# Patient Record
Sex: MALE | Race: WHITE | Employment: FULL TIME | URBAN - METROPOLITAN AREA
[De-identification: names, ages, dates, MRNs, and addresses within clinical notes are randomized per-mention and may not be internally consistent; named-entity substitution may affect disease eponyms.]

---

## 2018-01-15 ENCOUNTER — MYC MEDICAL ADVICE (OUTPATIENT)
Dept: FAMILY MEDICINE | Facility: CLINIC | Age: 58
End: 2018-01-15

## 2018-01-15 DIAGNOSIS — E78.5 HYPERLIPIDEMIA LDL GOAL <100: ICD-10-CM

## 2018-01-15 RX ORDER — SIMVASTATIN 40 MG
40 TABLET ORAL AT BEDTIME
Qty: 30 TABLET | Refills: 0 | Status: SHIPPED | OUTPATIENT
Start: 2018-01-15 | End: 2018-02-24

## 2018-01-18 ENCOUNTER — OFFICE VISIT (OUTPATIENT)
Dept: FAMILY MEDICINE | Facility: CLINIC | Age: 58
End: 2018-01-18
Payer: COMMERCIAL

## 2018-01-18 VITALS
SYSTOLIC BLOOD PRESSURE: 125 MMHG | BODY MASS INDEX: 28.85 KG/M2 | HEIGHT: 72 IN | TEMPERATURE: 97.4 F | RESPIRATION RATE: 16 BRPM | HEART RATE: 67 BPM | WEIGHT: 213 LBS | DIASTOLIC BLOOD PRESSURE: 85 MMHG

## 2018-01-18 DIAGNOSIS — Z11.59 NEED FOR HEPATITIS C SCREENING TEST: ICD-10-CM

## 2018-01-18 DIAGNOSIS — N52.9 ERECTILE DYSFUNCTION, UNSPECIFIED ERECTILE DYSFUNCTION TYPE: ICD-10-CM

## 2018-01-18 DIAGNOSIS — E78.5 HYPERLIPIDEMIA LDL GOAL <100: Primary | ICD-10-CM

## 2018-01-18 PROCEDURE — 36415 COLL VENOUS BLD VENIPUNCTURE: CPT | Performed by: FAMILY MEDICINE

## 2018-01-18 PROCEDURE — 86803 HEPATITIS C AB TEST: CPT | Performed by: FAMILY MEDICINE

## 2018-01-18 PROCEDURE — 80061 LIPID PANEL: CPT | Performed by: FAMILY MEDICINE

## 2018-01-18 PROCEDURE — 99213 OFFICE O/P EST LOW 20 MIN: CPT | Performed by: FAMILY MEDICINE

## 2018-01-18 PROCEDURE — 83721 ASSAY OF BLOOD LIPOPROTEIN: CPT | Performed by: FAMILY MEDICINE

## 2018-01-18 RX ORDER — SILDENAFIL 100 MG/1
TABLET, FILM COATED ORAL
Qty: 10 TABLET | Refills: 5 | Status: SHIPPED | OUTPATIENT
Start: 2018-01-18 | End: 2019-03-06

## 2018-01-18 RX ORDER — SIMVASTATIN 40 MG
40 TABLET ORAL AT BEDTIME
Qty: 30 TABLET | Refills: 0 | Status: CANCELLED | OUTPATIENT
Start: 2018-01-18

## 2018-01-18 NOTE — MR AVS SNAPSHOT
After Visit Summary   1/18/2018    Alek Gimenez    MRN: [id number]           Patient Information     Date Of Birth          1960        Visit Information        Provider Department      1/18/2018 4:00 PM Дмитрий Mendez MD [facility]        Today's Diagnoses     Need for hepatitis C screening test    -  1    Hyperlipidemia LDL goal <100        Erectile dysfunction, unspecified erectile dysfunction type          Care Instructions    Upon confirming cholesterol is in a good range we will continue simvastatin 40mg daily.          Follow-ups after your visit        Who to contact     If you have questions or need follow up information about today's clinic visit or your schedule please contact [facility] directly at [phone].  Normal or non-critical lab and imaging results will be communicated to you by MyChart, letter or phone within 4 business days after the clinic has received the results. If you do not hear from us within 7 days, please contact the clinic through Unypehart or phone. If you have a critical or abnormal lab result, we will notify you by phone as soon as possible.  Submit refill requests through Edaixi or call your pharmacy and they will forward the refill request to us. Please allow 3 business days for your refill to be completed.          Additional Information About Your Visit        MyChart Information     Edaixi gives you secure access to your electronic health record. If you see a primary care provider, you can also send messages to your care team and make appointments. If you have questions, please call your primary care clinic.  If you do not have a primary care provider, please call [phone] and they will assist you.        Care EveryWhere ID     This is your Care EveryWhere ID. This could be used by other organizations to access your [place] medical records  [id number]        Your Vitals Were     Pulse  "Temperature Respirations Height BMI (Body Mass Index)       67 97.4  F (36.3  C) (Oral) 16 5' 11.5\" (1.816 m) 29.29 kg/m2        Blood Pressure from Last 3 Encounters:   01/18/18 125/85   12/13/16 117/81   03/22/16 124/89    Weight from Last 3 Encounters:   01/18/18 213 lb (96.6 kg)   12/13/16 204 lb 12.8 oz (92.9 kg)   03/22/16 215 lb (97.5 kg)              We Performed the Following     Hepatitis C antibody     Lipid Profile with reflex to direct LDL          Where to get your medicines      These medications were sent to [id number] - [place], [place] - [address]  [address] [place] [address]     Phone:  [phone]     sildenafil 100 MG tablet          Primary Care Provider Office Phone # Fax #    Дмитрий Jay Mendez [phone] [address]  [facility] [address]        Equal Access to Services     JOSE WEBB AH: Hadii aad ku hadasho Soomaali, waaxda luqadaha, qaybta kaalmada adeshelbiyashobha, delilah askew . So [facility] [phone].    ATENCIÓN: Si habla español, tiene a guajardo disposición servicios gratuitos de asistencia lingüística. [facility] [phone].    We comply with applicable federal civil rights laws and [place] laws. We do not discriminate on the basis of race, color, national origin, age, disability, sex, sexual orientation, or gender identity.            Thank you!     Thank you for choosing [facility]  for your care. Our goal is always to provide you with excellent care. Hearing back from our patients is one way we can continue to improve our services. Please take a few minutes to complete the written survey that you may receive in the mail after your visit with us. Thank you!             Your Updated Medication List - Protect others around you: Learn how to safely use, store and throw away your medicines at www.disposemymeds.org.          This list is accurate as of: " 1/18/18  4:08 PM.  Always use your most recent med list.                   Brand Name Dispense Instructions for use Diagnosis    aspirin 81 MG tablet     30 tablet    Take 1 tablet (81 mg) by mouth daily        sildenafil 100 MG tablet    VIAGRA    10 tablet    0.5-1 tablets (50mg-100 mg) by mouth daily prn for erectile dysfunction prior to intercourse. Avoid nitroglycerin, terazosin or doxazosin.    Erectile dysfunction, unspecified erectile dysfunction type       simvastatin 40 MG tablet    ZOCOR    30 tablet    Take 1 tablet (40 mg) by mouth At Bedtime    Hyperlipidemia LDL goal <100

## 2018-01-18 NOTE — PROGRESS NOTES
"  SUBJECTIVE:                                                    Alek Gimenez is a 57 year old male who presents to clinic today for the following health issues:      History of Present Illness     Hyperlipidemia:     Low fat/chol diet rating::  Not monitoring fat    Taking Statins::  YES    Lipid Medications or Supplements::  None    Diet:  Regular (no restrictions)  Frequency of exercise:  None  Taking medications regularly:  Yes  Medication side effects:  None  Additional concerns today:  No    He did have a PFT test at work and it was normal. If it is really cold he may cough once or twice, but doing OK.    Physical Activity: Doesn't do any regular exercise, but does exercise when on vacation.  Nutrition: He has been preparing foods at home.    EXAM:  /85  Pulse 67  Temp 97.4  F (36.3  C) (Oral)  Resp 16  Ht 5' 11.5\" (1.816 m)  Wt 213 lb (96.6 kg)  BMI 29.29 kg/m2  Constitutional: Healthy, alert, no distress   EENT: PERRL, TM's clear bilaterally, oropharynx without erythema, no anterior cervical lymphadenopathy   Cardiovascular: RRR. No murmurs   Respiratory: Clear to auscultation   Skin: some dryness, no moles of concern    The 10-year ASCVD risk score (Geovanni DC Jr, et al., 2013) is: 9.8%    Values used to calculate the score:      Age: 57 years      Sex: Male      Is Non- : No      Diabetic: No      Tobacco smoker: No      Systolic Blood Pressure: 125 mmHg      Is BP treated: No      HDL Cholesterol: 44 mg/dL      Total Cholesterol: 273 mg/dL     ASSESSMENT    ICD-10-CM    1. Hyperlipidemia LDL goal <100 E78.5 Lipid Profile with reflex to direct LDL   2. Need for hepatitis C screening test Z11.59 Hepatitis C antibody   3. Erectile dysfunction, unspecified erectile dysfunction type N52.9 sildenafil (VIAGRA) 100 MG tablet      Plan:  Patient Instructions   Upon confirming cholesterol is in a good range we will continue simvastatin 40mg daily.     Дмитрий Mendez, " MD  Family Medicine Physician

## 2018-01-19 LAB
CHOLEST SERPL-MCNC: 210 MG/DL
HCV AB SERPL QL IA: NONREACTIVE
HDLC SERPL-MCNC: 39 MG/DL
LDLC SERPL CALC-MCNC: ABNORMAL MG/DL
LDLC SERPL DIRECT ASSAY-MCNC: 116 MG/DL
NONHDLC SERPL-MCNC: 171 MG/DL
TRIGL SERPL-MCNC: 544 MG/DL

## 2018-02-24 DIAGNOSIS — E78.5 HYPERLIPIDEMIA LDL GOAL <100: ICD-10-CM

## 2018-02-24 NOTE — LETTER
"March 6, 2018      Alek Gimenez  [address] [place] [address]        Dear Alek,     We tried to reach you a number of times by phone but there is no way to leave a message thus we are sending this letter. Can you read the providers notes below as he is wanting to discuss some things with you about the test results.  I enclose a copy.  Teresa Mendez writes:    \"Refilled simvastatin, but please inquire with patient his thoughts on triglycerides. One possiblity is that he was not fasting at the time of the most recent cholesterol test. We could retest in that case.     Alternatively could consider Niacin.     Here are thoughts I typically review with patients regarding niacin. This drug often has side effects that require the dose to be slowly built up to effective levels; many persons cannot continue the drug due to side effects, but it is very effective if the side effects are tolerable. Flushing, itching and feeling hot will likely occur after dosing, especially with higher doses. This can be reduced or reduced by taking an aspirin 30 minutes before the niacin. He may use either the immediate release 50 mg tabs and up-titrate, or use the timed-release larger doses, both available OTC. The effective dose is between 1000 and 2000 mg per day. We should repeat lipids along with LFT's in 3 months.\"      Дмитрий Mendez MD  Family Medicine Physician           Sincerely,        Дмитрий Mendez MD          "

## 2018-02-25 NOTE — TELEPHONE ENCOUNTER
"Requested Prescriptions   Pending Prescriptions Disp Refills     simvastatin (ZOCOR) 40 MG tablet [Pharmacy Med Name: SIMVASTATIN 40MG TABLETS]    Last Written Prescription Date:  1/15/2018  Last Fill Quantity: 30 tablets ,  # refills: 0   Last Office Visit: 1/18/2018   Future Office Visit:      30 tablet 0     Sig: TAKE 1 TABLET(40 MG) BY MOUTH AT BEDTIME    Statins Protocol Passed    2/24/2018  9:31 AM       Passed - LDL on file in past 12 months    Recent Labs   Lab Test  01/18/18   1522   LDL  Cannot estimate LDL when triglyceride exceeds 400 mg/dL  116*          Passed - No abnormal creatine kinase in past 12 months    No lab results found.         Passed - Recent or future visit with authorizing provider    Patient had office visit in the last year or has a visit in the next 30 days with authorizing provider.  See \"Patient Info\" tab in inbasket, or \"Choose Columns\" in Meds & Orders section of the refill encounter.          Passed - Patient is age 18 or older          "

## 2018-02-26 RX ORDER — SIMVASTATIN 40 MG
TABLET ORAL
Qty: 90 TABLET | Refills: 3 | Status: SHIPPED | OUTPATIENT
Start: 2018-02-26 | End: 2019-03-06

## 2018-02-26 NOTE — TELEPHONE ENCOUNTER
Refilled simvastatin, but please inquire with patient his thoughts on triglycerides. One possiblity is that he was not fasting at the time of the most recent cholesterol test. We could retest in that case.    Alternatively could consider Niacin.    Here are thoughts I typically review with patients regarding niacin. This drug often has side effects that require the dose to be slowly built up to effective levels; many persons cannot continue the drug due to side effects, but it is very effective if the side effects are tolerable. Flushing, itching and feeling hot will likely occur after dosing, especially with higher doses. This can be reduced or reduced by taking an aspirin 30 minutes before the niacin. He may use either the immediate release 50 mg tabs and up-titrate, or use the timed-release larger doses, both available OTC. The effective dose is between 1000 and 2000 mg per day. We should repeat lipids along with LFT's in 3 months.     Дмитрий Mendez MD  Family Medicine Physician

## 2018-02-26 NOTE — TELEPHONE ENCOUNTER
LDL direct of 116  Routing to provider.  Problem list is empty - please consider completing  Truptise Pollard, BRENDA

## 2018-03-06 ENCOUNTER — HOSPITAL ENCOUNTER (OUTPATIENT)
Dept: LAB | Facility: CLINIC | Age: 58
Discharge: HOME | End: 2018-03-06
Attending: FAMILY MEDICINE | Admitting: FAMILY MEDICINE
Payer: COMMERCIAL

## 2018-03-06 ENCOUNTER — TELEPHONE (OUTPATIENT)
Dept: FAMILY MEDICINE | Facility: CLINIC | Age: 58
End: 2018-03-06

## 2018-03-06 DIAGNOSIS — Z13.6 ENCOUNTER FOR LIPID SCREENING FOR CARDIOVASCULAR DISEASE: Primary | ICD-10-CM

## 2018-03-06 DIAGNOSIS — Z13.6 ENCOUNTER FOR LIPID SCREENING FOR CARDIOVASCULAR DISEASE: ICD-10-CM

## 2018-03-06 DIAGNOSIS — Z13.220 ENCOUNTER FOR LIPID SCREENING FOR CARDIOVASCULAR DISEASE: Primary | ICD-10-CM

## 2018-03-06 DIAGNOSIS — Z13.220 ENCOUNTER FOR LIPID SCREENING FOR CARDIOVASCULAR DISEASE: ICD-10-CM

## 2018-03-06 LAB
CHOLEST SERPL-MCNC: 231 MG/DL
HDLC SERPL-MCNC: 39 MG/DL
LDLC SERPL CALC-MCNC: 141 MG/DL
NONHDLC SERPL-MCNC: 192 MG/DL
TRIGL SERPL-MCNC: 256 MG/DL

## 2018-03-06 PROCEDURE — 80061 LIPID PANEL: CPT | Performed by: FAMILY MEDICINE

## 2018-03-06 PROCEDURE — 36415 COLL VENOUS BLD VENIPUNCTURE: CPT | Performed by: FAMILY MEDICINE

## 2018-03-06 NOTE — TELEPHONE ENCOUNTER
Letter sent to patient with his lab results hoping he gets back to us in response to recommendations and questions by provider.  Teresa Bhatti RN

## 2018-03-06 NOTE — TELEPHONE ENCOUNTER
"RD lab calling for fasting lab orders. Patient in lab currently. Last lab on 1.18.2018 stated per Дмитрий Mendez MD:    \"The cholesterol tests all were in a good range with the exception of the triglycerides which we not only elevated, but substantially elevated compared to the test a year ago. This is perplexing. Is it possible this relates to an incomplete fast? One approach would be to retest this level in a month or so. Please let me your thoughts. I can place a future order for fasting lipid if you'd like to retest this. I do recommend you continue the simvastatin 40mg nightly at this point. \"    Writer placed order for FLP as indicated in noted from provider.     Thanks! Shaylee Mathias RN    "

## 2019-03-06 ENCOUNTER — OFFICE VISIT (OUTPATIENT)
Dept: FAMILY MEDICINE | Facility: CLINIC | Age: 59
End: 2019-03-06
Payer: COMMERCIAL

## 2019-03-06 VITALS
DIASTOLIC BLOOD PRESSURE: 68 MMHG | RESPIRATION RATE: 14 BRPM | BODY MASS INDEX: 28.61 KG/M2 | OXYGEN SATURATION: 97 % | HEART RATE: 70 BPM | TEMPERATURE: 97.8 F | WEIGHT: 208 LBS | SYSTOLIC BLOOD PRESSURE: 110 MMHG

## 2019-03-06 DIAGNOSIS — E78.5 HYPERLIPIDEMIA LDL GOAL <100: ICD-10-CM

## 2019-03-06 DIAGNOSIS — N52.9 ERECTILE DYSFUNCTION, UNSPECIFIED ERECTILE DYSFUNCTION TYPE: Primary | ICD-10-CM

## 2019-03-06 DIAGNOSIS — Z23 NEED FOR SHINGLES VACCINE: ICD-10-CM

## 2019-03-06 LAB
ALBUMIN SERPL-MCNC: 4.1 G/DL (ref 3.4–5)
ALP SERPL-CCNC: 104 U/L (ref 40–150)
ALT SERPL W P-5'-P-CCNC: 41 U/L (ref 0–70)
ANION GAP SERPL CALCULATED.3IONS-SCNC: 6 MMOL/L (ref 3–14)
AST SERPL W P-5'-P-CCNC: 18 U/L (ref 0–45)
BILIRUB SERPL-MCNC: 0.5 MG/DL (ref 0.2–1.3)
BUN SERPL-MCNC: 20 MG/DL (ref 7–30)
CALCIUM SERPL-MCNC: 9.5 MG/DL (ref 8.5–10.1)
CHLORIDE SERPL-SCNC: 106 MMOL/L (ref 94–109)
CO2 SERPL-SCNC: 28 MMOL/L (ref 20–32)
CREAT SERPL-MCNC: 1.02 MG/DL (ref 0.66–1.25)
GFR SERPL CREATININE-BSD FRML MDRD: 80 ML/MIN/{1.73_M2}
GLUCOSE SERPL-MCNC: 94 MG/DL (ref 70–99)
POTASSIUM SERPL-SCNC: 4.5 MMOL/L (ref 3.4–5.3)
PROT SERPL-MCNC: 7.9 G/DL (ref 6.8–8.8)
SODIUM SERPL-SCNC: 140 MMOL/L (ref 133–144)

## 2019-03-06 PROCEDURE — 90750 HZV VACC RECOMBINANT IM: CPT | Performed by: FAMILY MEDICINE

## 2019-03-06 PROCEDURE — 36415 COLL VENOUS BLD VENIPUNCTURE: CPT | Performed by: FAMILY MEDICINE

## 2019-03-06 PROCEDURE — 90471 IMMUNIZATION ADMIN: CPT | Performed by: FAMILY MEDICINE

## 2019-03-06 PROCEDURE — 99213 OFFICE O/P EST LOW 20 MIN: CPT | Mod: 25 | Performed by: FAMILY MEDICINE

## 2019-03-06 PROCEDURE — 80053 COMPREHEN METABOLIC PANEL: CPT | Performed by: FAMILY MEDICINE

## 2019-03-06 RX ORDER — SILDENAFIL 100 MG/1
TABLET, FILM COATED ORAL
Qty: 10 TABLET | Refills: 5 | Status: SHIPPED | OUTPATIENT
Start: 2019-03-06 | End: 2020-06-08

## 2019-03-06 RX ORDER — SIMVASTATIN 40 MG
40 TABLET ORAL AT BEDTIME
Qty: 90 TABLET | Refills: 3 | Status: SHIPPED | OUTPATIENT
Start: 2019-03-06 | End: 2020-05-20

## 2019-03-06 NOTE — PATIENT INSTRUCTIONS
Return in 2-6 months for Shingles vaccine #2 (between 3/6/19 and 9/2/19)    Continue current dose of simvastatin    Continue sildenafil

## 2019-03-06 NOTE — PROGRESS NOTES
SUBJECTIVE:   Alek Gimenez is a 58 year old male who presents to clinic today for the following health issues:      Hyperlipidemia Follow-Up      Rate your low fat/cholesterol diet?: good    Taking statin?  Yes, no muscle aches from statin    Other lipid medications/supplements?:  none      Amount of exercise or physical activity: None    Problems taking medications regularly: No    Medication side effects: none    Diet: regular (no restrictions)    The 10-year ASCVD risk score (Geovanni MCPHERSON Jr., et al., 2013) is: 7.9%    Values used to calculate the score:      Age: 58 years      Sex: Male      Is Non- : No      Diabetic: No      Tobacco smoker: No      Systolic Blood Pressure: 110 mmHg      Is BP treated: No      HDL Cholesterol: 39 mg/dL      Total Cholesterol: 231 mg/dL    He has been on statin for 4 years.    EXAM:  /68   Pulse 70   Temp 97.8  F (36.6  C)   Resp 14   Wt 94.3 kg (208 lb)   SpO2 97%   BMI 28.61 kg/m    Constitutional: Healthy, alert, no distress   EENT: PERRL, TM's clear bilaterally, oropharynx without erythema, no anterior cervical lymphadenopathy   Cardiovascular: RRR. No murmurs   Respiratory: Clear to auscultation     ASSESSMENT    ICD-10-CM    1. Erectile dysfunction, unspecified erectile dysfunction type N52.9 sildenafil (VIAGRA) 100 MG tablet   2. Need for shingles vaccine Z23 HC ZOSTER VACCINE RECOMBINANT ADJUVANTED IM NJX   3. Hyperlipidemia LDL goal <100 E78.5 simvastatin (ZOCOR) 40 MG tablet     Comprehensive metabolic panel (BMP + Alb, Alk Phos, ALT, AST, Total. Bili, TP)      Plan:  Patient Instructions   Return in 2-6 months for Shingles vaccine #2 (between 3/6/19 and 9/2/19)    Continue current dose of simvastatin    Continue sildenafil       Return in about 1 year (around 3/6/2020) for Routine Visit.    Дмитрий Mendez MD  Family Medicine Physician

## 2019-03-06 NOTE — NURSING NOTE
Prior to injection verified patient identity using patient's name and date of birth.  Due to injection administration, patient instructed to remain in clinic for 15 minutes  afterwards, and to report any adverse reaction to me immediately. Vaccine information supplied.    Screening Questionnaire for Adult Immunization    Are you sick today?   No   Do you have allergies to medications, food, a vaccine component or latex?   No   Have you ever had a serious reaction after receiving a vaccination?   No   Do you have a long-term health problem with heart disease, lung disease, asthma, kidney disease, metabolic disease (e.g. diabetes), anemia, or other blood disorder?   No   Do you have cancer, leukemia, HIV/AIDS, or any other immune system problem?   No   In the past 3 months, have you taken medications that affect  your immune system, such as prednisone, other steroids, or anticancer drugs; drugs for the treatment of rheumatoid arthritis, Crohn s disease, or psoriasis; or have you had radiation treatments?   No   Have you had a seizure, or a brain or other nervous system problem?   No   During the past year, have you received a transfusion of blood or blood     products, or been given immune (gamma) globulin or antiviral drug?   No   For women: Are you pregnant or is there a chance you could become        pregnant during the next month?   No   Have you received any vaccinations in the past 4 weeks?   No     Immunization questionnaire answers were all negative.        Per orders of Dr. London Mendez, injection of Shigrix given by Pedro Gill. Patient instructed to remain in clinic for 15 minutes afterwards, and to report any adverse reaction to me immediately.       Screening performed by Pedro Gill on 3/6/2019 at 10:55 AM.

## 2019-10-04 ENCOUNTER — HEALTH MAINTENANCE LETTER (OUTPATIENT)
Age: 59
End: 2019-10-04

## 2019-12-16 ENCOUNTER — OFFICE VISIT (OUTPATIENT)
Dept: FAMILY MEDICINE | Facility: CLINIC | Age: 59
End: 2019-12-16
Payer: COMMERCIAL

## 2019-12-16 VITALS
SYSTOLIC BLOOD PRESSURE: 108 MMHG | TEMPERATURE: 98 F | DIASTOLIC BLOOD PRESSURE: 76 MMHG | WEIGHT: 215 LBS | HEART RATE: 80 BPM | OXYGEN SATURATION: 95 % | BODY MASS INDEX: 30.1 KG/M2 | HEIGHT: 71 IN

## 2019-12-16 DIAGNOSIS — E78.5 HYPERLIPIDEMIA, UNSPECIFIED HYPERLIPIDEMIA TYPE: Primary | ICD-10-CM

## 2019-12-16 DIAGNOSIS — Z12.5 SCREENING FOR PROSTATE CANCER: ICD-10-CM

## 2019-12-16 DIAGNOSIS — Z23 NEED FOR VACCINATION: ICD-10-CM

## 2019-12-16 PROCEDURE — 80061 LIPID PANEL: CPT | Performed by: INTERNAL MEDICINE

## 2019-12-16 PROCEDURE — 90750 HZV VACC RECOMBINANT IM: CPT | Performed by: INTERNAL MEDICINE

## 2019-12-16 PROCEDURE — 99203 OFFICE O/P NEW LOW 30 MIN: CPT | Mod: 25 | Performed by: INTERNAL MEDICINE

## 2019-12-16 PROCEDURE — 90471 IMMUNIZATION ADMIN: CPT | Performed by: INTERNAL MEDICINE

## 2019-12-16 PROCEDURE — 36415 COLL VENOUS BLD VENIPUNCTURE: CPT | Performed by: INTERNAL MEDICINE

## 2019-12-16 PROCEDURE — G0103 PSA SCREENING: HCPCS | Performed by: INTERNAL MEDICINE

## 2019-12-16 ASSESSMENT — ENCOUNTER SYMPTOMS
MYALGIAS: 0
FATIGUE: 0
BLOOD IN STOOL: 0
HEADACHES: 0
DIFFICULTY URINATING: 0
TROUBLE SWALLOWING: 0
CONSTIPATION: 0
ABDOMINAL PAIN: 0
DIARRHEA: 0
LIGHT-HEADEDNESS: 0
BACK PAIN: 0
CHILLS: 0
SHORTNESS OF BREATH: 0
SORE THROAT: 0
ARTHRALGIAS: 0
FEVER: 0
NERVOUS/ANXIOUS: 0
PALPITATIONS: 0
DIZZINESS: 0
EYE PAIN: 0
COUGH: 0
SLEEP DISTURBANCE: 0
NAUSEA: 0
VOMITING: 0
NUMBNESS: 0
NECK PAIN: 0

## 2019-12-16 ASSESSMENT — MIFFLIN-ST. JEOR: SCORE: 1812.36

## 2019-12-16 NOTE — PROGRESS NOTES
"Subjective     Alek Gimenez is a 59 year old male who presents to clinic today for the following health issues: recheck medication    HPI     Patient takes simvastatin 40 mg daily for hyperlipidemia and has been tolerating the medication well.      Past Medical History:   Diagnosis Date     Hyperlipidemia, unspecified hyperlipidemia type 12/16/2019       Review of Systems   Constitutional: Negative for chills, fatigue and fever.   HENT: Negative for congestion, ear pain, hearing loss, sore throat and trouble swallowing.    Eyes: Negative for pain and visual disturbance.   Respiratory: Negative for cough and shortness of breath.    Cardiovascular: Negative for chest pain and palpitations.   Gastrointestinal: Negative for abdominal pain, blood in stool, constipation, diarrhea, nausea and vomiting.   Genitourinary: Negative for difficulty urinating and testicular pain.   Musculoskeletal: Negative for arthralgias, back pain, myalgias and neck pain.   Skin: Negative for rash.   Neurological: Negative for dizziness, light-headedness, numbness and headaches.   Psychiatric/Behavioral: Negative for sleep disturbance. The patient is not nervous/anxious.        /76 (BP Location: Right arm, Cuff Size: Adult Large)   Pulse 80   Temp 98  F (36.7  C) (Oral)   Ht 1.803 m (5' 11\")   Wt 97.5 kg (215 lb)   SpO2 95%   BMI 29.99 kg/m        Physical Exam  Vitals signs and nursing note reviewed.   Constitutional:       General: He is not in acute distress.  Neck:      Thyroid: No thyromegaly.   Cardiovascular:      Rate and Rhythm: Normal rate and regular rhythm.      Heart sounds: Normal heart sounds.   Pulmonary:      Effort: Pulmonary effort is normal. No respiratory distress.      Breath sounds: Normal breath sounds.   Neurological:      Mental Status: He is alert and oriented to person, place, and time.      Coordination: Coordination normal.           ICD-10-CM    1. Hyperlipidemia, unspecified hyperlipidemia type " E78.5 Lipid panel reflex to direct LDL Fasting   2. Screening for prostate cancer Z12.5 Prostate spec antigen screen   3. Need for vaccination Z23 SHINGRIX [[id number]]       Patient Instructions   Maintain low fat/calorie diet and regular exercise.    Follow up yearly or as needed.

## 2019-12-16 NOTE — NURSING NOTE
Prior to immunization administration, verified patients identity using patient s name and date of birth. Please see Immunization Activity for additional information.     Screening Questionnaire for Adult Immunization    Are you sick today?   No   Do you have allergies to medications, food, a vaccine component or latex?   No   Have you ever had a serious reaction after receiving a vaccination?   No   Do you have a long-term health problem with heart, lung, kidney, or metabolic disease (e.g., diabetes), asthma, a blood disorder, no spleen, complement component deficiency, a cochlear implant, or a spinal fluid leak?  Are you on long-term aspirin therapy?   No   Do you have cancer, leukemia, HIV/AIDS, or any other immune system problem?   No   Do you have a parent, brother, or sister with an immune system problem?   No   In the past 3 months, have you taken medications that affect  your immune system, such as prednisone, other steroids, or anticancer drugs; drugs for the treatment of rheumatoid arthritis, Crohn s disease, or psoriasis; or have you had radiation treatments?   No   Have you had a seizure, or a brain or other nervous system problem?   No   During the past year, have you received a transfusion of blood or blood    products, or been given immune (gamma) globulin or antiviral drug?   No   For women: Are you pregnant or is there a chance you could become       pregnant during the next month?   No   Have you received any vaccinations in the past 4 weeks?   No     Immunization questionnaire answers were all negative.        Per orders of Dr. Agosto, injection of Shingrix #2 given by Stephanie Gary MA. Patient instructed to remain in clinic for 15 minutes afterwards, and to report any adverse reaction to me immediately.  Patient verified     Screening performed by Stephanie Gary MA on 12/16/2019 at 1:14 PM.

## 2019-12-17 LAB
CHOLEST SERPL-MCNC: 202 MG/DL
HDLC SERPL-MCNC: 40 MG/DL
LDLC SERPL CALC-MCNC: 118 MG/DL
NONHDLC SERPL-MCNC: 162 MG/DL
PSA SERPL-ACNC: 0.94 UG/L (ref 0–4)
TRIGL SERPL-MCNC: 219 MG/DL

## 2019-12-28 ENCOUNTER — HOSPITAL ENCOUNTER (INPATIENT)
Facility: CLINIC | Age: 59
LOS: 1 days | Discharge: HOME | DRG: 378 | End: 2019-12-30
Attending: EMERGENCY MEDICINE | Admitting: INTERNAL MEDICINE
Payer: COMMERCIAL

## 2019-12-28 DIAGNOSIS — K92.2 GASTROINTESTINAL HEMORRHAGE, UNSPECIFIED GASTROINTESTINAL HEMORRHAGE TYPE: ICD-10-CM

## 2019-12-28 DIAGNOSIS — K92.2 UPPER GI BLEEDING: Primary | ICD-10-CM

## 2019-12-28 DIAGNOSIS — K92.1 MELENA: ICD-10-CM

## 2019-12-28 DIAGNOSIS — D62 ANEMIA DUE TO BLOOD LOSS, ACUTE: ICD-10-CM

## 2019-12-28 LAB
ALBUMIN SERPL-MCNC: 3.2 G/DL (ref 3.4–5)
ALP SERPL-CCNC: 88 U/L (ref 40–150)
ALT SERPL W P-5'-P-CCNC: 30 U/L (ref 0–70)
ANION GAP SERPL CALCULATED.3IONS-SCNC: 7 MMOL/L (ref 3–14)
AST SERPL W P-5'-P-CCNC: 14 U/L (ref 0–45)
BASOPHILS # BLD AUTO: 0 10E9/L (ref 0–0.2)
BASOPHILS NFR BLD AUTO: 0.2 %
BILIRUB SERPL-MCNC: 0.4 MG/DL (ref 0.2–1.3)
BUN SERPL-MCNC: 50 MG/DL (ref 7–30)
CALCIUM SERPL-MCNC: 8.2 MG/DL (ref 8.5–10.1)
CHLORIDE SERPL-SCNC: 107 MMOL/L (ref 94–109)
CO2 SERPL-SCNC: 25 MMOL/L (ref 20–32)
CREAT SERPL-MCNC: 0.88 MG/DL (ref 0.66–1.25)
DIFFERENTIAL METHOD BLD: ABNORMAL
EOSINOPHIL # BLD AUTO: 0.4 10E9/L (ref 0–0.7)
EOSINOPHIL NFR BLD AUTO: 3.1 %
ERYTHROCYTE [DISTWIDTH] IN BLOOD BY AUTOMATED COUNT: 12.6 % (ref 10–15)
GFR SERPL CREATININE-BSD FRML MDRD: >90 ML/MIN/{1.73_M2}
GLUCOSE SERPL-MCNC: 172 MG/DL (ref 70–99)
HCT VFR BLD AUTO: 33.7 % (ref 40–53)
HGB BLD-MCNC: 11.5 G/DL (ref 13.3–17.7)
IMM GRANULOCYTES # BLD: 0.1 10E9/L (ref 0–0.4)
IMM GRANULOCYTES NFR BLD: 0.6 %
LYMPHOCYTES # BLD AUTO: 3.5 10E9/L (ref 0.8–5.3)
LYMPHOCYTES NFR BLD AUTO: 27 %
MCH RBC QN AUTO: 30.3 PG (ref 26.5–33)
MCHC RBC AUTO-ENTMCNC: 34.1 G/DL (ref 31.5–36.5)
MCV RBC AUTO: 89 FL (ref 78–100)
MONOCYTES # BLD AUTO: 0.7 10E9/L (ref 0–1.3)
MONOCYTES NFR BLD AUTO: 5.2 %
NEUTROPHILS # BLD AUTO: 8.3 10E9/L (ref 1.6–8.3)
NEUTROPHILS NFR BLD AUTO: 63.9 %
NRBC # BLD AUTO: 0 10*3/UL
NRBC BLD AUTO-RTO: 0 /100
PLATELET # BLD AUTO: 244 10E9/L (ref 150–450)
POTASSIUM SERPL-SCNC: 3.8 MMOL/L (ref 3.4–5.3)
PROT SERPL-MCNC: 6.3 G/DL (ref 6.8–8.8)
RBC # BLD AUTO: 3.8 10E12/L (ref 4.4–5.9)
SODIUM SERPL-SCNC: 139 MMOL/L (ref 133–144)
WBC # BLD AUTO: 13 10E9/L (ref 4–11)

## 2019-12-28 PROCEDURE — 25800030 ZZH RX IP 258 OP 636: Performed by: EMERGENCY MEDICINE

## 2019-12-28 PROCEDURE — 96376 TX/PRO/DX INJ SAME DRUG ADON: CPT

## 2019-12-28 PROCEDURE — 93005 ELECTROCARDIOGRAM TRACING: CPT

## 2019-12-28 PROCEDURE — C9113 INJ PANTOPRAZOLE SODIUM, VIA: HCPCS | Performed by: EMERGENCY MEDICINE

## 2019-12-28 PROCEDURE — 86900 BLOOD TYPING SEROLOGIC ABO: CPT | Performed by: EMERGENCY MEDICINE

## 2019-12-28 PROCEDURE — 85025 COMPLETE CBC W/AUTO DIFF WBC: CPT | Performed by: EMERGENCY MEDICINE

## 2019-12-28 PROCEDURE — 99285 EMERGENCY DEPT VISIT HI MDM: CPT | Mod: 25

## 2019-12-28 PROCEDURE — 86901 BLOOD TYPING SEROLOGIC RH(D): CPT | Performed by: EMERGENCY MEDICINE

## 2019-12-28 PROCEDURE — 25000128 H RX IP 250 OP 636: Performed by: EMERGENCY MEDICINE

## 2019-12-28 PROCEDURE — 86850 RBC ANTIBODY SCREEN: CPT | Performed by: EMERGENCY MEDICINE

## 2019-12-28 PROCEDURE — 80053 COMPREHEN METABOLIC PANEL: CPT | Performed by: EMERGENCY MEDICINE

## 2019-12-28 PROCEDURE — 96361 HYDRATE IV INFUSION ADD-ON: CPT

## 2019-12-28 RX ORDER — SODIUM CHLORIDE 9 MG/ML
1000 INJECTION, SOLUTION INTRAVENOUS CONTINUOUS
Status: DISCONTINUED | OUTPATIENT
Start: 2019-12-29 | End: 2019-12-29

## 2019-12-28 RX ADMIN — SODIUM CHLORIDE 1000 ML: 9 INJECTION, SOLUTION INTRAVENOUS at 23:34

## 2019-12-28 RX ADMIN — PANTOPRAZOLE SODIUM 40 MG: 40 INJECTION, POWDER, FOR SOLUTION INTRAVENOUS at 23:35

## 2019-12-28 ASSESSMENT — MIFFLIN-ST. JEOR: SCORE: 1805.55

## 2019-12-29 PROBLEM — K92.2 UPPER GI BLEEDING: Status: ACTIVE | Noted: 2019-12-29

## 2019-12-29 LAB
ABO + RH BLD: NORMAL
ABO + RH BLD: NORMAL
BLD GP AB SCN SERPL QL: NORMAL
BLOOD BANK CMNT PATIENT-IMP: NORMAL
HBA1C MFR BLD: 5.3 % (ref 0–5.6)
HGB BLD-MCNC: 9.4 G/DL (ref 13.3–17.7)
HGB BLD-MCNC: 9.5 G/DL (ref 13.3–17.7)
INTERPRETATION ECG - MUSE: NORMAL
LACTATE BLD-SCNC: 1.3 MMOL/L (ref 0.7–2)
SPECIMEN EXP DATE BLD: NORMAL
UPPER GI ENDOSCOPY: NORMAL

## 2019-12-29 PROCEDURE — 83605 ASSAY OF LACTIC ACID: CPT | Performed by: INTERNAL MEDICINE

## 2019-12-29 PROCEDURE — 83036 HEMOGLOBIN GLYCOSYLATED A1C: CPT | Performed by: INTERNAL MEDICINE

## 2019-12-29 PROCEDURE — 0DB98ZX EXCISION OF DUODENUM, VIA NATURAL OR ARTIFICIAL OPENING ENDOSCOPIC, DIAGNOSTIC: ICD-10-PCS | Performed by: INTERNAL MEDICINE

## 2019-12-29 PROCEDURE — C9113 INJ PANTOPRAZOLE SODIUM, VIA: HCPCS | Performed by: EMERGENCY MEDICINE

## 2019-12-29 PROCEDURE — 88305 TISSUE EXAM BY PATHOLOGIST: CPT | Mod: 26,76 | Performed by: INTERNAL MEDICINE

## 2019-12-29 PROCEDURE — 88305 TISSUE EXAM BY PATHOLOGIST: CPT | Performed by: INTERNAL MEDICINE

## 2019-12-29 PROCEDURE — 21000001 ZZH R&B HEART CARE

## 2019-12-29 PROCEDURE — 25000128 H RX IP 250 OP 636: Performed by: INTERNAL MEDICINE

## 2019-12-29 PROCEDURE — 25800030 ZZH RX IP 258 OP 636: Performed by: INTERNAL MEDICINE

## 2019-12-29 PROCEDURE — 43239 EGD BIOPSY SINGLE/MULTIPLE: CPT | Performed by: INTERNAL MEDICINE

## 2019-12-29 PROCEDURE — 43255 EGD CONTROL BLEEDING ANY: CPT | Performed by: INTERNAL MEDICINE

## 2019-12-29 PROCEDURE — 96365 THER/PROPH/DIAG IV INF INIT: CPT

## 2019-12-29 PROCEDURE — 88312 SPECIAL STAINS GROUP 1: CPT | Performed by: INTERNAL MEDICINE

## 2019-12-29 PROCEDURE — 25000125 ZZHC RX 250: Performed by: INTERNAL MEDICINE

## 2019-12-29 PROCEDURE — 85018 HEMOGLOBIN: CPT | Performed by: INTERNAL MEDICINE

## 2019-12-29 PROCEDURE — 25800030 ZZH RX IP 258 OP 636: Performed by: EMERGENCY MEDICINE

## 2019-12-29 PROCEDURE — 96366 THER/PROPH/DIAG IV INF ADDON: CPT

## 2019-12-29 PROCEDURE — 36415 COLL VENOUS BLD VENIPUNCTURE: CPT | Performed by: INTERNAL MEDICINE

## 2019-12-29 PROCEDURE — 99222 1ST HOSP IP/OBS MODERATE 55: CPT | Mod: AI | Performed by: INTERNAL MEDICINE

## 2019-12-29 PROCEDURE — C9113 INJ PANTOPRAZOLE SODIUM, VIA: HCPCS | Performed by: INTERNAL MEDICINE

## 2019-12-29 PROCEDURE — 99207 ZZC APP CREDIT; MD BILLING SHARED VISIT: CPT | Performed by: INTERNAL MEDICINE

## 2019-12-29 PROCEDURE — 43236 UPPR GI SCOPE W/SUBMUC INJ: CPT | Performed by: INTERNAL MEDICINE

## 2019-12-29 PROCEDURE — 3E0G8GC INTRODUCTION OF OTHER THERAPEUTIC SUBSTANCE INTO UPPER GI, VIA NATURAL OR ARTIFICIAL OPENING ENDOSCOPIC: ICD-10-PCS | Performed by: INTERNAL MEDICINE

## 2019-12-29 PROCEDURE — 88312 SPECIAL STAINS GROUP 1: CPT | Mod: 26 | Performed by: INTERNAL MEDICINE

## 2019-12-29 PROCEDURE — 25000128 H RX IP 250 OP 636: Performed by: EMERGENCY MEDICINE

## 2019-12-29 PROCEDURE — G0500 MOD SEDAT ENDO SERVICE >5YRS: HCPCS | Performed by: INTERNAL MEDICINE

## 2019-12-29 PROCEDURE — 0DB68ZX EXCISION OF STOMACH, VIA NATURAL OR ARTIFICIAL OPENING ENDOSCOPIC, DIAGNOSTIC: ICD-10-PCS | Performed by: INTERNAL MEDICINE

## 2019-12-29 RX ORDER — NALOXONE HYDROCHLORIDE 0.4 MG/ML
.1-.4 INJECTION, SOLUTION INTRAMUSCULAR; INTRAVENOUS; SUBCUTANEOUS
Status: CANCELLED | OUTPATIENT
Start: 2019-12-29 | End: 2019-12-30

## 2019-12-29 RX ORDER — ONDANSETRON 4 MG/1
4 TABLET, ORALLY DISINTEGRATING ORAL EVERY 6 HOURS PRN
Status: DISCONTINUED | OUTPATIENT
Start: 2019-12-29 | End: 2019-12-30 | Stop reason: HOSPADM

## 2019-12-29 RX ORDER — FLUMAZENIL 0.1 MG/ML
0.2 INJECTION, SOLUTION INTRAVENOUS
Status: CANCELLED | OUTPATIENT
Start: 2019-12-29 | End: 2019-12-29

## 2019-12-29 RX ORDER — NALOXONE HYDROCHLORIDE 0.4 MG/ML
.1-.4 INJECTION, SOLUTION INTRAMUSCULAR; INTRAVENOUS; SUBCUTANEOUS
Status: DISCONTINUED | OUTPATIENT
Start: 2019-12-29 | End: 2019-12-30 | Stop reason: HOSPADM

## 2019-12-29 RX ORDER — PROCHLORPERAZINE 25 MG
25 SUPPOSITORY, RECTAL RECTAL EVERY 12 HOURS PRN
Status: DISCONTINUED | OUTPATIENT
Start: 2019-12-29 | End: 2019-12-30 | Stop reason: HOSPADM

## 2019-12-29 RX ORDER — ACETAMINOPHEN 650 MG/1
650 SUPPOSITORY RECTAL EVERY 4 HOURS PRN
Status: DISCONTINUED | OUTPATIENT
Start: 2019-12-29 | End: 2019-12-30 | Stop reason: HOSPADM

## 2019-12-29 RX ORDER — ACETAMINOPHEN 325 MG/1
650 TABLET ORAL EVERY 4 HOURS PRN
Status: DISCONTINUED | OUTPATIENT
Start: 2019-12-29 | End: 2019-12-30 | Stop reason: HOSPADM

## 2019-12-29 RX ORDER — AMOXICILLIN 250 MG
1 CAPSULE ORAL 2 TIMES DAILY PRN
Status: DISCONTINUED | OUTPATIENT
Start: 2019-12-29 | End: 2019-12-30 | Stop reason: HOSPADM

## 2019-12-29 RX ORDER — POLYETHYLENE GLYCOL 3350 17 G/17G
17 POWDER, FOR SOLUTION ORAL DAILY PRN
Status: DISCONTINUED | OUTPATIENT
Start: 2019-12-29 | End: 2019-12-30 | Stop reason: HOSPADM

## 2019-12-29 RX ORDER — POTASSIUM CHLORIDE 1500 MG/1
20-40 TABLET, EXTENDED RELEASE ORAL
Status: DISCONTINUED | OUTPATIENT
Start: 2019-12-29 | End: 2019-12-30 | Stop reason: HOSPADM

## 2019-12-29 RX ORDER — FENTANYL CITRATE 50 UG/ML
INJECTION, SOLUTION INTRAMUSCULAR; INTRAVENOUS PRN
Status: DISCONTINUED | OUTPATIENT
Start: 2019-12-29 | End: 2019-12-29 | Stop reason: HOSPADM

## 2019-12-29 RX ORDER — POTASSIUM CHLORIDE 29.8 MG/ML
20 INJECTION INTRAVENOUS
Status: DISCONTINUED | OUTPATIENT
Start: 2019-12-29 | End: 2019-12-30 | Stop reason: HOSPADM

## 2019-12-29 RX ORDER — ONDANSETRON 2 MG/ML
4 INJECTION INTRAMUSCULAR; INTRAVENOUS EVERY 6 HOURS PRN
Status: DISCONTINUED | OUTPATIENT
Start: 2019-12-29 | End: 2019-12-30 | Stop reason: HOSPADM

## 2019-12-29 RX ORDER — POTASSIUM CL/LIDO/0.9 % NACL 10MEQ/0.1L
10 INTRAVENOUS SOLUTION, PIGGYBACK (ML) INTRAVENOUS
Status: DISCONTINUED | OUTPATIENT
Start: 2019-12-29 | End: 2019-12-30 | Stop reason: HOSPADM

## 2019-12-29 RX ORDER — MAGNESIUM SULFATE HEPTAHYDRATE 40 MG/ML
4 INJECTION, SOLUTION INTRAVENOUS EVERY 4 HOURS PRN
Status: DISCONTINUED | OUTPATIENT
Start: 2019-12-29 | End: 2019-12-30 | Stop reason: HOSPADM

## 2019-12-29 RX ORDER — BISACODYL 10 MG
10 SUPPOSITORY, RECTAL RECTAL DAILY PRN
Status: DISCONTINUED | OUTPATIENT
Start: 2019-12-29 | End: 2019-12-30 | Stop reason: HOSPADM

## 2019-12-29 RX ORDER — LIDOCAINE 40 MG/G
CREAM TOPICAL
Status: DISCONTINUED | OUTPATIENT
Start: 2019-12-29 | End: 2019-12-30 | Stop reason: HOSPADM

## 2019-12-29 RX ORDER — LANOLIN ALCOHOL/MO/W.PET/CERES
3 CREAM (GRAM) TOPICAL
Status: DISCONTINUED | OUTPATIENT
Start: 2019-12-29 | End: 2019-12-30 | Stop reason: HOSPADM

## 2019-12-29 RX ORDER — AMOXICILLIN 250 MG
2 CAPSULE ORAL 2 TIMES DAILY PRN
Status: DISCONTINUED | OUTPATIENT
Start: 2019-12-29 | End: 2019-12-30 | Stop reason: HOSPADM

## 2019-12-29 RX ORDER — OXYCODONE HYDROCHLORIDE 5 MG/1
5-10 TABLET ORAL
Status: DISCONTINUED | OUTPATIENT
Start: 2019-12-29 | End: 2019-12-30 | Stop reason: HOSPADM

## 2019-12-29 RX ORDER — PROCHLORPERAZINE MALEATE 5 MG
10 TABLET ORAL EVERY 6 HOURS PRN
Status: DISCONTINUED | OUTPATIENT
Start: 2019-12-29 | End: 2019-12-30 | Stop reason: HOSPADM

## 2019-12-29 RX ORDER — POTASSIUM CHLORIDE 1.5 G/1.58G
20-40 POWDER, FOR SOLUTION ORAL
Status: DISCONTINUED | OUTPATIENT
Start: 2019-12-29 | End: 2019-12-30 | Stop reason: HOSPADM

## 2019-12-29 RX ORDER — POTASSIUM CHLORIDE 7.45 MG/ML
10 INJECTION INTRAVENOUS
Status: DISCONTINUED | OUTPATIENT
Start: 2019-12-29 | End: 2019-12-30 | Stop reason: HOSPADM

## 2019-12-29 RX ORDER — SODIUM CHLORIDE, SODIUM LACTATE, POTASSIUM CHLORIDE, CALCIUM CHLORIDE 600; 310; 30; 20 MG/100ML; MG/100ML; MG/100ML; MG/100ML
INJECTION, SOLUTION INTRAVENOUS CONTINUOUS
Status: DISCONTINUED | OUTPATIENT
Start: 2019-12-29 | End: 2019-12-29

## 2019-12-29 RX ADMIN — SODIUM CHLORIDE 1000 ML: 9 INJECTION, SOLUTION INTRAVENOUS at 02:14

## 2019-12-29 RX ADMIN — PANTOPRAZOLE SODIUM 40 MG: 40 INJECTION, POWDER, FOR SOLUTION INTRAVENOUS at 20:36

## 2019-12-29 RX ADMIN — SODIUM CHLORIDE, POTASSIUM CHLORIDE, SODIUM LACTATE AND CALCIUM CHLORIDE 500 ML: 600; 310; 30; 20 INJECTION, SOLUTION INTRAVENOUS at 05:30

## 2019-12-29 RX ADMIN — SODIUM CHLORIDE 8 MG/HR: 9 INJECTION, SOLUTION INTRAVENOUS at 00:07

## 2019-12-29 RX ADMIN — SODIUM CHLORIDE, POTASSIUM CHLORIDE, SODIUM LACTATE AND CALCIUM CHLORIDE: 600; 310; 30; 20 INJECTION, SOLUTION INTRAVENOUS at 07:35

## 2019-12-29 RX ADMIN — SODIUM CHLORIDE, POTASSIUM CHLORIDE, SODIUM LACTATE AND CALCIUM CHLORIDE: 600; 310; 30; 20 INJECTION, SOLUTION INTRAVENOUS at 04:19

## 2019-12-29 ASSESSMENT — ENCOUNTER SYMPTOMS
BLOOD IN STOOL: 1
APPETITE CHANGE: 1
DIZZINESS: 1
VOMITING: 0
SHORTNESS OF BREATH: 1
LIGHT-HEADEDNESS: 1
CHEST TIGHTNESS: 0
ABDOMINAL PAIN: 0

## 2019-12-29 ASSESSMENT — ACTIVITIES OF DAILY LIVING (ADL)
ADLS_ACUITY_SCORE: 11

## 2019-12-29 NOTE — PLAN OF CARE
Pt admitted as overflow from ED with melena. VSS since admission to floor, tolerating RA. No stools overnight. Tele SR. NPO for GI consult and transfer off floor? Continue to monitor

## 2019-12-29 NOTE — ED TRIAGE NOTES
Patient her with dark stool 2 days. Now tonight notice increase SOB with dark tarry stool.  Denies abdominal pain. He c/o dizziness getting up

## 2019-12-29 NOTE — PROCEDURES
INPATIENT PRE-PROCEDURE NOTE    CHIEF COMPLAINT / REASON FOR PROCEDURE:  Gi bleed    History and Physical Reviewed: on chart-<30 days old; updated within 7 days.    PRE-SEDATION ASSESSMENT:   Lung Exam: normal  Heart Exam: normal  Mallampati Airway Classification: Class I (visualization of the soft palate, fauces, uvula, anterior and posterior pillars)  Previous reaction to anesthesia/sedation: No  Sedation plan based on assessment:Moderate (conscious) sedation    Comment(s):      ASA Classification: 1 - Healthy patient, no medical problems      IMPRESSION:  ugi bleed  Plan :  jose enrique Peña MD  [facility]  Office: [phone]  Pager:  [phone] 8- 5pm on Monday-Thursday, 8-4pm Friday

## 2019-12-29 NOTE — PROGRESS NOTES
RECEIVING UNIT ED HANDOFF REVIEW    ED Nurse Handoff Report was reviewed by: Amanda Seaver, RN on December 29, 2019 at 1:25 AM

## 2019-12-29 NOTE — ED PROVIDER NOTES
History     Chief Complaint:  Rectal Bleeding    The history is provided by the patient.      Alek Gimenez is a 59 year old male who presents with rectal bleeding/dark, normal formed, stools for the last two days. He notes he is also experiencing shortness of breath that worsens with exertion and pre-syncopal episodes and feeling lightheaded when he stands up. He states that he ate dinner tonight because he felt like he had to, but not because he was hungry. His persisting melena in his stool, shortness of breath, and lightheadedness prompted the patient to call EMS who then transferred him here for further evaluation.    Patient denies any abdominal pain, vomiting, liver pain, chest pain, heaviness, pressure, or tightness. Patient denies a personal/family history of heartburn, ulcers, other stomach or bowel conditions, or bleeding or clotting disorders. Patient has an appointment with his PCP about two weeks ago and notes that everything was normal, but does not believe his hemoglobin level was checked. He states his last colonoscopy was a year ago, that came back negative, and denies ever getting an endoscopy before.    Allergies:  No Known Drug Allergies     Medications:    Aspirin  Viagra  Zocor    Past Medical History:    Hyperlipidemia     Past Surgical History:    The patient does not have any pertinent past surgical history.    Family History:    No past pertinent family history.    Social History:  Negative for tobacco use.  Positive for alcohol use.  Negative for drug use.  Presents via EMS.  Occupation: ICU doctor at [employer]  Marital Status:        Review of Systems   Constitutional: Positive for appetite change.   Respiratory: Positive for shortness of breath. Negative for chest tightness.    Cardiovascular: Negative for chest pain.   Gastrointestinal: Positive for blood in stool. Negative for abdominal pain and vomiting.   Neurological: Positive for dizziness and light-headedness.   All  other systems reviewed and are negative.        Physical Exam     Patient Vitals for the past 24 hrs:   BP Temp Temp src Pulse Heart Rate Resp SpO2 Height Weight   12/29/19 0030 106/68 -- -- 94 -- -- 95 % -- --   12/29/19 0020 121/72 -- -- -- -- -- 97 % -- --   12/28/19 2345 122/79 -- -- 124 -- -- 99 % -- --   12/28/19 2330 131/86 -- -- 111 -- -- 94 % -- --   12/28/19 2312 134/77 98.5  F (36.9  C) Oral 105 105 16 96 % 1.829 m (6') 95.3 kg (210 lb)     Physical Exam  Constitutional:  Appears well-developed and well-nourished. Cooperative.   HENT:   Head:    Atraumatic.   Mouth/Throat:   Oropharynx is without erythema or exudate and mucous     membranes are moist. Tacky oral mucosa.  Eyes:    Conjunctivae normal and EOM are normal.      Pupils are equal, round, and reactive to light.   Neck:    Normal range of motion. Neck supple.   Cardiovascular:  Tachycardic, regular rhythm, normal heart sounds and radial and    dorsalis pedis pulses are 2+ and symmetric.    Pulmonary/Chest:  Effort normal and breath sounds normal.   Abdominal:   Soft. Bowel sounds are normal.      No splenomegaly or hepatomegaly. No tenderness. No rebound.   Musculoskeletal:  Normal range of motion. No edema and no tenderness.   Neurological:  Alert. Normal strength. No cranial nerve deficit.   Skin:    Skin is warm and dry.   Psychiatric:   Normal mood and affect.     Emergency Department Course   ECG:  Indication: Dark Stool  Time: 2329  Vent. Rate 107 bpm. DE interval 170. QRS duration 92. QT/QTc 352/469. P-R-T axis 65 42 28. Sinus tachycardia. Otherwise normal ECG. Read time: 2330    Laboratory:  CBC: WBC: 13.0 (H), HGB: 11.5 (L), PLT: 244  BMP: Glucose 172 (H), Urea Nitrogen: 50 (H), Calcium: 8.2 (L), Albumin: 3.2 (L), Protein Total: 6.3 (L), o/w WNL (Creatinine: 0.88)    ABO/Rh Type and Screen: O Negative    Interventions:  2334 NS 1L IV  2335 Protonix 40 mg IV  2335 Protonix 40 mg IV  0007 Protonix 8 mg/hour IV    Emergency Department  Course:  Nursing notes and vitals reviewed. 2330 I performed an exam of the patient as documented above.     IV inserted. Medicine administered as documented above. Blood drawn. This was sent to the lab for further testing, results above.     0042  I consulted with Dr. Santana of the hospitalist services. They are in agreement to accept the patient for admission.    0045 I rechecked the patient and discussed the results of his workup thus far.     Findings and plan explained to the Patient who consents to admission. Discussed the patient with Dr. Santana, who will admit the patient to a CICU bed for further monitoring, evaluation, and treatment.    Impression & Plan    Medical Decision Making:  Alek Gimenez is a 59 year old male who presents with melena and blood in stool.  I considered a broad differential diagnosis for this patient including upper GIB (ulcer, gastritis, avm, tumor, etc) vs lower GIB (tumor, diverticulosis, avm, meckels, etc), colitis, aortoenteric fistula, hemorrhoids, fissures,  Ischemic colitis, bacterial stool infection (salmonella, shigella, e. Coli, campylobacter).      The workup in the ED is consistent with gastrointestinal bleeding, likely upper.  He however has no epigastric pain, no history of ulcers or heavy NSAID use.  I did give Protonix, and started a drip.     Given amount of blood, orthostatic symptoms, elevated BUN, anemia and exam, I would admit at this point for serial hemoglobins.  Patient was typed and screened.  Discussed with hospitalist.  They are stable and do not need ICU care at this point.        Diagnosis:    ICD-10-CM    1. Gastrointestinal hemorrhage, unspecified gastrointestinal hemorrhage type K92.2    2. Anemia due to blood loss, acute D62    3. Melena K92.1        Disposition:  Admitted to Dr. Santana    Scribe Disclosure:  PEPE, Ketty Hurley, am serving as a scribe on 12/28/2019 at 11:25 PM to personally document services performed by Mendez Jimenez MD based on my  observations and the provider's statements to me.     Ketty Hurley  12/28/2019    EMERGENCY DEPARTMENT       Mendez Jimenez MD  12/29/19 2691

## 2019-12-29 NOTE — PLAN OF CARE
Had UGI this am. Has tolerated liquids with no issues. Advanced to regular diet. Cont to monitor. Tolerating fluids well. Up ad nuria in room and halls.

## 2019-12-29 NOTE — ED NOTES
"[facility]  ED Nurse Handoff Report    ED Chief complaint: Rectal Bleeding      ED Diagnosis:   Final diagnoses:   None       Code Status: Full Code    Allergies: No Known Allergies    Activity level - Baseline/Home:  Independent  Activity Level - Current:   Independent    Patient's Preferred language: English   Needed?: No    Isolation: No  Infection: Not Applicable  Bariatric?: No    Vital Signs:   Vitals:    12/28/19 2312 12/28/19 2330 12/28/19 2345   BP: 134/77 131/86 122/79   Pulse: 105 111 124   Resp: 16     Temp: 98.5  F (36.9  C)     TempSrc: Oral     SpO2: 96% 94% 99%   Weight: 95.3 kg (210 lb)     Height: 1.829 m (6')         Cardiac Rhythm: ,        Pain level:      Is this patient confused?: No   Does this patient have a guardian?  No         If yes, is there guardianship documents in the Epic \"Code/ACP\" activity?  No         Guardian Notified?  N/A  [place] - Suicide Severity Rating Scale Completed?  Yes  If yes, what color did the patient score?  White    Patient Report: Initial Complaint: patient here with black tarry stool started  tonight,dneis abdominal pain but c/o shortness of breath and dizziness. Patient reported 2 days ago had he also notice dark sttol but went away  Focused Assessment: Gi bleed  Tests Performed: labs  Abnormal Results:  Hgb 11.5  Treatments provided: Protonix    Family Comments: none    OBS brochure/video discussed/provided to patient/family: N/A              Name of person given brochure if not patient: .              Relationship to patient: .    ED Medications:   Medications   0.9% sodium chloride BOLUS (0 mLs Intravenous Stopped 12/29/19 0007)     Followed by   sodium chloride 0.9% infusion (has no administration in time range)   pantoprazole (PROTONIX) 80 mg in sodium chloride 0.9 % 100 mL infusion (8 mg/hr Intravenous New Bag 12/29/19 0007)   0.9% sodium chloride BOLUS (has no administration in time range)   pantoprazole (PROTONIX) 40 mg IV " push injection (40 mg Intravenous Given 12/28/19 2335)   pantoprazole (PROTONIX) 40 mg IV push injection (40 mg Intravenous Given 12/28/19 2335)       Drips infusing?:  Yes, protonix  For the majority of the shift this patient was Green.   Interventions performed were see above.    Severe Sepsis OR Septic Shock Diagnosis Present: No    To be done/followed up on inpatient unit:  .    ED NURSE PHONE NUMBER: [phone]

## 2019-12-29 NOTE — PHARMACY-ADMISSION MEDICATION HISTORY
Pharmacy Medication History  Admission medication history interview status for the 12/28/2019  admission is complete. See EPIC admission navigator for prior to admission medications     Medication history sources: Patient  Medication history source reliability: Good  Adherence assessment: Good    Significant changes made to the medication list:  None      Additional medication history information:   None    Medication reconciliation completed by provider prior to medication history? No    Time spent in this activity: 10 min      Prior to Admission medications    Medication Sig Last Dose Taking? Auth Provider   aspirin 81 MG tablet Take 1 tablet (81 mg) by mouth daily 12/27/2019 at AM  Дмитрий Mendez MD   sildenafil (VIAGRA) 100 MG tablet 0.5-1 tablets (50mg-100 mg) by mouth daily prn for erectile dysfunction prior to intercourse. Avoid nitroglycerin, terazosin or doxazosin. prn  Дмитрий Mendez MD   simvastatin (ZOCOR) 40 MG tablet Take 1 tablet (40 mg) by mouth At Bedtime 12/27/2019 at AM  Дмитрий Mendez MD

## 2019-12-29 NOTE — PROGRESS NOTES
EGD:  See provation note  Duodenal ulcer with pigmented material  Injected with epi and BICAP cautery.  Good result.  Chadwick Peña MD  [phone]  After 5 pm or on weekends  [phone]

## 2019-12-29 NOTE — PROGRESS NOTES
Pt seen and examined. Seen by my colleague Dr Santana earlier in the morning. H/P, labs, vital signs and orders reviewed. Agree with his A/P.  Patient presenting with melena  S/P EGD- Duodenal ulcer with pigmented material injected with epi and BICAP cautery.  Hemodynamically stable. No further bleeds.  D/w Dr Peña; will monitor overnight.  Continue IV PPI BID  ADAT  Hb in AM  Likely discharge in AM

## 2019-12-29 NOTE — CONSULTS
Consult Date:  12/29/2019      REASON FOR CONSULTATION:  Melena.        We were asked to see Dr. Gimenez by Dr. Santos for further evaluation of melena.      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  He stated he has been noting darker stools than usual for 2 days, but did not think it was true melena.  He did his shift last night, became lightheaded and felt ill enough to call a car to take him to the hospital rather than drive himself.  Initial hemoglobin was 11.5; with hydration, has dropped to 9.4.  Interestingly, he has no history of a predisposing factor for a GI bleed.  He has had colonoscopies done through my office in the past with most recent   one done in 01/2018.  At that time, a single small polyp was identified.  He did have moderate diverticulosis noted in the sigmoid colon.  He has never had an upper endoscopy through my office.  The patient is a nonsmoker, nondrinker.  His only NSAID use is a baby aspirin that he takes with simvastatin for an elevated cholesterol.  He has no significant cardiac disease.  He does remember taking a few doses of ibuprofen a few weeks ago when he strained his back shoveling snow, but none recently.      PAST MEDICAL HISTORY:  Rather unremarkable otherwise.  He did have a blood glucose on admission of 172, but there is no history of diabetes.  No prior surgeries, according to the patient.      MEDICATIONS:  As noted above.      FAMILY HISTORY:  Grandfather with colon cancer in his 80s.      REVIEW OF SYSTEMS:  A 10-point review is completely negative.      PHYSICAL EXAMINATION:   GENERAL:  Well-developed, well-nourished gentleman in no acute distress.   VITAL SIGNS:  Temperature is 98.5, pulse is 82, blood pressure 121/74.  Notably, he did have a low pressure of 99 systolic shortly after admission and a pulse of about 120 when he was first admitted.   HEENT:  Pupils round, reactive to light.   Pharynx benign.  No oral lesions are appreciated.   NECK:  Supple.  No adenopathy or thyromegaly.   COR:  Normal S1, S2.   CHEST:  Clear to percussion and auscultation.   ABDOMEN:  Benign.  Bowel sounds present.  No hepatosplenomegaly.  Nontender.  Bowel sounds active.   EXTREMITIES:  Without cyanosis, clubbing or edema.   SKIN:  Normal:   MENTAL STATUS:  Alert and oriented x 3.      LABORATORY DATA:  BUN and creatinine 15 and 0.88.  Electrolytes are unremarkable.  Liver function tests are normal.  Albumin 3.2.  Cholesterol 202, LDL cholesterol 118.  Platelets 244,000, white count 13,000.  EKG on admission unremarkable.      ASSESSMENT:  Gastrointestinal bleed, likely upper.  Other possibility would be a diverticular bleed.  No obvious risk factors for this.      PLAN:  PPI therapy, which has already been instituted.  We will begin evaluation with upper endoscopy this morning.         KALIN LICONA MD             D: 2019   T: 2019   MT: BREONNA      Name:     SCOTT JOHNSTON   MRN:      [id number]        Account:       [id number]   :      1960           Consult Date:  2019      Document: [id number]       cc: GEENA CAMACHO MD

## 2019-12-29 NOTE — PROVIDER NOTIFICATION
Brief update:    AM vitals w/ BP drifting down to 100 range.  Asymptomatic.  HR better in the 70s  No further melena reported    Still on 125/hr LR    Bolus remaining 500 ml LR in hanging bag (question if BP is related to ongoing blood loss; HR reassuring)  Orthostatic blood pressure and pulse to assess intravascular volume status after fluid bolus when more awake this AM (still trying to sleep per report)    Awaiting AM hgb    Lew Santana MD  5:26 AM

## 2019-12-29 NOTE — ED NOTES
Bed: ED10  Expected date:   Expected time:   Means of arrival:   Comments:  Joanne 1 - 59M GI bleed, tarry stool - ETA 5min

## 2019-12-29 NOTE — PLAN OF CARE
VSS. Monitor shows Sinus rhythm. Pt. Denies pain. Protonix drip at 8 mg/hr.  Pt. Sent for Upper endoscopy. Plan for pt. To transfer to [facility] Room [facility]. Report given to receiving nurse Talia.

## 2019-12-29 NOTE — H&P
[facility]    History and Physical - Hospitalist Service       Date of Admission:  12/28/2019    Assessment & Plan   Alek Gimenez is a 59 year old male admitted on 12/28/2019. He presents with 2 days of dark stool now with tarry melena and orthostasis concerning for acute upper GI bleed.    Acute upper GI hemorrhage: Patient with 2 days of dark stools, tarry melenic stool this evening associated with lightheadedness.  Takes daily baby aspirin, and reports last week he took 3 doses of ibuprofen 400 mg for a strained back.  No prior history of GI bleed or ulcer disease.  No significant history of GERD, reports very occasionally taking a Tums.  History of colonic polyps with last colonoscopy this past year by [facility]. Baseline hemoglobin from 2006 in the 14 range, currently 11.5.  -Additional 1L NS bolus; received 1 L normal saline in the emergency department.    -[facility] consulted  -LR at 125/h  -Continuing Protonix infusion from the emergency department.  If continuous IV fluids are disruptive, can transition to bolus dose of Protonix and bolus fluids.  -Repeat hemoglobin 6 AM, noon  -Repeat CBC 12/30 a.m.  -N.p.o. per anesthesia guidelines anticipating EGD later today  -No NSAIDs, holding aspirin  -Patient was consented for blood products by Dr. Traylor in the emergency permit    Hyperlipidemia: Last lipid panel 12/16/2019 with a cholesterol of 202, , triglycerides 219  -Prior to admission simvastatin 40 held    Hyperglycemia: Patient presents with a blood glucose of 172.  Reports that his last oral intake was around 6 PM.  No history of diabetes, and blood glucose of 94 noted in March 2009 at this time, holding on dextrose containing fluids.  -Hemoglobin A1c pending       Diet: NPO per anesthesia guidelines  DVT Prophylaxis: Pneumatic Compression Devices  Matthew Catheter: not present  Code Status: Full code    Disposition Plan   Expected discharge: ~2d pending  EGD findings, recommended to prior living arrangement once GI eval complete.  Entered: Lew Santana MD 12/29/2019, 1:08 AM     The patient's care was discussed with the Patient, Dr Traylor in the emergency department.    Lew Santana MD  [facility]    ______________________________________________________________________    Chief Complaint   Melenic stool, lightheadedness    History obtained from chart review, discussion with patient, discussion with Dr. Traylor in the emergency department    History of Present Illness   Alek Gimenez is a 59 year old male who presents via EMS to the emergency department for evaluation of orthostatic presyncope in the setting of melenic stool.    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  He notes that this was a color change, though otherwise felt fairly well.  No prior history of GI bleed, does not take iron supplementation or Pepto-Bismol.  No diarrhea.  Patient was actually able to work his scheduled shift 12/28/2019 a.m., though did note that throughout his shift he started to feel slightly rundown and more lightheaded.  In retrospect, he identified that his heart was racing when he would walk upstairs and he was more short of breath with exertion in the past days.  Tonight, 12/28/2019, at around 6 PM he went to his work holiday party with his colleagues.  Ate a small amount, and had a small amount of nausea associated with this, no emesis.  Did not feel well enough to stay for activities including bowling afterwards, and went home.  At home, patient had a tarry melenic stool, felt lightheaded and diaphoretic with this.  Patient noted some blood around melenic stool as well.  Patient's family was not home, and given his degree of lightheadedness, put his clothes on and contacted EMS for transport to the emergency department as he did not feel safe driving into the emergency department.   Essentially he laid on the ground after putting his pants on while awaiting EMS arrival as he felt near syncopal.  He tells me that if his family had been home, he likely would have transported by private vehicle with his family driving to further characterize the severity of his symptoms.  In the emergency department, patient's hemoglobin was noted to be 11.5.  No recent baseline, though distant baseline in the 14 range.  Does not have a history of anemia otherwise.  Heart rate was in the 125 range on arrival, improved to the  range following administration of 1 L normal saline.  Patient reports that his lightheadedness has resolved here in the emergency department.    Patient takes a daily baby aspirin for cardiovascular prevention.  He reports that approximately 1 week ago he took 3 doses of ibuprofen 400 mg after he strained his back, though has not used NSAIDs otherwise.    No significant history of GERD.  He tells me that he will occasionally take a Tums, though this is quite infrequent.  No family history of GI ulcers or GI bleeds.  Grandfather had colon cancer in his late 70s.  Patient has undergone 2 colonoscopies, 6 years ago and this past year through [facility].  He reports that he had small polyps on his initial colonoscopy which resulted in repeat at 5 years.    Some mild nausea tonight as described above, otherwise has not felt nauseous, has not had abdominal pain.  no prior history of hematemesis or hemoptysis.  Does not have issues with epistaxis.  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  He reports that his father also had this, no other known telangiectasias.      Initiated on Protonix drip in the emergency department following bolus dosing.  Discussed transition back to bolus dosing if he finds continuous infusion to be bothersome/limit sleep as bolus dosing likely equally efficacious.  Discussed n.p.o. status with likely endoscopy tomorrow  pending GI evaluation.  Additional fluid bolus given symptoms of orthostasis and tachycardia concerning for more significant blood volume loss.    Review of Systems    The 10 point Review of Systems is negative other than noted in the HPI or here.   Mild nausea with no emesis  No diarrhea, though tarry stool this evening  No abdominal discomfort  Lightheadedness without fall or loss of consciousness  Shortness of breath with ambulation over the preceding days  Rapid palpitations with ambulation over the past 2 days  Recent low back strain without current complaint of this  Unaware of any prior issues with hyperglycemia    Past Medical History    I have reviewed this patient's medical history and updated it with pertinent information if needed.   Past Medical History:   Diagnosis Date     Hyperlipidemia, unspecified hyperlipidemia type 2019   erectile dysfunction  Adhesive capsulitis of L shoulder    Past Surgical History   I have reviewed this patient's surgical history and updated it with pertinent information if needed.  History reviewed. No pertinent surgical history. (colonoscopy, otherwise no past surgery)    Social History   I have reviewed this patient's social history and updated it with pertinent information if needed.  Social History     Tobacco Use     Smoking status: Never Smoker     Smokeless tobacco: Never Used   Substance Use Topics     Alcohol use: Yes     Comment: 1 drink per month     Drug use: No       Family History   I have reviewed this patient's family history and updated it with pertinent information if needed.   Mother with CAD in her 60s  Paternal grandfather with colon cancer in his late 70s     Prior to Admission Medications   Prior to Admission Medications   Prescriptions Last Dose Informant Patient Reported? Taking?   aspirin 81 MG tablet   Yes No   Sig: Take 1 tablet (81 mg) by mouth daily   sildenafil (VIAGRA) 100 MG tablet   No No   Si.5-1 tablets (50mg-100 mg) by mouth  daily prn for erectile dysfunction prior to intercourse. Avoid nitroglycerin, terazosin or doxazosin.   simvastatin (ZOCOR) 40 MG tablet   No No   Sig: Take 1 tablet (40 mg) by mouth At Bedtime      Facility-Administered Medications: None     Allergies   No Known Allergies    Physical Exam   Vital Signs: Temp: 98.5  F (36.9  C) Temp src: Oral BP: 106/68 Pulse: 94 Heart Rate: 105 Resp: 16 SpO2: 95 % O2 Device: None (Room air)    Weight: 210 lbs 0 oz    General Appearance: Well-appearing 59-year-old male laying comfortably in bed.  No distress.  Eyes: No scleral icterus or injection.  HEENT: Normocephalic.Posterior oropharynx without blood products or erythema.  No vascular malformations of lip or tongue appreciated.  Respiratory: Breath sounds are clear bilaterally with good air movement throughout lung fields.  No wheezes or crackles.  Cardiovascular: Regular rate and rhythm.  Heart rate currently in the 80s while laying at rest.  No murmur.  GI: abdomen soft, non-tender to palpation. No mass. Urge to urinate w/ palpation of lower abdomen  Lymph/Hematologic: No lower extremity swelling.  Genitourinary: Not examined  Skin: Subcentimeter nodular telangiectatic angioma of left naris.  Several cherry angiomas as well as seborrheic keratoses of back.  Musculoskeletal: Mild pes cavus  Neurologic: Alert, conversant, appropriate in conversation.  Psychiatric: Normal affect, very pleasant    Data   Data reviewed today: I reviewed all medications, new labs and imaging results over the last 24 hours. I personally reviewed no images or EKG's today.    Recent Labs   Lab 12/28/19  2320   WBC 13.0*   HGB 11.5*   MCV 89         POTASSIUM 3.8   CHLORIDE 107   CO2 25   BUN 50*   CR 0.88   ANIONGAP 7   MARIA ESTHER 8.2*   *   ALBUMIN 3.2*   PROTTOTAL 6.3*   BILITOTAL 0.4   ALKPHOS 88   ALT 30   AST 14

## 2019-12-30 VITALS
HEART RATE: 87 BPM | BODY MASS INDEX: 28.3 KG/M2 | SYSTOLIC BLOOD PRESSURE: 126 MMHG | RESPIRATION RATE: 16 BRPM | TEMPERATURE: 98 F | DIASTOLIC BLOOD PRESSURE: 79 MMHG | WEIGHT: 208.9 LBS | HEIGHT: 72 IN | OXYGEN SATURATION: 93 %

## 2019-12-30 LAB — HGB BLD-MCNC: 9.9 G/DL (ref 13.3–17.7)

## 2019-12-30 PROCEDURE — 99238 HOSP IP/OBS DSCHRG MGMT 30/<: CPT | Performed by: INTERNAL MEDICINE

## 2019-12-30 PROCEDURE — 36415 COLL VENOUS BLD VENIPUNCTURE: CPT | Performed by: INTERNAL MEDICINE

## 2019-12-30 PROCEDURE — 85018 HEMOGLOBIN: CPT | Performed by: INTERNAL MEDICINE

## 2019-12-30 RX ORDER — PANTOPRAZOLE SODIUM 40 MG/1
40 TABLET, DELAYED RELEASE ORAL 2 TIMES DAILY
Qty: 60 TABLET | Refills: 1 | Status: SHIPPED | OUTPATIENT
Start: 2019-12-30 | End: 2021-05-06

## 2019-12-30 ASSESSMENT — ACTIVITIES OF DAILY LIVING (ADL)
ADLS_ACUITY_SCORE: 11

## 2019-12-30 ASSESSMENT — MIFFLIN-ST. JEOR: SCORE: 1800.56

## 2019-12-30 NOTE — DISCHARGE SUMMARY
[facility]    Discharge Summary  Hospitalist    Date of Admission:  12/28/2019  Date of Discharge:  12/30/2019  Discharging Provider: Sharan Santos MD    Discharge Diagnoses     Upper GI bleed due to duodenal ulcer  Acute blood loss anemia due to #1 above.  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 He presented with 2 days of dark stool now with tarry melena and orthostasis concerning for acute upper GI bleed.     Upper GI bleed due to duodenal ulcer:   -Patient with 2 days of dark stools, tarry melenic stool this evening associated with lightheadedness.  Takes daily baby aspirin, and reports last week he took 3 doses of ibuprofen 400 mg for a strained back.  No prior history of GI bleed or ulcer disease.    -Baseline hemoglobin from 2006 in the 14 range, which dropped to as low as 9.4 during the hospital stay.  -[place] [facility] consulted  -Underwent upper GI endoscopy on 12/29 and was found to have 1 nonbleeding duodenal ulcer with pigmented material.  The largest dimension was 5 mm.  Area was successfully injected with epinephrine.  Coagulation for bleeding prevention using bipolar probe cautery.  Biopsies were taken for H. pylori testing.  Also was found to have 2 nodules in the distal esophagus, 2 mm size acanthosis versus papilloma  -Hemoglobin subsequently stable at 9.9 on the day of discharge.  -Initially maintained on Protonix infusion which will be switched to Protonix 40 mg p.o. twice daily for 2 months.  -Discontinue aspirin(taking for primary prophylaxis)  -No NSAIDs   -Discharge today with outpatient follow-up with [facility] to discuss biopsy results    Hyperglycemia: Patient presents with a blood glucose of 172.    -Likely related to stress, hemoglobin A1c was 5.3.    Sharan Santos MD    Significant Results and Procedures   See below    Pending Results     Unresulted Labs Ordered in the Past 30 Days of  this Admission     No orders found from 11/28/2019 to 12/29/2019.          Code Status   Full Code       Primary Care Physician   Shayy Manzo    Physical Exam   Temp: 97.9  F (36.6  C) Temp src: Oral BP: 94/54 Pulse: 87 Heart Rate: 68 Resp: 16 SpO2: 94 % O2 Device: None (Room air) Oxygen Delivery: 3 LPM    Constitutional: AAOX3, NAD  Respiratory: CTA B/L, Normal WOB  Cardiovascular: RRR, No murmur  GI: Soft, Non- tender, BS- normoactive  Neuro: CN- grossly intact    Discharge Disposition   Discharged to home  Condition at discharge: Stable    Consultations This Hospital Stay   GASTROENTEROLOGY IP CONSULT    Time Spent on this Encounter   I, Sharan Santos MD, personally saw the patient today and spent less than or equal to 30 minutes discharging this patient.    Discharge Orders      Follow-up and recommended labs and tests    Follow up with primary care provider, Shayy Manzo, within 7 days for hospital follow- up.    [facility] in 1-2 weeks     Activity    Your activity upon discharge: activity as tolerated     Full Code     Diet    Follow this diet upon discharge: Orders Placed This Encounter      Advance Diet as Tolerated: Regular Diet Adult       Discharge Medications   Current Discharge Medication List      START taking these medications    Details   pantoprazole (PROTONIX) 40 MG EC tablet Take 1 tablet (40 mg) by mouth 2 times daily  Qty: 60 tablet, Refills: 1    Comments: Future refills by PCP Dr. Shayy Rubio [facility] with phone number [phone].  Associated Diagnoses: Upper GI bleeding         CONTINUE these medications which have NOT CHANGED    Details   sildenafil (VIAGRA) 100 MG tablet 0.5-1 tablets (50mg-100 mg) by mouth daily prn for erectile dysfunction prior to intercourse. Avoid nitroglycerin, terazosin or doxazosin.  Qty: 10 tablet, Refills: 5    Associated Diagnoses: Erectile dysfunction, unspecified erectile dysfunction type      simvastatin  (ZOCOR) 40 MG tablet Take 1 tablet (40 mg) by mouth At Bedtime  Qty: 90 tablet, Refills: 3    Associated Diagnoses: Hyperlipidemia LDL goal <100         STOP taking these medications       aspirin 81 MG tablet Comments:   Reason for Stopping:             Allergies   No Known Allergies  Data   Most Recent 3 CBC's:  Recent Labs   Lab Test 12/30/19  0607 12/29/19  1129 12/29/19  0540 12/28/19  2320   WBC  --   --   --  13.0*   HGB 9.9* 9.5* 9.4* 11.5*   MCV  --   --   --  89   PLT  --   --   --  244      Most Recent 3 BMP's:  Recent Labs   Lab Test 12/28/19  2320 03/06/19  1107    140   POTASSIUM 3.8 4.5   CHLORIDE 107 106   CO2 25 28   BUN 50* 20   CR 0.88 1.02   ANIONGAP 7 6   MARIA ESTHER 8.2* 9.5   * 94     Most Recent 2 LFT's:  Recent Labs   Lab Test 12/28/19  2320 03/06/19  1107   AST 14 18   ALT 30 41   ALKPHOS 88 104   BILITOTAL 0.4 0.5     Most Recent INR's and Anticoagulation Dosing History:  Anticoagulation Dose History     There is no flowsheet data to display.        Most Recent 3 Troponin's:No lab results found.  Most Recent Cholesterol Panel:  Recent Labs   Lab Test 12/16/19  1153   CHOL 202*   *   HDL 40   TRIG 219*     Most Recent 6 Bacteria Isolates From Any Culture (See EPIC Reports for Culture Details):No lab results found.  Most Recent TSH, T4 and A1c Labs:  Recent Labs   Lab Test 12/29/19  0540   A1C 5.3       Results for orders placed or performed in visit on 08/30/05   X-RAY SHOULDER 2+ VW    Impression       Exam: Right shoulder, August 30, 2005.     Indication: Pain.     Findings:     Standard views show normal appearing glenohumeral joint, normal  proximal humerus, lateral clavicle, scapula, upper right ribs. No   soft  tissue calcifications.     Impression: Negative study

## 2019-12-30 NOTE — PLAN OF CARE
Alert and oriented x4. VSS on RA. Up independently in the room. Tele SR. Denies any pain/sob. Regular diet. Monitoring hemoglobins every 6 hours. Will continue to monitor.

## 2019-12-30 NOTE — PROGRESS NOTES
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  He is feeling well, without abd pain, tolerating diet, passing flatus, but no stool.  Discussed his risk/benefit of prophylactic ASA use in the future which seemingly would necessitate PPI given this DU (pending H pylori status).  For now, he agrees to stay off ASA (no prior stroke, AMI).  Will need treatment for H pylori if positive, otherwise will need PPI therapy for 2 months, which thereafter can be discontinued.     Alex Wilkes DO   [facility]  Cell [phone]

## 2019-12-31 ENCOUNTER — TELEPHONE (OUTPATIENT)
Dept: FAMILY MEDICINE | Facility: CLINIC | Age: 59
End: 2019-12-31

## 2019-12-31 NOTE — TELEPHONE ENCOUNTER
"ED / Discharge Outreach Protocol    Patient Contact    Attempt # 1    Was call answered?  Yes.  \"May I please speak with <Alek >\"  Is patient available?   Yes    ED/Discharge Protocol    \"Hi, my name is Katy Krueger RN, a registered nurse, and I am calling on behalf of Dr. Agosto's office at [place].  I am calling to follow up and see how things are going for you after your recent visit.\"    \"I see that you were in the (ER/UC/IP) on 12/28/19-12/30/19.    How are you doing now that you are home?\" doing well - SOB with activity now - shoveling. Taking it easy     Is patient experiencing symptoms that may require a hospital visit?  No     Discharge Instructions    \"Let's review your discharge instructions.  What is/are the follow-up recommendations?  Pt. Response: follow up with provider     \"Were you instructed to make a follow-up appointment?\"  Pt. Response: Yes.  Has appointment been made?   No.  \"Can I help you schedule that appointment?\" No, pt declined. States he does not want to schedule a follow up for \"a couple of months\" - did ask for lab orders, but advised this would be placed at an OV, strongly recommended OV       \"When you see the provider, I would recommend that you bring your discharge instructions with you.    Medications    \"How many new medications are you on since your hospitalization/ED visit?\"    0-1  \"How many of your current medicines changed (dose, timing, name, etc.) while you were in the hospital/ED visit?\"   0-1  \"Do you have questions about your medications?\"   No  \"Were you newly diagnosed with heart failure, COPD, diabetes or did you have a heart attack?\"   No  For patients on insulin: \"Did you start on insulin in the hospital or did you have your insulin dose changed?\"   No  Post Discharge Medication Reconciliation Status: discharge medications reconciled, continue medications without change.    Was MTM referral placed (*Make sure to put transitions as reason for " Derm should be calling her, but note before.      Notes recorded by Kimmie Pool NP on 6/6/2018 at 9:26 AM CDT  Culture showed staph normally found on the skin. We will wait for VZV result and contact her.   "referral)?   No    Call Summary    \"Do you have any questions or concerns about your condition or care plan at the moment?\"    No  Triage nurse advice given    Patient was in ER 1x in the past year (assess appropriateness of ER visits.)      \"If you have questions or things don't continue to improve, we encourage you contact us through the main clinic number,  ([phone]).  Even if the clinic is not open, triage nurses are available 24/7 to help you.     We would like you to know that our clinic has extended hours (provide information).  We also have urgent care (provide details on closest location and hours/contact info)\"    \"Thank you for your time and take care!\"  Katy HECK RN      "

## 2019-12-31 NOTE — TELEPHONE ENCOUNTER
Chief Complaint: Gastrointestinal Hemorrhage, Unspecified Gastrointestinal Hemorrhage Type, Upper Gi Bleeding,  MON 30-DEC-2019  0 / 1    [phone] (home)

## 2020-01-03 LAB — COPATH REPORT: NORMAL

## 2020-05-18 ENCOUNTER — MYC REFILL (OUTPATIENT)
Dept: FAMILY MEDICINE | Facility: CLINIC | Age: 60
End: 2020-05-18

## 2020-05-18 DIAGNOSIS — E78.5 HYPERLIPIDEMIA LDL GOAL <100: ICD-10-CM

## 2020-05-18 RX ORDER — SIMVASTATIN 40 MG
40 TABLET ORAL AT BEDTIME
Qty: 90 TABLET | Refills: 3 | Status: CANCELLED | OUTPATIENT
Start: 2020-05-18

## 2020-05-19 ENCOUNTER — RESULTS ONLY (OUTPATIENT)
Dept: LAB | Age: 60
End: 2020-05-19

## 2020-05-19 ENCOUNTER — APPOINTMENT (OUTPATIENT)
Dept: LAB | Facility: CLINIC | Age: 60
End: 2020-05-19
Payer: COMMERCIAL

## 2020-05-20 ENCOUNTER — MYC REFILL (OUTPATIENT)
Dept: FAMILY MEDICINE | Facility: CLINIC | Age: 60
End: 2020-05-20

## 2020-05-20 DIAGNOSIS — E78.5 HYPERLIPIDEMIA LDL GOAL <100: ICD-10-CM

## 2020-05-22 LAB
COVID-19 SPIKE RBD ABY TITER: NORMAL
COVID-19 SPIKE RBD ABY: NEGATIVE

## 2020-05-25 NOTE — TELEPHONE ENCOUNTER
Routing refill request to provider for review/approval because:  Patient needs to be seen because it has been more than 1 year since last office visit.  Last Written Prescription Date:  3/6/2019  Last Fill Quantity: 90,  # refills: 3   Last office visit: 3/6/2019 with prescribing provider:     Future Office Visit:    Trupti Pollard RN    Patient has already been sent a message to schedule a visit for refills via My Chart  No Appointment scheduled as yet.  Pended a 1 month supply.  Trupti Pollard RN

## 2020-05-26 RX ORDER — SIMVASTATIN 40 MG
40 TABLET ORAL AT BEDTIME
Qty: 30 TABLET | Refills: 0 | Status: SHIPPED | OUTPATIENT
Start: 2020-05-26 | End: 2020-08-25

## 2020-06-08 ENCOUNTER — VIRTUAL VISIT (OUTPATIENT)
Dept: FAMILY MEDICINE | Facility: OTHER | Age: 60
End: 2020-06-08

## 2020-06-08 ENCOUNTER — E-VISIT (OUTPATIENT)
Dept: FAMILY MEDICINE | Facility: CLINIC | Age: 60
End: 2020-06-08
Payer: COMMERCIAL

## 2020-06-08 DIAGNOSIS — N52.9 ERECTILE DYSFUNCTION, UNSPECIFIED ERECTILE DYSFUNCTION TYPE: ICD-10-CM

## 2020-06-08 PROCEDURE — 99207 ZZC NO BILLABLE SERVICE THIS VISIT: CPT | Performed by: INTERNAL MEDICINE

## 2020-06-08 RX ORDER — SILDENAFIL 100 MG/1
TABLET, FILM COATED ORAL
Qty: 30 TABLET | Refills: 1 | Status: SHIPPED | OUTPATIENT
Start: 2020-06-08 | End: 2022-12-06

## 2020-06-08 NOTE — PROGRESS NOTES
"Date: 2020 13:23:47  Clinician: Jaja Wei  Clinician NPI: [id number]  Patient: Alek Gimenez  Patient : 1960  Patient Address: [facility] [place] [address]  Patient Phone: [phone]  Visit Protocol: URI  Patient Summary:  Alek is a 60 year old ( : 1960 ) male who initiated a Visit for COVID-19 (Coronavirus) evaluation and screening. When asked the question \"Please sign me up to receive news, health information and promotions from [employer].\", Alek responded \"No\".    Alek states his symptoms started gradually 5-6 days ago.   His symptoms consist of a sore throat, a cough, and a headache.   Symptom details     Cough: Alek coughs a few times an hour and his cough is not more bothersome at night. Phlegm does not come into his throat when he coughs. He does not believe his cough is caused by post-nasal drip.     Sore throat: Alek reports having mild throat pain (1-3 on a 10 point pain scale), does not have exudate on his tonsils, and can swallow liquids. The lymph nodes in his neck are not enlarged. A rash has not appeared on the skin since the sore throat started.     Headache: He states the headache is mild (1-3 on a 10 point pain scale).      Alek denies having wheezing, nausea, teeth pain, ageusia, diarrhea, enlarged lymph nodes, malaise, myalgias, anosmia, facial pain or pressure, fever, nasal congestion, vomiting, rhinitis, ear pain, and chills. He also denies having recent facial or sinus surgery in the past 60 days, double sickening (worsening symptoms after initial improvement), and taking antibiotic medication for the symptoms. He is not experiencing dyspnea.   Precipitating events  Within the past week, Alek has not been exposed to someone with strep throat. He has not recently been exposed to someone with influenza. Alek has not been in close contact with any high risk individuals.   Pertinent COVID-19 (Coronavirus) information  In the past 14 days, Alek has not " worked in a congregate living setting.   He either works or volunteers as a healthcare worker or a , or works or volunteers in a healthcare facility. He provides direct patient care. 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 He does not live with a healthcare worker.   Alek has had a close contact with a laboratory-confirmed COVID-19 patient within 14 days of symptom onset. He was exposed at his work. 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   Weight: 210 lbs   Alek does not smoke or use smokeless tobacco.   Additional information as reported by the patient (free text): I'm not very ill but am worried about COVID since I've cared for many COVID patients   Weight: 210 lbs    MEDICATIONS: simvastatin oral, ALLERGIES: NKDA  Clinician Response:  Dear Alek,   Your symptoms show that you may have coronavirus (COVID-19). This illness can cause fever, cough and trouble breathing. Many people get a mild case and get better on their own. Some people can get very sick.  What should I do?  We would like to test you for this virus. This will be a curbside test done outside the clinic.   1. Since you're an [employer] employee, please contact [facility] to be tested. The phone number for the [facility] hotline is [phone].    2.Starting now: Stay home and away from others (self-isolate) until:    You've had no fever---and no medicine that reduces fever---for 3 full days (72 hours). And...   Your other symptoms have gotten better. For example, your cough or breathing has improved. And...  "  At least 10 days have passed since your symptoms started.       During this time, don't leave the house except for testing or medical care.   Stay in your own room, even for meals. Use your own bathroom if you can.   Stay away from others in your home. No hugging, kissing or shaking hands. No visitors.  Don't go to work, school or anywhere else.    Clean \"high touch\" surfaces often (doorknobs, counters, handles, etc.). Use a household cleaning spray or wipes. You'll find a full list of  on the EPA website: www.epa.gov/pesticide-registration/list-n-disinfectants-use-against-sars-cov-2.   Cover your mouth and nose with a mask, tissue or washcloth to avoid spreading germs.  Wash your hands and face often. Use soap and water.  Caregivers in these groups are at risk for severe illness due to COVID-19:  o People 65 years and older  o People who live in a nursing home or long-term care facility  o People with chronic disease (lung, heart, cancer, diabetes, kidney, liver, immunologic)  o People who have a weakened immune system, including those who:   Are in cancer treatment  Take medicine that weakens the immune system, such as corticosteroids  Had a bone marrow or organ transplant  Have an immune deficiency  Have poorly controlled HIV or AIDS  Are obese (body mass index of 40 or higher)  Smoke regularly   o Caregivers should wear gloves while washing dishes, handling laundry and cleaning bedrooms and bathrooms.  o Use caution when washing and drying laundry: Don't shake dirty laundry, and use the warmest water setting that you can.  o For more tips, go to www.cdc.gov/coronavirus/2019-ncov/downloads/10Things.pdf.      How can I take care of myself?   Get lots of rest. Drink extra fluids (unless a doctor has told you not to).   Take Tylenol (acetaminophen) for fever or pain. If you have liver or kidney problems, ask your family doctor if it's okay to take Tylenol.   Adults can take either:    650 mg (two 325 mg " pills) every 4 to 6 hours, or...   1,000 mg (two 500 mg pills) every 8 hours as needed.    Note: Don't take more than 3,000 mg in one day. Acetaminophen is found in many medicines (both prescribed and over-the-counter medicines). Read all labels to be sure you don't take too much.   For children, check the Tylenol bottle for the right dose. The dose is based on the child's age or weight.    If you have other health problems (like cancer, heart failure, an organ transplant or severe kidney disease): Call your specialty clinic if you don't feel better in the next 2 days.       Know when to call 911. Emergency warning signs include:    Trouble breathing or shortness of breath Pain or pressure in the chest that doesn't go away Feeling confused like you haven't felt before, or not being able to wake up Bluish-colored lips or face  3.Sign up for [employer]. We know it's scary to hear that you might have COVID-19. We want to track your symptoms to make sure you're okay over the next 2 weeks. Please look for an email from [employer]---this is a free, online program that we'll use to keep in touch. To sign up, follow the link in the email. Learn more at [email or web address].       Where can I get more information?   [facility] [place] -- About COVID-19: [email or web address]   CDC -- What to Do If You're Sick: www.cdc.gov/coronavirus/2019-ncov/about/steps-when-sick.html   CDC -- Ending Home Isolation: www.cdc.gov/coronavirus/2019-ncov/hcp/disposition-in-home-patients.html   CDC -- Caring for Someone: www.cdc.gov/coronavirus/2019-ncov/if-you-are-sick/care-for-someone.html   [facility] -- Interim Guidance for Hospital Discharge to Home: www.health.[facility].[place]/diseases/coronavirus/hcp/hospdischarge.pdf   [facility] clinical trials (COVID-19 research studies): clinicalaffairs.[facility]/n-clinical-trials    Below are the COVID-19 hotlines at the [place] Department of Health ([facility]). Interpreters are  available.    For health questions: Call 209-503-9666 or 1-899.993.9757 (7 a.m. to 7 p.m.) For questions about schools and childcare: Call 880-776-5678 or 1-785.946.2012 (7 a.m. to 7 p.m.)    Diagnosis: Cough  Diagnosis ICD: R05  Additional Clinician Notes: Thank you for working in our COVID ICUs!

## 2020-06-27 DIAGNOSIS — E78.5 HYPERLIPIDEMIA LDL GOAL <100: ICD-10-CM

## 2020-06-30 RX ORDER — SIMVASTATIN 40 MG
TABLET ORAL
Qty: 30 TABLET | Refills: 0 | OUTPATIENT
Start: 2020-06-30

## 2020-08-25 ENCOUNTER — VIRTUAL VISIT (OUTPATIENT)
Dept: FAMILY MEDICINE | Facility: CLINIC | Age: 60
End: 2020-08-25
Payer: COMMERCIAL

## 2020-08-25 DIAGNOSIS — J45.20 MILD INTERMITTENT REACTIVE AIRWAY DISEASE WITHOUT COMPLICATION: Primary | ICD-10-CM

## 2020-08-25 DIAGNOSIS — E78.5 HYPERLIPIDEMIA LDL GOAL <100: ICD-10-CM

## 2020-08-25 PROCEDURE — 99214 OFFICE O/P EST MOD 30 MIN: CPT | Mod: 95 | Performed by: INTERNAL MEDICINE

## 2020-08-25 RX ORDER — SIMVASTATIN 40 MG
40 TABLET ORAL AT BEDTIME
Qty: 90 TABLET | Refills: 1 | Status: SHIPPED | OUTPATIENT
Start: 2020-08-25 | End: 2021-02-12

## 2020-08-25 RX ORDER — ALBUTEROL SULFATE 90 UG/1
2 AEROSOL, METERED RESPIRATORY (INHALATION) EVERY 4 HOURS PRN
Qty: 1 INHALER | Refills: 3 | Status: SHIPPED | OUTPATIENT
Start: 2020-08-25 | End: 2023-03-30

## 2020-08-25 RX ORDER — BUDESONIDE AND FORMOTEROL FUMARATE DIHYDRATE 80; 4.5 UG/1; UG/1
2 AEROSOL RESPIRATORY (INHALATION) 2 TIMES DAILY
Qty: 1 INHALER | Refills: 3 | Status: SHIPPED | OUTPATIENT
Start: 2020-08-25 | End: 2021-03-06

## 2020-08-25 ASSESSMENT — ENCOUNTER SYMPTOMS
WHEEZING: 1
FEVER: 0
CHILLS: 0
SINUS PRESSURE: 0
FATIGUE: 0
SORE THROAT: 0
NAUSEA: 0
SINUS PAIN: 0
SHORTNESS OF BREATH: 1
COUGH: 1
VOMITING: 0

## 2020-08-25 NOTE — LETTER
My Asthma Action Plan    Name: Alek Gimenez   YOB: 1960  Date: 8/25/2020   My doctor: Nikita Agosto MD   My clinic: [facility]        My Rescue Medicine:   Albuterol inhaler (Proair/Ventolin/Proventil HFA)  2-4 puffs EVERY 4 HOURS as needed. Use a spacer if recommended by your provider.   My Asthma Severity:   Intermittent / Exercise Induced  Know your asthma triggers:              GREEN ZONE   Good Control    I feel good    No cough or wheeze    Can work, sleep and play without asthma symptoms       Take your asthma control medicine every day.     1. If exercise triggers your asthma, take your rescue medication    15 minutes before exercise or sports, and    During exercise if you have asthma symptoms  2. Spacer to use with inhaler: If you have a spacer, make sure to use it with your inhaler             YELLOW ZONE Getting Worse  I have ANY of these:    I do not feel good    Cough or wheeze    Chest feels tight    Wake up at night   1. Keep taking your Green Zone medications  2. Start taking your rescue medicine:    every 20 minutes for up to 1 hour. Then every 4 hours for 24-48 hours.  3. If you stay in the Yellow Zone for more than 12-24 hours, contact your doctor.  4. If you do not return to the Green Zone in 12-24 hours or you get worse, start taking your oral steroid medicine if prescribed by your provider.           RED ZONE Medical Alert - Get Help  I have ANY of these:    I feel awful    Medicine is not helping    Breathing getting harder    Trouble walking or talking    Nose opens wide to breathe       1. Take your rescue medicine NOW  2. If your provider has prescribed an oral steroid medicine, start taking it NOW  3. Call your doctor NOW  4. If you are still in the Red Zone after 20 minutes and you have not reached your doctor:    Take your rescue medicine again and    Call 911 or go to the emergency room right away    See your regular doctor within 2 weeks of  an Emergency Room or Urgent Care visit for follow-up treatment.          Annual Reminders:  Meet with Asthma Educator,  Flu Shot in the Fall, consider Pneumonia Vaccination for patients with asthma (aged 19 and older).    Pharmacy: [id number] - [place], [place] - [address] AT [facility]/[address] [place] [address] & [facility]    Electronically signed by Nikita Agosto MD   Date: 08/25/20                    Asthma Triggers  How To Control Things That Make Your Asthma Worse    Triggers are things that make your asthma worse.  Look at the list below to help you find your triggers and   what you can do about them. You can help prevent asthma flare-ups by staying away from your triggers.      Trigger                                                          What you can do   Cigarette Smoke  Tobacco smoke can make asthma worse. Do not allow smoking in your home, car or around you.  Be sure no one smokes at a child s day care or school.  If you smoke, ask your health care provider for ways to help you quit.  Ask family members to quit too.  Ask your health care provider for a referral to Quit Plan to help you quit smoking, or call 7-515-226-PLAN.     Colds, Flu, Bronchitis  These are common triggers of asthma. Wash your hands often.  Don t touch your eyes, nose or mouth.  Get a flu shot every year.     Dust Mites  These are tiny bugs that live in cloth or carpet. They are too small to see. Wash sheets and blankets in hot water every week.   Encase pillows and mattress in dust mite proof covers.  Avoid having carpet if you can. If you have carpet, vacuum weekly.   Use a dust mask and HEPA vacuum.   Pollen and Outdoor Mold  Some people are allergic to trees, grass, or weed pollen, or molds. Try to keep your windows closed.  Limit time out doors when pollen count is high.   Ask you health care provider about taking medicine during allergy season.     Animal Dander  Some people are allergic to skin flakes, urine or  saliva from pets with fur or feathers. Keep pets with fur or feathers out of your home.    If you can t keep the pet outdoors, then keep the pet out of your bedroom.  Keep the bedroom door closed.  Keep pets off cloth furniture and away from stuffed toys.     Mice, Rats, and Cockroaches  Some people are allergic to the waste from these pests.   Cover food and garbage.  Clean up spills and food crumbs.  Store grease in the refrigerator.   Keep food out of the bedroom.   Indoor Mold  This can be a trigger if your home has high moisture. Fix leaking faucets, pipes, or other sources of water.   Clean moldy surfaces.  Dehumidify basement if it is damp and smelly.   Smoke, Strong Odors, and Sprays  These can reduce air quality. Stay away from strong odors and sprays, such as perfume, powder, hair spray, paints, smoke incense, paint, cleaning products, candles and new carpet.   Exercise or Sports  Some people with asthma have this trigger. Be active!  Ask your doctor about taking medicine before sports or exercise to prevent symptoms.    Warm up for 5-10 minutes before and after sports or exercise.     Other Triggers of Asthma  Cold air:  Cover your nose and mouth with a scarf.  Sometimes laughing or crying can be a trigger.  Some medicines and food can trigger asthma.

## 2020-08-25 NOTE — PROGRESS NOTES
"Alek Gimenez is a 60 year old male who is being evaluated via a billable video visit.      The patient has been notified of following:     \"This video visit will be conducted via a call between you and your physician/provider. We have found that certain health care needs can be provided without the need for an in-person physical exam.  This service lets us provide the care you need with a video conversation.  If a prescription is necessary we can send it directly to your pharmacy.  If lab work is needed we can place an order for that and you can then stop by our lab to have the test done at a later time.    Video visits are billed at different rates depending on your insurance coverage.  Please reach out to your insurance provider with any questions.    If during the course of the call the physician/provider feels a video visit is not appropriate, you will not be charged for this service.\"    Patient has given verbal consent for Video visit? Yes  How would you like to obtain your AVS? MyChart  If you are dropped from the video visit, the video invite should be resent to: Text to cell phone: Yedda [phone]  Will anyone else be joining your video visit? No      Video-Visit Details    Video Start Time: 11:15 am  Video End Time: 11:32 am    Originating Location (pt. Location): home    Distant Location (provider location):  [facility]     Platform used for Video Visit: [employer] (Am[employer]Well)      HPI    For the past couple of months, the patient has been experiencing intermittent shortness of breath with occasional wheezing.  No clear precipitating factor.  They have a pet cat at home but he has not noticed any worsening of his symptoms when he is around the cat (did not notice improvement either when he was away from the cat).  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  He does note that his nasal allergies " have been worse this year.  No fever/chills.  Has occasional nonproductive cough.  He has been evaluated for COVID-19 twice and results were negative.    Also needs a refill of his simvastatin for his hyperlipidemia.    No other subjective complaints presented.      Review of Systems   Constitutional: Negative for chills, fatigue and fever.   HENT: Positive for congestion. Negative for sinus pressure, sinus pain and sore throat.    Respiratory: Positive for cough, shortness of breath and wheezing.    Cardiovascular: Negative for chest pain.   Gastrointestinal: Negative for nausea and vomiting.         ICD-10-CM    1. Mild intermittent reactive airway disease without complication  J45.20 albuterol (PROAIR HFA/PROVENTIL HFA/VENTOLIN HFA) 108 (90 Base) MCG/ACT inhaler     budesonide-formoterol (SYMBICORT) 80-4.5 MCG/ACT Inhaler   2. Hyperlipidemia LDL goal <100  E78.5 simvastatin (ZOCOR) 40 MG tablet       Dr. Nikita Agosto

## 2020-11-08 ENCOUNTER — HEALTH MAINTENANCE LETTER (OUTPATIENT)
Age: 60
End: 2020-11-08

## 2021-02-10 DIAGNOSIS — E78.5 HYPERLIPIDEMIA LDL GOAL <100: ICD-10-CM

## 2021-02-11 NOTE — TELEPHONE ENCOUNTER
Simvastatin 40 mg tablets    Summary: Take 1 tablet (40 mg) by mouth At Bedtime, Disp-90 tablet,R-1, E-Prescribe   Dose, Route, Frequency: 40 mg, Oral, AT BEDTIME  Start: 8/25/2020  Ord/Sold: 8/25/2020

## 2021-02-12 RX ORDER — SIMVASTATIN 40 MG
40 TABLET ORAL AT BEDTIME
Qty: 90 TABLET | Refills: 1 | Status: SHIPPED | OUTPATIENT
Start: 2021-02-12 | End: 2021-05-06

## 2021-02-12 NOTE — TELEPHONE ENCOUNTER
Sent PPT Reasearch message advising due for fasting visit to establish with new PCP     Routing refill request to provider for review/approval because:  Labs not current:  LDL   Eufemia refill pended     Katy HECK RN

## 2021-03-06 DIAGNOSIS — J45.20 MILD INTERMITTENT REACTIVE AIRWAY DISEASE WITHOUT COMPLICATION: ICD-10-CM

## 2021-03-06 NOTE — TELEPHONE ENCOUNTER
Symbicort 80/4.5 mcg inh    Summary: Inhale 2 puffs into the lungs 2 times daily, Disp-1 Inhaler,R-3, E-Prescribe   Dose, Route, Frequency: 2 puff, Inhalation, 2 TIMES DAILY  Start: 8/25/2020  Ord/Sold: 8/25/2020

## 2021-03-08 RX ORDER — BUDESONIDE AND FORMOTEROL FUMARATE DIHYDRATE 80; 4.5 UG/1; UG/1
2 AEROSOL RESPIRATORY (INHALATION) 2 TIMES DAILY
Qty: 6.9 G | Refills: 0 | Status: SHIPPED | OUTPATIENT
Start: 2021-03-08 | End: 2021-05-06

## 2021-03-08 NOTE — TELEPHONE ENCOUNTER
Routing refill request to provider for review/approval because:  Patient needs to be seen because it has been more than 6 months since last office visit.  ACT not current    TCs: Please call pt to help schedule appt     Thank you,  Larry ROSA RN

## 2021-03-09 ENCOUNTER — TELEPHONE (OUTPATIENT)
Dept: FAMILY MEDICINE | Facility: CLINIC | Age: 61
End: 2021-03-09

## 2021-03-09 NOTE — TELEPHONE ENCOUNTER
Reason for Call:  Same Day Appointment, Requested Provider:  Dr. Hayes    PCP: [facility]    Reason for visit: est care, Rx refill    Duration of symptoms: NA    Have you been treated for this in the past? 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     Dr. Hayes, would you be willing to take Pt on as he says that you see his wife Humera Fowler.    Can we leave a detailed message on this number? NO but can leave a [employer] message    Phone number patient can be reached at: Home number on file [phone] (home)    Best Time: any    Call taken on 3/9/2021 at 8:59 AM by Pastora Villasenor

## 2021-03-25 ENCOUNTER — OFFICE VISIT (OUTPATIENT)
Dept: FAMILY MEDICINE | Facility: CLINIC | Age: 61
End: 2021-03-25
Payer: COMMERCIAL

## 2021-03-25 VITALS
DIASTOLIC BLOOD PRESSURE: 86 MMHG | HEART RATE: 65 BPM | TEMPERATURE: 99.1 F | BODY MASS INDEX: 29.29 KG/M2 | WEIGHT: 216 LBS | SYSTOLIC BLOOD PRESSURE: 127 MMHG | OXYGEN SATURATION: 98 %

## 2021-03-25 DIAGNOSIS — M51.26 LUMBAR DISC HERNIATION: Primary | ICD-10-CM

## 2021-03-25 PROCEDURE — 99214 OFFICE O/P EST MOD 30 MIN: CPT | Performed by: INTERNAL MEDICINE

## 2021-03-25 NOTE — PROGRESS NOTES
Assessment & Plan     Lumbar disc herniation  I think his symptoms are consistent with a right lumbar disc herniation syndrome.  We discussed that the prognosis for this is generally very good.  However, I am a little concerned about the weakness that is observable on exam and described as progressive.  Therefore we both decided to proceed with a lumbar spine MRI to evaluate further.  Should there be a proven disc herniation syndrome, perhaps a course of oral steroids or even epidural steroids may be a necessary step to control symptoms we also discussed the role of physical therapy.  We discussed that surgery is not commonly needed although rarely necessary depending on the arc of symptoms.  - MR Lumbar Spine w/o Contrast; Future      30 minutes spent on the date of the encounter doing chart review, history and exam, documentation and further activities as noted above           Return in about 5 weeks (around 4/30/2021) for Preventive Visit.  Or return sooner pending MRI results, symptoms    Dirk Hayes MD  [facility] SHANEL Gaston is a 61 year old who presents for the following health issues     HPI     New Patient/Transfer of Care  Musculoskeletal problem/pain  Onset/Duration: x 2 weeks   Description  Location: leg and hip - right  Joint Swelling: no  Redness: no  Pain: YES  Warmth: no  Intensity:  moderate  Progression of Symptoms:  constant  Accompanying signs and symptoms:   Fevers: no  Numbness/tingling/weakness: YES  History  Trauma to the area: no  Recent illness:  no  Previous similar problem: no  Previous evaluation:  no  Precipitating or alleviating factors:  Aggravating factors include: none  Therapies tried and outcome: nothing    This is a nice man with whom I am familiar from my previous practice.  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  He has hyperlipidemia, erectile dysfunction, asthma and more recently was diagnosed with a  duodenal ulcer.  He tells me he was checked for H. pylori and it was negative.  Interestingly, he was not on any NSAIDs at the time of his diagnosis.  He does have significant hemoglobin drop from the ulcer.  He presents today with a 2-week history of right lower back pain with pain radiating down his right leg and a more recent history of right leg weakness particularly at hip flexors that seems to be getting worse.  There is no numbness.  There is no history of antecedent injury or trauma.  The pain seems to get better when he takes over-the-counter Tylenol.  He has been trying to minimize exposure to NSAIDs due to his history of ulcer.  He admits that he does not really exercise very much.  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    Review of Systems         Objective    /86 (BP Location: Right arm, Patient Position: Sitting, Cuff Size: Adult Large)   Pulse 65   Temp 99.1  F (37.3  C) (Temporal)   Wt 98 kg (216 lb)   SpO2 98%   BMI 29.29 kg/m    Body mass index is 29.29 kg/m .  Physical Exam   General: This is a well-appearing, reasonably comfortable appearing man in no acute distress.  Back: There is no midline spine tenderness, straight leg raise does not elicit radicular symptoms bilaterally, however he does have significantly tight hamstrings, sensation to light touch is grossly intact and symmetric in lower extremity dermatomes, he has 4-5 strength with right hip flexors when compared to 5 out of 5 strength on the left, he also has 4-5 strength with right dorsiflexion when compared to 5 out of 5 on the left, plantarflexion is symmetric and 5 out of 5, there is a diminished right patellar tendon reflex when compared to the left, the bilateral Achilles tendon reflexes are 2+ and symmetric  Hips: There is no pain with passive range of motion of the right hip joint and the patient can bear weight on the right hip without any exacerbation of  symptoms

## 2021-03-30 ENCOUNTER — HOSPITAL ENCOUNTER (OUTPATIENT)
Dept: MRI IMAGING | Facility: CLINIC | Age: 61
Discharge: HOME | End: 2021-03-30
Attending: INTERNAL MEDICINE | Admitting: INTERNAL MEDICINE
Payer: COMMERCIAL

## 2021-03-30 DIAGNOSIS — M51.26 LUMBAR DISC HERNIATION: ICD-10-CM

## 2021-03-30 PROCEDURE — 72148 MRI LUMBAR SPINE W/O DYE: CPT

## 2021-03-30 NOTE — LETTER
March 31, 2021      Alek Gimenez  [address] [place] [address]        Dear ert,    The following letter pertains to your most recent diagnostic tests:     The MRI confirms our suspicion of an L3 radiculopathy from a disc herniation.       As we discussed, typically the long term prognosis is good in these situations.     Physical therapy may help.  Call [phone] to schedule physical therapy sessions at any of the locations of the [facility] if you desire to do so.   A short course of oral dexamethasone is also an option to relieve symptoms if needed.  I sent an prescription for that medication to your pharmacy.  Inform me if symptoms fail to improve after an additional 3-4 weeks or inform me sooner if symptoms worsen or new symptom develop.     Resulted Orders   MR Lumbar Spine w/o Contrast    Narrative    EXAM: MR LUMBAR SPINE W/O CONTRAST  LOCATION: [facility]  DATE/TIME: 3/30/2021 5:04 PM    INDICATION: Low back pain, progressive neurologic deficit  COMPARISON: None.  TECHNIQUE: Routine Lumbar Spine MRI without IV contrast.    FINDINGS:   Nomenclature is based on 5 lumbar type vertebral bodies. Satisfactory vertebral body height. Nothing for marrow infiltrative process. No marrow or ligamentous edema is identified. 1 mm to 2 mm retrolisthesis L4 upon L5. Mild interspace narrowing multiple   lumbar levels most notable L4-L5. No compression fractures or marrow/ligamentous edema is identified. Satisfactory upper sacral alignment with no presacral mass or fluid collection. Nothing to suggest sacral edema or insufficiency fracture upper sacral   level. Normal distal spinal cord and cauda equina with conus medullaris at L1. Satisfactory appearance of nerve roots of the cauda equina without nodularity thickening. No retroperitoneal solid mass or adenopathy. Psoas muscles are symmetric. Very mild   hypertrophic changes both SI joints and at L5-S1 facet joints.  Unremarkable visualized bony pelvis. Mild rightward lumbar curve.    T12-L1: No spinal canal or foraminal narrowing.    L1-L2: Mild loss of disc height with disc desiccation and annular bulge. This is asymmetric to the right. No herniation. Normal facets. No spinal canal or neural foraminal stenosis.     L2-L3: Mild loss of disc height. Generalized disc bulge asymmetric to the left. Focal right paracentral disc extrusion with inferior migration severely narrows the right L3 subarticular zone and may correlate to right L3 radiculopathy due to contact of   the traversing right L3 nerve root at this level. There is mild spinal stenosis evident and mild bilateral foraminal narrowing, left more than right. Facet joints are satisfactory.    L3-L4: Mild loss of disc height with annular bulge. This is asymmetric to the left. Mild left-sided foraminal compromise without spinal canal or right-sided foraminal narrowing. No herniation. Normal facets.    L4-L5: Moderate loss of disc height with generalized disc bulge. Shallow broad based central disc protrusion. Mild spinal canal narrowing. Mild bilateral foraminal compromise. Mild encroachment of the L5 subarticular zones due to facet joint and mild   hypertrophic changes and mild thickening of the ligamentum flavum.    L5-S1: Mild loss of disc height with annular bulge. No disc herniation or spinal canal/foraminal narrowing. Mild facet joint spondylosis bilaterally.      Impression    IMPRESSION:  1.  Focal right paracentral disc extrusion L2-L3 with inferior migration severely narrows the right L3 subarticular recess and may correlate to right L3 radiculopathy.    2.  There is no evidence for severe spinal canal or foraminal narrowing at any lumbar level, with severe narrowing of the right L3 subarticular zone.    3.  Please see above for additional details and description.       If you have any questions or concerns, please call the clinic at the number listed above.        Sincerely,      Dirk Hayes MD        yajaira

## 2021-03-31 RX ORDER — DEXAMETHASONE 4 MG/1
4 TABLET ORAL 2 TIMES DAILY WITH MEALS
Qty: 10 TABLET | Refills: 0 | Status: SHIPPED | OUTPATIENT
Start: 2021-03-31 | End: 2021-05-06

## 2021-03-31 NOTE — RESULT ENCOUNTER NOTE
The following letter pertains to your most recent diagnostic tests:    The MRI confirms our suspicion of an L3 radiculopathy from a disc herniation.      As we discussed, typically the long term prognosis is good in these situations.    Physical therapy may help.  Call [phone] to schedule physical therapy sessions at any of the locations of the [facility] if you desire to do so.   A short course of oral dexamethasone is also an option to relieve symptoms if needed.  I sent an prescription for that medication to your pharmacy.  Inform me if symptoms fail to improve after an additional 3-4 weeks or inform me sooner if symptoms worsen or new symptom develop.      Sincerely,    Dr. Hayes

## 2021-04-21 NOTE — PROGRESS NOTES
[facility] Initial Evaluation    Subjective:  Alek Gimenez is a 61 year old male.    Patient s chief complaints: L3 disk herniation with pain now resolved    Condition occurred due to  spontaneous  Date of Onset: around 3/1/21  Location of symptoms is LBP and thigh to medial knee .  Symptoms other than pain include: numbness, tinging and weakness now resolved (after steroid treatment)   Quality of pain is was aching/sharp and frequency is intermittent, now doing better.    Pain dependence on time of day is: not dependent.   Pain rating is: 0/10.    Symptoms are exacerbated by: lifting, when symptoms were present stairs were difficult and pain was fairly constant with sitting, standing, walking.    Symptoms are relieved by:  Steroid treatment helped.    Progression of symptoms is that symptoms are:  Symptoms resolving.    Imaging/Special tests include: MRI: 3/30/21  T12-L1: No spinal canal or foraminal narrowing.   L1-L2: Mild loss of disc height with disc desiccation and annular bulge. This is asymmetric to the right. No herniation. Normal facets. No spinal canal or neural foraminal stenosis.    L2-L3: Mild loss of disc height. Generalized disc bulge asymmetric to the left. Focal right paracentral disc extrusion with inferior migration severely narrows the right L3 subarticular zone and may correlate to right L3 radiculopathy due to contact of the traversing right L3 nerve root at this level. There is mild spinal stenosis evident and mild bilateral foraminal narrowing, left more than right. Facet joints are satisfactory.   L3-L4: Mild loss of disc height with annular bulge. This is asymmetric to the left. Mild left-sided foraminal compromise without spinal canal or right-sided foraminal narrowing. No herniation. Normal facets.   L4-L5: Moderate loss of disc height with generalized disc bulge. Shallow broad based central disc protrusion. Mild spinal canal narrowing. Mild bilateral foraminal  compromise. Mild encroachment of the L5 subarticular zones due to facet joint and mild  hypertrophic changes and mild thickening of the ligamentum flavum.   L5-S1: Mild loss of disc height with annular bulge. No disc herniation or spinal canal/foraminal narrowing. Mild facet joint spondylosis bilaterally.                                                     IMPRESSION:  1.  Focal right paracentral disc extrusion L2-L3 with inferior migration severely narrows the right L3 subarticular recess and may correlate to right L3 radiculopathy.  2.  There is no evidence for severe spinal canal or foraminal narrowing at any lumbar level, with severe narrowing of the right L3 subarticular zone.  3.  Please see above for additional details and description.    Previous treatments include: steroids   Patient reports that general health is: excellent     Pertinent medical history includes:    Past Medical History:   Diagnosis Date     Hyperlipidemia, unspecified hyperlipidemia type 12/16/2019     Medical allergies includes:   Patient has no known allergies.  Surgical history includes:   Past Surgical History:   Procedure Laterality Date     ESOPHAGOSCOPY, GASTROSCOPY, DUODENOSCOPY (EGD), COMBINED N/A 12/29/2019    Procedure: Esophagogastroduodenoscopy, With Biopsy;  Surgeon: Дмитрий Peña MD;  Location: [facility]     Current medications include:    Current Outpatient Medications:      albuterol (PROAIR HFA/PROVENTIL HFA/VENTOLIN HFA) 108 (90 Base) MCG/ACT inhaler, Inhale 2 puffs into the lungs every 4 hours as needed for shortness of breath / dyspnea or wheezing, Disp: 1 Inhaler, Rfl: 3     budesonide-formoterol (SYMBICORT) 80-4.5 MCG/ACT Inhaler, Inhale 2 puffs into the lungs 2 times daily, Disp: 6.9 g, Rfl: 0     dexamethasone (DECADRON) 4 MG tablet, Take 1 tablet (4 mg) by mouth 2 times daily (with meals), Disp: 10 tablet, Rfl: 0     pantoprazole (PROTONIX) 40 MG EC tablet, Take 1 tablet (40 mg) by mouth 2 times daily,  Disp: 60 tablet, Rfl: 1     sildenafil (VIAGRA) 100 MG tablet, 0.5-1 tablets (50mg-100 mg) by mouth daily prn for erectile dysfunction prior to intercourse. Avoid nitroglycerin, terazosin or doxazosin. Follow-up with Dr. 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    LUMBAR:    Posture: fair  Posture Correction: trace discomfort on slouch, none on arch  Relevant Lateral Shift: no    Neurological:    Motor Deficit:  Myotomes L R   L1-2 (hip flexion) 5 5   L3 (knee extension) 5 5   L4 (ankle DF) 5 5   L5 (g. toe ext) 5 5   S1 (ankle PF or knee flex) 5 5     Sensory Deficit, Reflexes: intact light touch sensation on LE dermatome screen    Dural Signs:   L R   Slump negative negative   SLR Negative, tight HS Negative, tight HS       AROM: (Major, Moderate, Minimal or Nil loss)  Movement Loss Ivan Mod Min Nil Pain   Flexion   X  To distal 1/3 of tibia without pain   Extension    X No effect   Side Gliding L    X No effect   Side Gliding R    X No effect     Repeated movement testing:   (During: produces, abolishes, increases, decreases, no effect, centralizing, peripheralizing; After: better, worse, no better, no worse, no effect, centralized, peripheralized)    Pre-test Symptoms Standing: none   Symptoms During Symptoms After ROM increased ROM decreased No Effect   FIS        Rep FIS No effect No effect   X   EIS        Rep EIS No effect No effect   X   Pre-test Symptoms Lying: none    Symptoms During Symptoms After ROM increased ROM decreased No Effect   AMANDA        Rep AMANDA No effect No effect    X   EIL        Rep EIL No effect No effect   X     Other Tests: hip flexion 110 B and hip IR/ER 45/45 each B, WNL without pain with hip PROM    Provisional Classification: prior history of likely lumbar derangement with disc herniation, now testing negative on exam, without reproduction of symptoms (improved with steroids)  Principle of Management: initiate recovery of function.  Gave HEP for lumbar ROM, recovery of function, core strength and posture.     Assessment/Plan:    Patient is a 61 year old male with lumbar complaints.    Patient has the following significant findings with corresponding treatment plan.                Diagnosis 1:  LBP with R L3 radiculopathy    Pain -  hot/cold therapy, US, electric stimulation, manual therapy and directional preference exercise  Decreased ROM/flexibility - manual therapy and therapeutic exercise  Decreased strength - therapeutic exercise and therapeutic activities  Impaired balance - neuro re-education and therapeutic activities  Decreased proprioception - neuro re-education and therapeutic activities  Edema - electric stimulation and cold therapy  Impaired gait - gait training  Decreased function - therapeutic activities  Impaired posture - neuro re-education    Therapy Evaluation Codes:   1) History comprised of:   Personal factors that impact the plan of care:      None.    Comorbidity factors that impact the plan of care are:      None.     Medications impacting care: None.  2) Examination of Body Systems comprised of:   Body structures and functions that impact the plan of care:      Lumbar spine.   Activity limitations that impact the plan of care are:      Lifting, Sitting, Stairs and Walking.  3) Clinical presentation characteristics are:   Stable/Uncomplicated.  4) Decision-Making    Low complexity using standardized patient assessment instrument and/or measureable assessment of functional outcome.  Cumulative Therapy Evaluation is: Low complexity.    Previous  and current functional limitations:  (See Goal Flow Sheet for this information)    Short term and Long term goals: (See Goal Flow Sheet for this information)     Communication ability:  Patient appears to be able to clearly communicate and understand verbal and written communication and follow directions correctly.  Treatment Explanation - The following has been discussed with the patient:   RX ordered/plan of care  Anticipated outcomes  Possible risks and side effects  This patient would benefit from PT intervention to resume normal activities.   Rehab potential is good.    Frequency:  1 X week, once daily  Duration:  for 1 weeks (after steroids symptoms are resolved.  Implemented HEP, will be 1 visit only and utilize his HEP, unless symptoms recur, in which case he will call PT)  Discharge Plan:  Achieve all LTG.  Independent in home treatment program.  Reach maximal therapeutic benefit.    Please refer to the daily flowsheet for treatment today, total treatment time and time spent performing 1:1 timed codes.

## 2021-04-22 ENCOUNTER — THERAPY VISIT (OUTPATIENT)
Dept: PHYSICAL THERAPY | Facility: CLINIC | Age: 61
End: 2021-04-22
Attending: INTERNAL MEDICINE
Payer: COMMERCIAL

## 2021-04-22 DIAGNOSIS — M51.26 LUMBAR DISC HERNIATION: ICD-10-CM

## 2021-04-22 DIAGNOSIS — M54.41 ACUTE BILATERAL LOW BACK PAIN WITH RIGHT-SIDED SCIATICA: ICD-10-CM

## 2021-04-22 PROCEDURE — 97161 PT EVAL LOW COMPLEX 20 MIN: CPT | Mod: GP | Performed by: PHYSICAL THERAPIST

## 2021-04-22 PROCEDURE — 97110 THERAPEUTIC EXERCISES: CPT | Mod: GP | Performed by: PHYSICAL THERAPIST

## 2021-04-24 PROBLEM — M54.41 ACUTE BILATERAL LOW BACK PAIN WITH RIGHT-SIDED SCIATICA: Status: RESOLVED | Noted: 2021-04-24 | Resolved: 2021-04-24

## 2021-04-24 PROBLEM — M54.41 ACUTE BILATERAL LOW BACK PAIN WITH RIGHT-SIDED SCIATICA: Status: ACTIVE | Noted: 2021-04-24

## 2021-04-24 NOTE — PROGRESS NOTES
Patient only needed 1 physical therapy visit.  Please see initial evaluation from 4/22/21 for discharge status.

## 2021-05-06 ENCOUNTER — OFFICE VISIT (OUTPATIENT)
Dept: FAMILY MEDICINE | Facility: CLINIC | Age: 61
End: 2021-05-06
Payer: COMMERCIAL

## 2021-05-06 VITALS
HEART RATE: 67 BPM | SYSTOLIC BLOOD PRESSURE: 118 MMHG | WEIGHT: 214 LBS | DIASTOLIC BLOOD PRESSURE: 80 MMHG | HEIGHT: 72 IN | OXYGEN SATURATION: 98 % | BODY MASS INDEX: 28.99 KG/M2 | TEMPERATURE: 99.3 F

## 2021-05-06 DIAGNOSIS — Z12.5 SCREENING FOR PROSTATE CANCER: ICD-10-CM

## 2021-05-06 DIAGNOSIS — E78.5 HYPERLIPIDEMIA LDL GOAL <100: ICD-10-CM

## 2021-05-06 DIAGNOSIS — Z00.00 ROUTINE GENERAL MEDICAL EXAMINATION AT A HEALTH CARE FACILITY: Primary | ICD-10-CM

## 2021-05-06 PROBLEM — K92.2 UPPER GI BLEEDING: Status: RESOLVED | Noted: 2019-12-29 | Resolved: 2021-05-06

## 2021-05-06 PROCEDURE — 99396 PREV VISIT EST AGE 40-64: CPT | Performed by: INTERNAL MEDICINE

## 2021-05-06 RX ORDER — SIMVASTATIN 40 MG
40 TABLET ORAL AT BEDTIME
Qty: 90 TABLET | Refills: 3 | Status: SHIPPED | OUTPATIENT
Start: 2021-05-06 | End: 2022-05-26

## 2021-05-06 ASSESSMENT — MIFFLIN-ST. JEOR: SCORE: 1813.7

## 2021-05-06 NOTE — PROGRESS NOTES
3  SUBJECTIVE:   CC: Alek Gimenez is an 61 year old male who presents for preventive health visit.       Patient has been advised of split billing requirements and indicates understanding: Yes  Healthy Habits:    Do you get at least three servings of calcium containing foods daily (dairy, green leafy vegetables, etc.)? yes    Amount of exercise or daily activities, outside of work: back exercises    Problems taking medications regularly No    Medication side effects: No    Have you had an eye exam in the past two years? yes    Do you see a dentist twice per year? unknown  Do you have sleep apnea, excessive snoring or daytime drowsiness?    Back and leg symptoms resolved with oral steroid     Today's PHQ-2 Score:   PHQ-2 ( 1999 [employer]) 5/6/2021 3/25/2021   Q1: Little interest or pleasure in doing things 0 0   Q2: Feeling down, depressed or hopeless 0 0   PHQ-2 Score 0 0   Q1: Little interest or pleasure in doing things - -   Q2: Feeling down, depressed or hopeless - -   PHQ-2 Score - -           Social History     Tobacco Use     Smoking status: Never Smoker     Smokeless tobacco: Never Used   Substance Use Topics     Alcohol use: Yes     Comment: 1 drink per month     If you drink alcohol do you typically have >3 drinks per day or >7 drinks per week?                       Last PSA:   PSA   Date Value Ref Range Status   12/16/2019 0.94 0 - 4 ug/L Final     Comment:     Assay Method:  Chemiluminescence using Siemens Vista analyzer       Reviewed orders with patient. Reviewed health maintenance and updated orders accordingly - Yes  Patient Active Problem List   Diagnosis     Hyperlipidemia, unspecified hyperlipidemia type     Past Surgical History:   Procedure Laterality Date     ESOPHAGOSCOPY, GASTROSCOPY, DUODENOSCOPY (EGD), COMBINED N/A 12/29/2019    Procedure: Esophagogastroduodenoscopy, With Biopsy;  Surgeon: Дмитрий Peña MD;  Location: [facility]       Social History     Tobacco Use      Smoking status: Never Smoker     Smokeless tobacco: Never Used   Substance Use Topics     Alcohol use: Yes     Comment: 1 drink per month     No family history on file.      Current Outpatient Medications   Medication Sig Dispense Refill     albuterol (PROAIR HFA/PROVENTIL HFA/VENTOLIN HFA) 108 (90 Base) MCG/ACT inhaler Inhale 2 puffs into the lungs every 4 hours as needed for shortness of breath / dyspnea or wheezing 1 Inhaler 3     sildenafil (VIAGRA) 100 MG tablet 0.5-1 tablets (50mg-100 mg) by mouth daily prn for erectile dysfunction prior to intercourse. Avoid nitroglycerin, terazosin or doxazosin. Follow-up with Dr. Agosto in summer for your fasting full examination 30 tablet 1     simvastatin (ZOCOR) 40 MG tablet Take 1 tablet (40 mg) by mouth At Bedtime 90 tablet 3     No Known Allergies    Reviewed and updated as needed this visit by clinical staff  Tobacco  Allergies  Meds              Reviewed and updated as needed this visit by Provider                Past Medical History:   Diagnosis Date     Hyperlipidemia, unspecified hyperlipidemia type 12/16/2019     Upper GI bleeding 12/29/2019      Past Surgical History:   Procedure Laterality Date     ESOPHAGOSCOPY, GASTROSCOPY, DUODENOSCOPY (EGD), COMBINED N/A 12/29/2019    Procedure: Esophagogastroduodenoscopy, With Biopsy;  Surgeon: Дмитрий Peña MD;  Location:  GI       ROS:  A 10 organ systems ROS is negative other than any pertinent positives or negatives previously stated.     OBJECTIVE:   /80 (BP Location: Right arm, Cuff Size: Adult Large)   Pulse 67   Temp 99.3  F (37.4  C) (Temporal)   Ht 1.829 m (6')   Wt 97.1 kg (214 lb)   SpO2 98%   BMI 29.02 kg/m    EXAM:  GENERAL: healthy, alert and no distress  EYES: Eyes grossly normal to inspection, PERRL and conjunctivae and sclerae normal  HENT: ear canals and TM's normal  NECK: no adenopathy, no asymmetry, masses, or scars and thyroid normal to palpation  RESP: lungs clear to  auscultation - no rales, rhonchi or wheezes  CV: regular rate and rhythm, normal S1 S2, no S3 or S4, no murmur, click or rub, no peripheral edema and peripheral pulses strong  ABDOMEN: soft, nontender, no hepatosplenomegaly, no masses and bowel sounds normal  RECTAL: normal sphincter tone, no rectal masses, prostate normal size, smooth, nontender without nodules or masses  MS: no gross musculoskeletal defects noted, no edema  SKIN: no suspicious lesions or rashes  NEURO: Normal strength and tone, mentation intact and speech normal  PSYCH: mentation appears normal, affect normal/bright    Labs pending     ASSESSMENT/PLAN:   1. Routine general medical examination at a health care facility      2. Hyperlipidemia LDL goal <100    - Comprehensive metabolic panel; Future  - CBC with platelets; Future  - Lipid panel reflex to direct LDL Fasting; Future  - simvastatin (ZOCOR) 40 MG tablet; Take 1 tablet (40 mg) by mouth At Bedtime  Dispense: 90 tablet; Refill: 3    3. Screening for prostate cancer  - Prostate spec antigen screen; Future    Patient has been advised of split billing requirements and indicates understanding: Yes  COUNSELING:  Reviewed preventive health counseling, as reflected in patient instructions  Special attention given to:        Regular exercise       Healthy diet/nutrition       Immunizations    Vaccines are up to date              Consider Hep C screening for all patients one time for ages 18-79 years       HIV screeninx in teen years, 1x in adult years, and at intervals if high risk       Colon cancer screening; get 2017 report from [facility] if possible       Prostate cancer screening; PSA today        Consider lung cancer screening for ages 55-80 years and 30 pack-year smoking history  ; never smoker     Estimated body mass index is 29.02 kg/m  as calculated from the following:    Height as of this encounter: 1.829 m (6').    Weight as of this encounter: 97.1 kg (214 lb).    Weight management plan:  Discussed healthy diet and exercise guidelines    He reports that he has never smoked. He has never used smokeless tobacco.      Counseling Resources:  ATP IV Guidelines  Pooled Cohorts Equation Calculator  FRAX Risk Assessment  ICSI Preventive Guidelines  Dietary Guidelines for Americans, 2010  USDA's MyPlate  ASA Prophylaxis  Lung CA Screening    Dirk Hayes MD  [facility]

## 2021-05-19 ENCOUNTER — HOSPITAL ENCOUNTER (OUTPATIENT)
Dept: LAB | Facility: CLINIC | Age: 61
Discharge: HOME | End: 2021-05-19
Attending: INTERNAL MEDICINE | Admitting: INTERNAL MEDICINE
Payer: COMMERCIAL

## 2021-05-19 DIAGNOSIS — Z12.5 SCREENING FOR PROSTATE CANCER: ICD-10-CM

## 2021-05-19 DIAGNOSIS — E78.5 HYPERLIPIDEMIA LDL GOAL <100: ICD-10-CM

## 2021-05-19 LAB
ALBUMIN SERPL-MCNC: 3.8 G/DL (ref 3.4–5)
ALP SERPL-CCNC: 109 U/L (ref 40–150)
ALT SERPL W P-5'-P-CCNC: 32 U/L (ref 0–70)
ANION GAP SERPL CALCULATED.3IONS-SCNC: 6 MMOL/L (ref 3–14)
AST SERPL W P-5'-P-CCNC: 14 U/L (ref 0–45)
BILIRUB SERPL-MCNC: 0.7 MG/DL (ref 0.2–1.3)
BUN SERPL-MCNC: 17 MG/DL (ref 7–30)
CALCIUM SERPL-MCNC: 8.9 MG/DL (ref 8.5–10.1)
CHLORIDE SERPL-SCNC: 105 MMOL/L (ref 94–109)
CHOLEST SERPL-MCNC: 192 MG/DL
CO2 SERPL-SCNC: 27 MMOL/L (ref 20–32)
CREAT SERPL-MCNC: 1.01 MG/DL (ref 0.66–1.25)
ERYTHROCYTE [DISTWIDTH] IN BLOOD BY AUTOMATED COUNT: 12.6 % (ref 10–15)
GFR SERPL CREATININE-BSD FRML MDRD: 80 ML/MIN/{1.73_M2}
GLUCOSE SERPL-MCNC: 94 MG/DL (ref 70–99)
HCT VFR BLD AUTO: 43.3 % (ref 40–53)
HDLC SERPL-MCNC: 49 MG/DL
HGB BLD-MCNC: 14.7 G/DL (ref 13.3–17.7)
LDLC SERPL CALC-MCNC: 118 MG/DL
MCH RBC QN AUTO: 30.4 PG (ref 26.5–33)
MCHC RBC AUTO-ENTMCNC: 33.9 G/DL (ref 31.5–36.5)
MCV RBC AUTO: 90 FL (ref 78–100)
NONHDLC SERPL-MCNC: 143 MG/DL
PLATELET # BLD AUTO: 211 10E9/L (ref 150–450)
POTASSIUM SERPL-SCNC: 4.2 MMOL/L (ref 3.4–5.3)
PROT SERPL-MCNC: 7.8 G/DL (ref 6.8–8.8)
PSA SERPL-ACNC: 1.83 UG/L (ref 0–4)
RBC # BLD AUTO: 4.84 10E12/L (ref 4.4–5.9)
SODIUM SERPL-SCNC: 138 MMOL/L (ref 133–144)
TRIGL SERPL-MCNC: 123 MG/DL
WBC # BLD AUTO: 8.2 10E9/L (ref 4–11)

## 2021-05-19 PROCEDURE — 80053 COMPREHEN METABOLIC PANEL: CPT | Performed by: INTERNAL MEDICINE

## 2021-05-19 PROCEDURE — 80061 LIPID PANEL: CPT | Performed by: INTERNAL MEDICINE

## 2021-05-19 PROCEDURE — G0103 PSA SCREENING: HCPCS | Performed by: INTERNAL MEDICINE

## 2021-05-19 PROCEDURE — 85027 COMPLETE CBC AUTOMATED: CPT | Performed by: INTERNAL MEDICINE

## 2021-05-19 NOTE — LETTER
May 21, 2021      Alek DEMETRIUS Gimenez  [address] [place] [address]        Dear ,    The following letter pertains to your most recent diagnostic tests:     -Your prostate specific antigen (PSA) test result returned normal, but it is higher than last check.     -Liver and gallbladder tests are normal for you. (ALT,AST, Alk phos, bilirubin), kidney function is normal for you (Creatinine, GFR), Sodium is normal, Potassium is normal for you, Calcium is normal for you, Glucose (blood sugar) is normal for you.       -The cholesterol is pretty much stable.       -Your complete blood counts including your hemoglobin returned normal for you.         Bottom line: Overall, I think these results look pretty good.  The PSA has doubled since last check, but remains in the middle of the normal range.  To establish a trend, I think it might be worthwhile rechecking in about 3 months.  This might be overkill, but if the trend is towards ongoing elevation of the PSA, it might be clinically significant.  Since you are tolerating the 40 mg dose of simvastatin well, and the LDL is near enough to goal, I think that it is reasonable to continue your current dose of simvastatin.  That being said, if you wanted to go for perfection, we could switch to atorvastatin to try to achieve an LDL less than 100.  Sometimes perfection is the enemy of good enough.  Please let me know if you want to make that change.  I think it is optional if you want to be particularly aggressive about prevention.         Follow up: Schedule a lab appointment in 3 months to recheck the PSA.  Let me know if you want to switch the simvastatin to atorvastatin to get a little bit better control of the LDL.  However, it is perfectly reasonable to stick with your current dose of simvastatin as well.     Resulted Orders   Prostate spec antigen screen   Result Value Ref Range    PSA 1.83 0 - 4 ug/L      Comment:      Assay Method:  Chemiluminescence using Siemens  [place] analyzer   Comprehensive metabolic panel   Result Value Ref Range    Sodium 138 133 - 144 mmol/L    Potassium 4.2 3.4 - 5.3 mmol/L    Chloride 105 94 - 109 mmol/L    Carbon Dioxide 27 20 - 32 mmol/L    Anion Gap 6 3 - 14 mmol/L    Glucose 94 70 - 99 mg/dL    Urea Nitrogen 17 7 - 30 mg/dL    Creatinine 1.01 0.66 - 1.25 mg/dL    GFR Estimate 80 >60 mL/min/[1.73_m2]      Comment:      Non  GFR Calc  Starting 12/18/2018, serum creatinine based estimated GFR (eGFR) will be   calculated using the Chronic Kidney Disease Epidemiology Collaboration   (CKD-EPI) equation.      GFR Estimate If Black >90 >60 mL/min/[1.73_m2]      Comment:       GFR Calc  Starting 12/18/2018, serum creatinine based estimated GFR (eGFR) will be   calculated using the Chronic Kidney Disease Epidemiology Collaboration   (CKD-EPI) equation.      Calcium 8.9 8.5 - 10.1 mg/dL    Bilirubin Total 0.7 0.2 - 1.3 mg/dL    Albumin 3.8 3.4 - 5.0 g/dL    Protein Total 7.8 6.8 - 8.8 g/dL    Alkaline Phosphatase 109 40 - 150 U/L    ALT 32 0 - 70 U/L    AST 14 0 - 45 U/L   CBC with platelets   Result Value Ref Range    WBC 8.2 4.0 - 11.0 10e9/L    RBC Count 4.84 4.4 - 5.9 10e12/L    Hemoglobin 14.7 13.3 - 17.7 g/dL    Hematocrit 43.3 40.0 - 53.0 %    MCV 90 78 - 100 fl    MCH 30.4 26.5 - 33.0 pg    MCHC 33.9 31.5 - 36.5 g/dL    RDW 12.6 10.0 - 15.0 %    Platelet Count 211 150 - 450 10e9/L   Lipid panel reflex to direct LDL Fasting   Result Value Ref Range    Cholesterol 192 <200 mg/dL    Triglycerides 123 <150 mg/dL    HDL Cholesterol 49 >39 mg/dL    LDL Cholesterol Calculated 118 (H) <100 mg/dL      Comment:      Above desirable:  100-129 mg/dl  Borderline High:  130-159 mg/dL  High:             160-189 mg/dL  Very high:       >189 mg/dl      Non HDL Cholesterol 143 (H) <130 mg/dL      Comment:      Above Desirable:  130-159 mg/dl  Borderline high:  160-189 mg/dl  High:             190-219 mg/dl  Very high:       >219  mg/dl         If you have any questions or concerns, please call the clinic at the number listed above.       Sincerely,      Dirk Hayes MD        [facility]

## 2021-05-21 NOTE — RESULT ENCOUNTER NOTE
The following letter pertains to your most recent diagnostic tests:    -Your prostate specific antigen (PSA) test result returned normal, but it is higher than last check.    -Liver and gallbladder tests are normal for you. (ALT,AST, Alk phos, bilirubin), kidney function is normal for you (Creatinine, GFR), Sodium is normal, Potassium is normal for you, Calcium is normal for you, Glucose (blood sugar) is normal for you.      -The cholesterol is pretty much stable.      -Your complete blood counts including your hemoglobin returned normal for you.         Bottom line: Overall, I think these results look pretty good.  The PSA has doubled since last check, but remains in the middle of the normal range.  To establish a trend, I think it might be worthwhile rechecking in about 3 months.  This might be overkill, but if the trend is towards ongoing elevation of the PSA, it might be clinically significant.  Since you are tolerating the 40 mg dose of simvastatin well, and the LDL is near enough to goal, I think that it is reasonable to continue your current dose of simvastatin.  That being said, if you wanted to go for perfection, we could switch to atorvastatin to try to achieve an LDL less than 100.  Sometimes perfection is the enemy of good enough.  Please let me know if you want to make that change.  I think it is optional if you want to be particularly aggressive about prevention.        Follow up: Schedule a lab appointment in 3 months to recheck the PSA.  Let me know if you want to switch the simvastatin to atorvastatin to get a little bit better control of the LDL.  However, it is perfectly reasonable to stick with your current dose of simvastatin as well.      Sincerely,    Dr. Hayes

## 2021-08-26 PROBLEM — M54.16 LUMBAR RADICULOPATHY: Status: ACTIVE | Noted: 2021-03-11

## 2021-08-26 NOTE — PROGRESS NOTES
HPI:  Patient complains of new flashes in the left eye for 2-3 days ago. He mentioned lightening flashes that lasts for seconds. He also sees what looks like hair on the side intermittent. He has floaters in both eyes since 4-5 months ago.       Pertinent Medical History:    Hyperlipidemia    Ocular History:    None    Eye Medications:    None    Assessment and Plan:  1.   Posterior Vitreous Detachment, both eyes. Retinas attached.     Educated on signs and symptoms of a retinal detachment (ie. Hundreds of floaters, flashes of light, and shadow/curtain over the vision) to be seen immediately.    New symptoms of flashes in the left eye. Repeat dilation both eyes in 1 month.     2.   Cataract, both eyes.     Mildly visually significant. Patient is not bothered. Monitor for now.    Recommend UV protection.     Patient consented to a dilated eye exam:    Yes. Side effects discussed.    Medical History:  Past Medical History:   Diagnosis Date     Hyperlipidemia, unspecified hyperlipidemia type 12/16/2019     Upper GI bleeding 12/29/2019       Medications:  Current Outpatient Medications   Medication Sig Dispense Refill     albuterol (PROAIR HFA/PROVENTIL HFA/VENTOLIN HFA) 108 (90 Base) MCG/ACT inhaler Inhale 2 puffs into the lungs every 4 hours as needed for shortness of breath / dyspnea or wheezing 1 Inhaler 3     sildenafil (VIAGRA) 100 MG tablet 0.5-1 tablets (50mg-100 mg) by mouth daily prn for erectile dysfunction prior to intercourse. Avoid nitroglycerin, terazosin or doxazosin. Follow-up with Dr. Agosto in summer for your fasting full examination 30 tablet 1     simvastatin (ZOCOR) 40 MG tablet Take 1 tablet (40 mg) by mouth At Bedtime 90 tablet 3   Complete documentation of historical and exam elements from today's encounter can be found in the full encounter summary report (not reduplicated in this progress note). I personally obtained the chief complaint(s) and history of present illness.  I confirmed and  edited as necessary the review of systems, past medical/surgical history, family history, social history, and examination findings as documented by others; and I examined the patient myself. I personally reviewed the relevant tests, images, and reports as documented above. I formulated and edited as necessary the assessment and plan and discussed the findings and management plan with the patient and family. - Fredrick Gallo, PLACIDO

## 2021-08-27 ENCOUNTER — OFFICE VISIT (OUTPATIENT)
Dept: OPHTHALMOLOGY | Facility: CLINIC | Age: 61
End: 2021-08-27
Attending: OPTOMETRIST
Payer: COMMERCIAL

## 2021-08-27 DIAGNOSIS — H43.813 VITREOUS DETACHMENT OF BOTH EYES: Primary | ICD-10-CM

## 2021-08-27 DIAGNOSIS — H25.13 NUCLEAR SENILE CATARACT OF BOTH EYES: ICD-10-CM

## 2021-08-27 PROCEDURE — G0463 HOSPITAL OUTPT CLINIC VISIT: HCPCS

## 2021-08-27 PROCEDURE — 99203 OFFICE O/P NEW LOW 30 MIN: CPT | Performed by: OPTOMETRIST

## 2021-08-27 ASSESSMENT — CONF VISUAL FIELD
OS_NORMAL: 1
METHOD: COUNTING FINGERS
OD_NORMAL: 1

## 2021-08-27 ASSESSMENT — REFRACTION_WEARINGRX
SPECS_TYPE: PAL
OD_CYLINDER: +0.25
OS_ADD: +2.25
OS_AXIS: 156
OS_CYLINDER: +1.00
OS_SPHERE: -5.50
OD_SPHERE: -4.00
OD_ADD: +2.25
OD_AXIS: 010

## 2021-08-27 ASSESSMENT — VISUAL ACUITY
OS_PH_CC: 20/20
OD_CC: 20/25
CORRECTION_TYPE: GLASSES
OS_CC: 20/30
OS_PH_CC+: -2
METHOD: SNELLEN - LINEAR
OS_CC+: -1

## 2021-08-27 ASSESSMENT — SLIT LAMP EXAM - LIDS
COMMENTS: BLEPHARITIS
COMMENTS: BLEPHARITIS

## 2021-08-27 ASSESSMENT — TONOMETRY
IOP_METHOD: ICARE
OS_IOP_MMHG: 10
OD_IOP_MMHG: 16

## 2021-08-27 ASSESSMENT — EXTERNAL EXAM - RIGHT EYE: OD_EXAM: NORMAL

## 2021-08-27 ASSESSMENT — EXTERNAL EXAM - LEFT EYE: OS_EXAM: NORMAL

## 2021-08-27 NOTE — NURSING NOTE
Chief Complaints and History of Present Illnesses   Patient presents with     Flashes Evaluation     Chief Complaint(s) and History of Present Illness(es)     Flashes Evaluation     Laterality: left eye    Quality: lightning bolts    Duration: days    Characteristics: intermittent    Associated symptoms: floaters.  Negative for shade, headaches, blurred vision and eye pain    Pain scale: 0/10              Comments     Patient states noticing intermittent flashing on the left side periphery for the last 2-3 days. Denies previous episodes of flashes. Patient states noticing floaters each eye for months. Denies veils or curtains each eye. Patient states vision is good each eye with glasses. Denies headaches. Denies eye pain.     FADUMO Levy 8:30 AM 08/27/2021

## 2021-09-11 ENCOUNTER — HEALTH MAINTENANCE LETTER (OUTPATIENT)
Age: 61
End: 2021-09-11

## 2021-09-21 NOTE — PROGRESS NOTES
HPI:  Patient presents for flashes follow up in the left eye. There are no more flashes. A few floaters. No shade over the vision.       Pertinent Medical History:    Hyperlipidemia    Ocular History:    PVD, both eyes    Cataract, both eyes.     Eye Medications:    None    Assessment and Plan:  1.   Posterior Vitreous Detachment, both eyes. Retinas attached.     Educated on signs and symptoms of a retinal detachment (ie. Hundreds of floaters, flashes of light, and shadow/curtain over the vision) to be seen immediately.    New symptoms of flashes in the left eye. Repeat dilation both eyes in 1 month.     2.   Cataract, both eyes.     Mildly visually significant. Patient is not bothered. Monitor for now.    Recommend UV protection.     Patient consented to a dilated eye exam:    Yes. Side effects discussed.    Medical History:  Past Medical History:   Diagnosis Date     Hyperlipidemia, unspecified hyperlipidemia type 12/16/2019     Upper GI bleeding 12/29/2019       Medications:  Current Outpatient Medications   Medication Sig Dispense Refill     albuterol (PROAIR HFA/PROVENTIL HFA/VENTOLIN HFA) 108 (90 Base) MCG/ACT inhaler Inhale 2 puffs into the lungs every 4 hours as needed for shortness of breath / dyspnea or wheezing 1 Inhaler 3     sildenafil (VIAGRA) 100 MG tablet 0.5-1 tablets (50mg-100 mg) by mouth daily prn for erectile dysfunction prior to intercourse. Avoid nitroglycerin, terazosin or doxazosin. Follow-up with Dr. Agosto in summer for your fasting full examination 30 tablet 1     simvastatin (ZOCOR) 40 MG tablet Take 1 tablet (40 mg) by mouth At Bedtime 90 tablet 3   Complete documentation of historical and exam elements from today's encounter can be found in the full encounter summary report (not reduplicated in this progress note). I personally obtained the chief complaint(s) and history of present illness.  I confirmed and edited as necessary the review of systems, past medical/surgical history,  family history, social history, and examination findings as documented by others; and I examined the patient myself. I personally reviewed the relevant tests, images, and reports as documented above. I formulated and edited as necessary the assessment and plan and discussed the findings and management plan with the patient and family. - Fredrick Gallo OD

## 2021-09-27 ENCOUNTER — OFFICE VISIT (OUTPATIENT)
Dept: OPHTHALMOLOGY | Facility: CLINIC | Age: 61
End: 2021-09-27
Attending: OPTOMETRIST
Payer: COMMERCIAL

## 2021-09-27 DIAGNOSIS — H43.813 VITREOUS DETACHMENT OF BOTH EYES: Primary | ICD-10-CM

## 2021-09-27 DIAGNOSIS — H25.13 NUCLEAR SENILE CATARACT OF BOTH EYES: ICD-10-CM

## 2021-09-27 PROCEDURE — 92014 COMPRE OPH EXAM EST PT 1/>: CPT | Performed by: OPTOMETRIST

## 2021-09-27 PROCEDURE — G0463 HOSPITAL OUTPT CLINIC VISIT: HCPCS

## 2021-09-27 ASSESSMENT — CONF VISUAL FIELD
METHOD: COUNTING FINGERS
OD_NORMAL: 1
OS_NORMAL: 1

## 2021-09-27 ASSESSMENT — VISUAL ACUITY
OD_PH_CC+: -2
OS_PH_CC: 20/25
OS_PH_CC+: -2
METHOD: SNELLEN - LINEAR
OS_CC+: -2
OD_PH_CC: 20/25
OD_CC+: -2
OS_CC: 20/30
OD_CC: 20/30

## 2021-09-27 ASSESSMENT — TONOMETRY
IOP_METHOD: TONOPEN
OD_IOP_MMHG: 18
OS_IOP_MMHG: 15

## 2021-09-27 ASSESSMENT — REFRACTION_WEARINGRX
OS_SPHERE: -5.50
OS_AXIS: 156
OS_ADD: +2.25
OD_SPHERE: -4.00
OD_CYLINDER: +0.25
SPECS_TYPE: PAL
OD_AXIS: 010
OS_CYLINDER: +1.00
OD_ADD: +2.25

## 2021-09-27 ASSESSMENT — EXTERNAL EXAM - RIGHT EYE: OD_EXAM: NORMAL

## 2021-09-27 ASSESSMENT — SLIT LAMP EXAM - LIDS
COMMENTS: BLEPHARITIS
COMMENTS: BLEPHARITIS

## 2021-09-27 ASSESSMENT — EXTERNAL EXAM - LEFT EYE: OS_EXAM: NORMAL

## 2021-09-27 NOTE — NURSING NOTE
Chief Complaints and History of Present Illnesses   Patient presents with     Posterior Vitreous Detachment Follow Up     Chief Complaint(s) and History of Present Illness(es)     Posterior Vitreous Detachment Follow Up     Laterality: both eyes    Characteristics: constant    Associated symptoms: floaters              Comments     Alek is here to continue care for Vitreous detachment of both eyes, He stares no vision change since last visit.     Austen Wright COT 9:06 AM September 27, 2021

## 2021-10-22 ENCOUNTER — LAB (OUTPATIENT)
Dept: LAB | Facility: CLINIC | Age: 61
End: 2021-10-22
Payer: COMMERCIAL

## 2021-10-22 DIAGNOSIS — Z12.5 SCREENING FOR PROSTATE CANCER: ICD-10-CM

## 2021-10-22 LAB — PSA SERPL-MCNC: 1.4 UG/L (ref 0–4)

## 2021-10-22 PROCEDURE — 36415 COLL VENOUS BLD VENIPUNCTURE: CPT

## 2021-10-22 PROCEDURE — G0103 PSA SCREENING: HCPCS

## 2021-10-22 NOTE — RESULT ENCOUNTER NOTE
Dr. Freddy Barkley is out of the office    I have had the opportunity to review your recent results and an interpretation is as follows:  Your PSA level is also stable indicating no evidence of prostate cancer      Sincerely,  Abhi Mcpherson MD

## 2021-10-26 ENCOUNTER — MYC MEDICAL ADVICE (OUTPATIENT)
Dept: FAMILY MEDICINE | Facility: CLINIC | Age: 61
End: 2021-10-26

## 2021-10-26 DIAGNOSIS — J45.20 MILD INTERMITTENT REACTIVE AIRWAY DISEASE WITHOUT COMPLICATION: ICD-10-CM

## 2021-10-27 RX ORDER — BUDESONIDE AND FORMOTEROL FUMARATE DIHYDRATE 80; 4.5 UG/1; UG/1
2 AEROSOL RESPIRATORY (INHALATION) 2 TIMES DAILY
Qty: 6.9 G | Refills: 0 | Status: SHIPPED | OUTPATIENT
Start: 2021-10-27 | End: 2021-12-01

## 2021-10-27 NOTE — TELEPHONE ENCOUNTER
PCP,   Please see patient's MyChart and reply back to patient or advise if triage follow up needed.    Routing refill request to provider for review/approval because:  Drug not active on patient's medication list    Last Written Prescription Date:  3-8-21  Last Fill Quantity: 6.9 g,  # refills: 0   Last office visit: 5/6/2021 with prescribing provider:  Dr Hayes   Future Office Visit:        Thank you,  Crystal DANIELS, RN      October 27, 2021  2:38 PM

## 2021-11-30 DIAGNOSIS — J45.20 MILD INTERMITTENT REACTIVE AIRWAY DISEASE WITHOUT COMPLICATION: ICD-10-CM

## 2021-12-01 ENCOUNTER — MYC MEDICAL ADVICE (OUTPATIENT)
Dept: FAMILY MEDICINE | Facility: CLINIC | Age: 61
End: 2021-12-01
Payer: COMMERCIAL

## 2021-12-01 RX ORDER — DILTIAZEM HYDROCHLORIDE 60 MG/1
TABLET, FILM COATED ORAL
Qty: 10.2 G | Refills: 1 | Status: SHIPPED | OUTPATIENT
Start: 2021-12-01 | End: 2022-06-08

## 2021-12-01 NOTE — TELEPHONE ENCOUNTER
Routing refill request to provider for review/approval because:  Drug not on the [facility] refill protocol .     ACT questionnaire sent to patient in my chart.     Alanna Sanders RN  [facility] Triage

## 2022-05-25 DIAGNOSIS — E78.5 HYPERLIPIDEMIA LDL GOAL <100: ICD-10-CM

## 2022-05-26 RX ORDER — SIMVASTATIN 40 MG
TABLET ORAL
Qty: 90 TABLET | Refills: 3 | Status: SHIPPED | OUTPATIENT
Start: 2022-05-26 | End: 2023-03-30

## 2022-05-26 NOTE — TELEPHONE ENCOUNTER
Routing refill request to provider for review/approval because:  Labs out of range/date   LDL Cholesterol Calculated   Date Value Ref Range Status   05/19/2021 118 (H) <100 mg/dL Final     Comment:     Above desirable:  100-129 mg/dl  Borderline High:  130-159 mg/dL  High:             160-189 mg/dL  Very high:       >189 mg/dl       Patient needs to be seen because it has been more than 1 year since last office visit.  Last visit 5/6/21. Sent MyChart to pt.     Laurel Venegas RN

## 2022-06-18 ENCOUNTER — HEALTH MAINTENANCE LETTER (OUTPATIENT)
Age: 62
End: 2022-06-18

## 2022-10-30 ENCOUNTER — HEALTH MAINTENANCE LETTER (OUTPATIENT)
Age: 62
End: 2022-10-30

## 2022-12-05 ENCOUNTER — MYC MEDICAL ADVICE (OUTPATIENT)
Dept: FAMILY MEDICINE | Facility: CLINIC | Age: 62
End: 2022-12-05

## 2022-12-05 DIAGNOSIS — J45.20 MILD INTERMITTENT REACTIVE AIRWAY DISEASE WITHOUT COMPLICATION: ICD-10-CM

## 2022-12-05 DIAGNOSIS — N52.9 ERECTILE DYSFUNCTION, UNSPECIFIED ERECTILE DYSFUNCTION TYPE: ICD-10-CM

## 2022-12-06 RX ORDER — SILDENAFIL 100 MG/1
TABLET, FILM COATED ORAL
Qty: 10 TABLET | Refills: 1 | Status: SHIPPED | OUTPATIENT
Start: 2022-12-06 | End: 2023-03-30

## 2022-12-06 RX ORDER — BUDESONIDE AND FORMOTEROL FUMARATE DIHYDRATE 80; 4.5 UG/1; UG/1
AEROSOL RESPIRATORY (INHALATION)
Qty: 10.2 G | Refills: 1 | Status: SHIPPED | OUTPATIENT
Start: 2022-12-06 | End: 2023-03-30

## 2022-12-06 NOTE — TELEPHONE ENCOUNTER
Appointments in Next Year    Mar 30, 2023  3:30 PM  (Arrive by 3:10 PM)  Provider Visit with Dirk Hayes MD  [facility] ([facility] ) [phone]        Patient stated he does not need #30 of sildenafil that #10 would be fine.  Pended as such.    Maggie Chen, RT (R)

## 2023-03-21 ENCOUNTER — TRANSFERRED RECORDS (OUTPATIENT)
Dept: HEALTH INFORMATION MANAGEMENT | Facility: CLINIC | Age: 63
End: 2023-03-21
Payer: COMMERCIAL

## 2023-03-30 ENCOUNTER — OFFICE VISIT (OUTPATIENT)
Dept: FAMILY MEDICINE | Facility: CLINIC | Age: 63
End: 2023-03-30
Payer: COMMERCIAL

## 2023-03-30 VITALS
RESPIRATION RATE: 18 BRPM | BODY MASS INDEX: 29.48 KG/M2 | HEART RATE: 75 BPM | WEIGHT: 217.4 LBS | OXYGEN SATURATION: 94 % | DIASTOLIC BLOOD PRESSURE: 83 MMHG | SYSTOLIC BLOOD PRESSURE: 128 MMHG | TEMPERATURE: 99.4 F

## 2023-03-30 DIAGNOSIS — N52.9 ERECTILE DYSFUNCTION, UNSPECIFIED ERECTILE DYSFUNCTION TYPE: ICD-10-CM

## 2023-03-30 DIAGNOSIS — E78.5 HYPERLIPIDEMIA LDL GOAL <100: ICD-10-CM

## 2023-03-30 DIAGNOSIS — J45.20 MILD INTERMITTENT REACTIVE AIRWAY DISEASE WITHOUT COMPLICATION: Primary | ICD-10-CM

## 2023-03-30 DIAGNOSIS — Z12.5 PROSTATE CANCER SCREENING: ICD-10-CM

## 2023-03-30 LAB
ERYTHROCYTE [DISTWIDTH] IN BLOOD BY AUTOMATED COUNT: 12.2 % (ref 10–15)
HCT VFR BLD AUTO: 46.2 % (ref 40–53)
HGB BLD-MCNC: 15.4 G/DL (ref 13.3–17.7)
MCH RBC QN AUTO: 30.1 PG (ref 26.5–33)
MCHC RBC AUTO-ENTMCNC: 33.3 G/DL (ref 31.5–36.5)
MCV RBC AUTO: 90 FL (ref 78–100)
PLATELET # BLD AUTO: 226 10E3/UL (ref 150–450)
RBC # BLD AUTO: 5.12 10E6/UL (ref 4.4–5.9)
WBC # BLD AUTO: 9.2 10E3/UL (ref 4–11)

## 2023-03-30 PROCEDURE — G0103 PSA SCREENING: HCPCS | Performed by: INTERNAL MEDICINE

## 2023-03-30 PROCEDURE — 85027 COMPLETE CBC AUTOMATED: CPT | Performed by: INTERNAL MEDICINE

## 2023-03-30 PROCEDURE — 80061 LIPID PANEL: CPT | Performed by: INTERNAL MEDICINE

## 2023-03-30 PROCEDURE — 99214 OFFICE O/P EST MOD 30 MIN: CPT | Performed by: INTERNAL MEDICINE

## 2023-03-30 PROCEDURE — 36415 COLL VENOUS BLD VENIPUNCTURE: CPT | Performed by: INTERNAL MEDICINE

## 2023-03-30 PROCEDURE — 80053 COMPREHEN METABOLIC PANEL: CPT | Performed by: INTERNAL MEDICINE

## 2023-03-30 RX ORDER — SILDENAFIL 100 MG/1
TABLET, FILM COATED ORAL
Qty: 10 TABLET | Refills: 1 | Status: SHIPPED | OUTPATIENT
Start: 2023-03-30 | End: 2023-09-14

## 2023-03-30 RX ORDER — BUDESONIDE AND FORMOTEROL FUMARATE DIHYDRATE 80; 4.5 UG/1; UG/1
AEROSOL RESPIRATORY (INHALATION)
Qty: 10.2 G | Refills: 1 | Status: SHIPPED | OUTPATIENT
Start: 2023-03-30 | End: 2023-08-29

## 2023-03-30 RX ORDER — ALBUTEROL SULFATE 90 UG/1
2 AEROSOL, METERED RESPIRATORY (INHALATION) EVERY 4 HOURS PRN
Qty: 18 G | Refills: 11 | Status: SHIPPED | OUTPATIENT
Start: 2023-03-30 | End: 2023-09-14

## 2023-03-30 RX ORDER — SIMVASTATIN 40 MG
40 TABLET ORAL AT BEDTIME
Qty: 90 TABLET | Refills: 3 | Status: SHIPPED | OUTPATIENT
Start: 2023-03-30 | End: 2024-04-19

## 2023-03-30 ASSESSMENT — ASTHMA QUESTIONNAIRES
QUESTION_5 LAST FOUR WEEKS HOW WOULD YOU RATE YOUR ASTHMA CONTROL: WELL CONTROLLED
QUESTION_4 LAST FOUR WEEKS HOW OFTEN HAVE YOU USED YOUR RESCUE INHALER OR NEBULIZER MEDICATION (SUCH AS ALBUTEROL): ONCE A WEEK OR LESS
QUESTION_1 LAST FOUR WEEKS HOW MUCH OF THE TIME DID YOUR ASTHMA KEEP YOU FROM GETTING AS MUCH DONE AT WORK, SCHOOL OR AT HOME: NONE OF THE TIME
QUESTION_2 LAST FOUR WEEKS HOW OFTEN HAVE YOU HAD SHORTNESS OF BREATH: NOT AT ALL
ACT_TOTALSCORE: 23
ACT_TOTALSCORE: 23
QUESTION_3 LAST FOUR WEEKS HOW OFTEN DID YOUR ASTHMA SYMPTOMS (WHEEZING, COUGHING, SHORTNESS OF BREATH, CHEST TIGHTNESS OR PAIN) WAKE YOU UP AT NIGHT OR EARLIER THAN USUAL IN THE MORNING: NOT AT ALL

## 2023-03-30 ASSESSMENT — PAIN SCALES - GENERAL: PAINLEVEL: NO PAIN (0)

## 2023-03-30 NOTE — PROGRESS NOTES
Assessment & Plan     Mild intermittent reactive airway disease without complication  On symbicort and albuterol  He is without symptoms  Scattered faint wheezes on exam, but he does not want to escalate therapy     Hyperlipidemia LDL goal <100  On statin therapy recheck labs   - Lipid panel reflex to direct LDL Fasting; Future  - Comprehensive metabolic panel; Future  - CBC with platelets; Future  - Lipid panel reflex to direct LDL Fasting  - Comprehensive metabolic panel  - CBC with platelets    Erectile dysfunction, unspecified erectile dysfunction type      Prostate cancer screening    - PROSTATE SPEC ANTIGEN SCREEN; Future  - PROSTATE SPEC ANTIGEN SCREEN      20 minutes spent by me on the date of the encounter doing chart review, history and exam, documentation and further activities per the note           Dirk Hayes MD  [facility] SHANEL Gaston is a 63 year old, presenting for the following health issues:  Refill Request  .  History of Present Illness       Hyperlipidemia:  He presents for follow up of hyperlipidemia.  He is taking medication to lower cholesterol. He is not having myalgia or other side effects to statin medications.    He eats 2-3 servings of fruits and vegetables daily.He consumes 1 sweetened beverage(s) daily.He exercises with enough effort to increase his heart rate 9 or less minutes per day.  He exercises with enough effort to increase his heart rate 3 or less days per week.   He is taking medications regularly.         Follow up asthma, hyperlipidemia, ED  Feels well no complaints      Review of Systems         Objective    /83   Pulse 75   Temp 99.4  F (37.4  C) (Oral)   Resp 18   Wt 98.6 kg (217 lb 6.4 oz)   SpO2 94%   BMI 29.48 kg/m    Body mass index is 29.48 kg/m .  Physical Exam   GENERAL: healthy, alert and no distress  NECK: no adenopathy, no asymmetry, masses, or scars and thyroid normal to palpation  RESP: scattered wheezing noted,  good air movement  CV: Heart with regular rate and rhythm.   ABDOMEN: soft, nontender, no hepatosplenomegaly, no masses and bowel sounds normal  MS: no gross musculoskeletal defects noted, no edema  NEURO: Normal strength and tone, mentation intact and speech normal  PSYCH: mentation appears normal, affect normal/bright

## 2023-03-31 LAB
ALBUMIN SERPL BCG-MCNC: 4.6 G/DL (ref 3.5–5.2)
ALP SERPL-CCNC: 115 U/L (ref 40–129)
ALT SERPL W P-5'-P-CCNC: 36 U/L (ref 10–50)
ANION GAP SERPL CALCULATED.3IONS-SCNC: 14 MMOL/L (ref 7–15)
AST SERPL W P-5'-P-CCNC: 24 U/L (ref 10–50)
BILIRUB SERPL-MCNC: 0.4 MG/DL
BUN SERPL-MCNC: 19.6 MG/DL (ref 8–23)
CALCIUM SERPL-MCNC: 9.4 MG/DL (ref 8.8–10.2)
CHLORIDE SERPL-SCNC: 103 MMOL/L (ref 98–107)
CHOLEST SERPL-MCNC: 197 MG/DL
CREAT SERPL-MCNC: 1.18 MG/DL (ref 0.67–1.17)
DEPRECATED HCO3 PLAS-SCNC: 24 MMOL/L (ref 22–29)
GFR SERPL CREATININE-BSD FRML MDRD: 69 ML/MIN/1.73M2
GLUCOSE SERPL-MCNC: 95 MG/DL (ref 70–99)
HDLC SERPL-MCNC: 41 MG/DL
LDLC SERPL CALC-MCNC: 92 MG/DL
NONHDLC SERPL-MCNC: 156 MG/DL
POTASSIUM SERPL-SCNC: 4.6 MMOL/L (ref 3.4–5.3)
PROT SERPL-MCNC: 7.6 G/DL (ref 6.4–8.3)
PSA SERPL DL<=0.01 NG/ML-MCNC: 1.19 NG/ML (ref 0–4.5)
SODIUM SERPL-SCNC: 141 MMOL/L (ref 136–145)
TRIGL SERPL-MCNC: 320 MG/DL

## 2023-03-31 NOTE — RESULT ENCOUNTER NOTE
"The following letter pertains to your most recent diagnostic tests:      -As we suspected, due to a nonfasting sample, the triglycerides are moderately elevated.  However, the other cholesterol parameters are at goal including the \"bad cholesterol\" LDL.  Please continue taking the simvastatin as you are.    -Liver and gallbladder tests are normal for you. (ALT,AST, Alk phos, bilirubin), kidney function is normal for you (Creatinine, GFR), Sodium is normal, Potassium is normal for you, Calcium is normal for you, Glucose (blood sugar) is normal for you.      -Your prostate specific antigen (PSA) test result returned normal.     -Your complete blood counts including your hemoglobin returned normal for you.       Bottom line:  Lab results look OK for you.        Follow up:  Schedule an appointment for a physical examination with fasting blood tests in one year's time, or return sooner if new questions, symptoms or problems arise.        Sincerely,    Dr. Hayes"

## 2023-04-28 NOTE — PROGRESS NOTES
HPI:  Patient presents for an annual eye exam. Patient states that [facility] placed prisms in his glasses. Patient was told at [facility] that if his vision does not improve with glasses, then it would be the cataract causing issues. Patient was seen at [facility] ~ 1 year ago. Patient complains of glares and haloes. There is difficulty driving at night. Patient has to increase the font size to see better. Diplopia resolves closing either eye.       Social history: [employer] [facility]. He goes to different hospitals.       Pertinent Medical History:    Hyperlipidemia    Intermittent reactive airway disease without complications.    Ocular History:    PVD, both eyes    Cataract, both eyes.     Prisms in glasses - prescribed by provider at [facility] 2023.     Eye Medications:    None    Assessment and Plan:  1.   Cataract, both eyes.     Macular OCT 05/08/2023: Both eyes: no cme.     Visually significant.     Recommend cataract evaluation with Dr. Fuentes.     2.   Posterior Vitreous Detachment, both eyes. Retinas attached.     Educated on signs and symptoms of a retinal detachment (ie. Hundreds of floaters, flashes of light, and shadow/curtain over the vision) to be seen immediately.    3.   Esotropia, left eye.     Patient noticed double since 1 year ago. He was seen at [facility] and given prisms. It is unclear what is causing the diplopia.     Appears to be noncomitant deviation today 05/08/2023. I also saw the patient back in 08/27/2021 with no evidence of esotropia on the left eye.     Recommend follow up with neuro-ophthalmology.     4.   Meibomian Gland Dysfunction, both eyes.     Also contributing to intermittent blurry vision both eyes.     Start preservative free artificial tears 4 times daily both eyes. Refresh or systane.     Start warm compress at bedtime, 10 minutes, both eyes.      5.    Mild Elevated Optic Nerve, both eyes.     Optic nerve OCT 05/08/2023: Right eye: thinning superior; Left  eye: thinning ST    Optic nerve FAF: Both eyes: no drusen    Likely due to oblique insertion and normal variant. However, given new left eye esotropia and abnormal oct nerve findings, recommend evaluation in the neuro-ophthalmology service first.       Patient consented to a dilated eye exam:    Yes. Side effects discussed.    Medical History:  Past Medical History:   Diagnosis Date     Hyperlipidemia, unspecified hyperlipidemia type 12/16/2019     Mild intermittent reactive airway disease without complication 3/30/2023     Upper GI bleeding 12/29/2019       Medications:  Current Outpatient Medications   Medication Sig Dispense Refill     albuterol (PROAIR HFA/PROVENTIL HFA/VENTOLIN HFA) 108 (90 Base) MCG/ACT inhaler Inhale 2 puffs into the lungs every 4 hours as needed for shortness of breath or wheezing 18 g 11     budesonide-formoterol (SYMBICORT) 80-4.5 MCG/ACT Inhaler INHALE 2 PUFFS INTO THE LUNGS TWICE DAILY 10.2 g 1     sildenafil (VIAGRA) 100 MG tablet 0.5-1 tablets (50mg-100 mg) by mouth daily prn for erectile dysfunction prior to intercourse. Avoid nitroglycerin, terazosin or doxazosin. 10 tablet 1     simvastatin (ZOCOR) 40 MG tablet Take 1 tablet (40 mg) by mouth At Bedtime 90 tablet 3   Complete documentation of historical and exam elements from today's encounter can be found in the full encounter summary report (not reduplicated in this progress note). I personally obtained the chief complaint(s) and history of present illness.  I confirmed and edited as necessary the review of systems, past medical/surgical history, family history, social history, and examination findings as documented by others; and I examined the patient myself. I personally reviewed the relevant tests, images, and reports as documented above. I formulated and edited as necessary the assessment and plan and discussed the findings and management plan with the patient and family. - Fredrick Gallo OD

## 2023-05-08 ENCOUNTER — OFFICE VISIT (OUTPATIENT)
Dept: OPHTHALMOLOGY | Facility: CLINIC | Age: 63
End: 2023-05-08
Attending: OPTOMETRIST
Payer: COMMERCIAL

## 2023-05-08 DIAGNOSIS — H43.813 VITREOUS DETACHMENT OF BOTH EYES: ICD-10-CM

## 2023-05-08 DIAGNOSIS — H47.333 CROWDED OPTIC DISC, BILATERAL: ICD-10-CM

## 2023-05-08 DIAGNOSIS — H02.883 MEIBOMIAN GLAND DYSFUNCTION (MGD) OF BOTH EYES: ICD-10-CM

## 2023-05-08 DIAGNOSIS — H02.886 MEIBOMIAN GLAND DYSFUNCTION (MGD) OF BOTH EYES: ICD-10-CM

## 2023-05-08 DIAGNOSIS — H25.13 NUCLEAR SENILE CATARACT OF BOTH EYES: Primary | ICD-10-CM

## 2023-05-08 PROCEDURE — 99207 PR BUNDLED PROCEDURE IN GLOBAL PKG: CPT | Mod: 26 | Performed by: OPTOMETRIST

## 2023-05-08 PROCEDURE — 99207 FUNDUS AUTOFLUORESCENCE IMAGE (FAF) OU (BOTH EYES): CPT | Mod: 26 | Performed by: OPTOMETRIST

## 2023-05-08 PROCEDURE — 92250 FUNDUS PHOTOGRAPHY W/I&R: CPT | Performed by: OPTOMETRIST

## 2023-05-08 PROCEDURE — 92134 CPTRZ OPH DX IMG PST SGM RTA: CPT | Performed by: OPTOMETRIST

## 2023-05-08 PROCEDURE — 92014 COMPRE OPH EXAM EST PT 1/>: CPT | Performed by: OPTOMETRIST

## 2023-05-08 PROCEDURE — 92133 CPTRZD OPH DX IMG PST SGM ON: CPT | Performed by: OPTOMETRIST

## 2023-05-08 PROCEDURE — G0463 HOSPITAL OUTPT CLINIC VISIT: HCPCS | Performed by: OPTOMETRIST

## 2023-05-08 ASSESSMENT — VISUAL ACUITY
CORRECTION_TYPE: GLASSES
OS_CC: 20/50-
METHOD: SNELLEN - LINEAR
OD_PH_CC: 20/30-2
OS_PH_CC: 20/50
OD_CC: 20/40

## 2023-05-08 ASSESSMENT — TONOMETRY
OS_IOP_MMHG: 14
IOP_METHOD: TONOPEN
OD_IOP_MMHG: 14

## 2023-05-08 ASSESSMENT — CONF VISUAL FIELD
OD_SUPERIOR_TEMPORAL_RESTRICTION: 0
OS_SUPERIOR_TEMPORAL_RESTRICTION: 0
METHOD: COUNTING FINGERS
OS_INFERIOR_NASAL_RESTRICTION: 0
OS_NORMAL: 1
OD_INFERIOR_NASAL_RESTRICTION: 0
OD_SUPERIOR_NASAL_RESTRICTION: 0
OS_INFERIOR_TEMPORAL_RESTRICTION: 0
OS_SUPERIOR_NASAL_RESTRICTION: 0
OD_NORMAL: 1
OD_INFERIOR_TEMPORAL_RESTRICTION: 0

## 2023-05-08 ASSESSMENT — REFRACTION_WEARINGRX
OD_AXIS: 179
OD_SPHERE: -5.25
OS_ADD: +2.38
OS_AXIS: 156
SPECS_TYPE: PAL
OS_SPHERE: -5.75
OD_ADD: +2.38
OD_CYLINDER: +0.50
OS_CYLINDER: +0.50

## 2023-05-08 ASSESSMENT — EXTERNAL EXAM - RIGHT EYE: OD_EXAM: NORMAL

## 2023-05-08 ASSESSMENT — EXTERNAL EXAM - LEFT EYE: OS_EXAM: NORMAL

## 2023-05-08 ASSESSMENT — SLIT LAMP EXAM - LIDS
COMMENTS: MEIBOMIAN GLAND DYSFUNCTION
COMMENTS: MEIBOMIAN GLAND DYSFUNCTION

## 2023-06-25 ENCOUNTER — HEALTH MAINTENANCE LETTER (OUTPATIENT)
Age: 63
End: 2023-06-25

## 2023-06-28 ENCOUNTER — OFFICE VISIT (OUTPATIENT)
Dept: OPHTHALMOLOGY | Facility: CLINIC | Age: 63
End: 2023-06-28
Attending: OPHTHALMOLOGY
Payer: COMMERCIAL

## 2023-06-28 DIAGNOSIS — H53.10 SUBJECTIVE VISUAL DISTURBANCE: ICD-10-CM

## 2023-06-28 DIAGNOSIS — H53.2 DOUBLE VISION: ICD-10-CM

## 2023-06-28 DIAGNOSIS — H50.05 ESOTROPIA, ALTERNATING: Primary | ICD-10-CM

## 2023-06-28 PROCEDURE — G0463 HOSPITAL OUTPT CLINIC VISIT: HCPCS | Performed by: OPHTHALMOLOGY

## 2023-06-28 PROCEDURE — 92060 SENSORIMOTOR EXAMINATION: CPT | Performed by: OPHTHALMOLOGY

## 2023-06-28 PROCEDURE — 99203 OFFICE O/P NEW LOW 30 MIN: CPT | Mod: GC | Performed by: OPHTHALMOLOGY

## 2023-06-28 ASSESSMENT — EXTERNAL EXAM - LEFT EYE: OS_EXAM: NORMAL

## 2023-06-28 ASSESSMENT — CONF VISUAL FIELD
OD_INFERIOR_NASAL_RESTRICTION: 0
OS_SUPERIOR_TEMPORAL_RESTRICTION: 0
OS_NORMAL: 1
OS_INFERIOR_TEMPORAL_RESTRICTION: 0
OD_NORMAL: 1
OD_SUPERIOR_TEMPORAL_RESTRICTION: 0
METHOD: COUNTING FINGERS
OS_SUPERIOR_NASAL_RESTRICTION: 0
OS_INFERIOR_NASAL_RESTRICTION: 0
OD_SUPERIOR_NASAL_RESTRICTION: 0
OD_INFERIOR_TEMPORAL_RESTRICTION: 0

## 2023-06-28 ASSESSMENT — TONOMETRY
OS_IOP_MMHG: 14
OD_IOP_MMHG: 16
IOP_METHOD: SINGLE KW ICARE

## 2023-06-28 ASSESSMENT — VISUAL ACUITY
OS_CC: 20/50
OD_CC: 20/30
OD_CC+: +2
CORRECTION_TYPE: GLASSES
OS_CC+: -1/+2
METHOD: SNELLEN - LINEAR

## 2023-06-28 ASSESSMENT — REFRACTION_WEARINGRX
OD_HBASE: OUT
OD_ADD: +2.38
OS_SPHERE: -5.00
OD_SPHERE: -4.50
OS_HBASE: OUT
OS_VPRISM: 1.0
OS_HPRISM: 1.0
OS_VBASE: DOWN
OS_ADD: +2.38
OD_HPRISM: 1.0
OD_CYLINDER: +0.50
SPECS_TYPE: PAL
OS_CYLINDER: +0.50
OS_AXIS: 140
OD_AXIS: 160

## 2023-06-28 ASSESSMENT — EXTERNAL EXAM - RIGHT EYE: OD_EXAM: NORMAL

## 2023-06-28 NOTE — LETTER
2023    RE: Alek Gimenez  : 1960  MRN: [id number]    Dear Dr. Monson,    Thank you for referring your patient, Alek Gimenez, to my neuro-ophthalmology clinic recently.  After a thorough neuro-ophthalmic history and examination, I came to the following conclusions:     1. 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 that resolves at near.  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    He has had some difficulty with intermittent horizontal double vision at distance for the past 1-2 years. It is bothersome to him and he would like to discuss the prognosis and the consideration for surgery. He is hoping to undergo cataract surgery in the next few months and will be having a surgical evaluation for this in the next few months.     We discussed that it is preferable for him to have the cataract surgery prior to strabismus surgery as this will help improve his ability to fuse.     2. Normal optic nerve heads in both eyes.  No optic atrophy / no papilledema.  Observe.    Plan:  Cataract extraction then strabismus surgery.  Will plan for bilateral lateral rectus resections with one on adjustable suture.    Patient will call my surgery scheduler once he has dates for his upcoming cataract extraction in both eyes. Ok to schedule strabismus surgery at least 1 month after his second cataract surgery to allow healing time from cataract extraction surgery.    Alek Gimenez is a pleasant 63 year old White male who presents to my neuro-ophthalmology clinic today having been referred by Dr. Monson for esotropia of left eye    HPI:  Patient is a 63 year old male with ophthalmic history of mild elevated optic nerve, posterior vitreous detachment, and cataract of both eyes initially presented  to Dr. Monson on 5/8/23 with 1-year history of double vision. He was given prisms from [employer] (unclear when?). He was found to have noncomitant deviation at that visit. He was also noted to have optic nerve thinning of both eyes on OCT.    Today, he notices double vision usually when driving and he tends to close one eye in order to see better. Sometimes it is there and sometimes it is not. He does not think it is more when he is tired. He has had this double vision for a couple years, mostly in the last 1-2 years. He says the double vision is always horizontal and never vertical. He doesn't have any lid drooping or swallowing issues.     He is having a lot more halos in the past 6 months and has more difficulty with with seeing smaller objects. He has seen 3D movies and has been able to see the effect well. He never had a wandering eye when he was a kid.    Independent historians:  Patient    Review of outside clinical notes:    5/8/23 -- Visit with Dr. Monson      Past medical history:    Patient Active Problem List   Diagnosis    Hyperlipidemia LDL goal <100    Lumbar radiculopathy    Mild intermittent reactive airway disease without complication       Patient has a current medication list which includes the following prescription(s): albuterol, budesonide-formoterol, sildenafil, and simvastatin.    Family history / social history:    Patient  reports that he has never smoked. He has never used smokeless tobacco. He reports current alcohol use. He reports that he does not use drugs.     Exam:  Corrected distance visual acuity was 20/30 +2 in the right eye and 20/50 -1/+2 in the left eye. Intraocular pressure was 16 in the right eye and 14 in the left eye using single kw ICare.  Color vision 11/11 right eye and 11/11 left eye.  Pupils no APD.  Anterior segment exam 3+ nuclear sclerotic cataract both eyes.  Fundus exam normal with normal appearing optic nerve heads that show mild physiologic  elevation.    Sensorimotor exam shows mostly full motility in both eyes aside from trace abduction deficit in the right eye and trace to mild abduction deficit in the left eye.  There is a relatively concomitant pattern 10-18 prism diopter esotropia in all gaze positions while the patient was wearing his prism glasses with a total of 2 base out prism and 1 base down left lens prism.     Tests ordered and interpreted today:  Sensorimotor exam as above    We discussed in detail the risks, benefits, and alternatives of eye muscle correction surgery including the very rare risk of death or serious morbidity from a general anesthesia complication and the rare risk of severe vision loss in the operative eye(s) secondary to retinal detachment or endophthalmitis.  We discussed more likely sub-optimal outcomes including the unanticipated need for additional strabismus surgery.  Finally the patient was aware that prisms glasses may be required to optimize single vision following surgery.    After a thorough discussion of these risks, the patient decided to proceed with strabismus surgery.  The surgical plan is as follows:     1. Bilateral eye muscle correction. Possible use of adjustable suture in one or both eyes.     Will check preoperatively to see if left hypertropia correction needed for fusion.  Plan for bilateral lateral rectus resections with one on adjustable suture.    Follow-up 1 week after strabismus surgery.    Plan to operate at: ASC  Prism free glasses for adjustment:  Patient will be near plano in both eyes after cataract extraction.      Again, thank you for trusting me with the care of your patient.  For further exam details, please feel free to contact our office for additional records.  If you wish to contact me regarding this patient please email me at [facility] [email or web address] or give my clinic a call to arrange a phone conversation.    Sincerely,    Claudio Armijo MD  , Neuro-Ophthalmology and  Adult Strabismus Surgery  The Malik Sims Chair in Neuro-Ophthalmology  Department of Ophthalmology and Visual Neurosciences  [facility]    DX: sagging eye syndrome, esotropia, strabismus surgery

## 2023-06-28 NOTE — PROGRESS NOTES
1. 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 that resolves at near.  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    He has had some difficulty with intermittent horizontal double vision at distance for the past 1-2 years. It is bothersome to him and he would like to discuss the prognosis and the consideration for surgery. He is hoping to undergo cataract surgery in the next few months and will be having a surgical evaluation for this in the next few months.     We discussed that it is preferable for him to have the cataract surgery prior to strabismus surgery as this will help improve his ability to fuse.     2. Normal optic nerve heads in both eyes.  No optic atrophy / no papilledema.  Observe.    Plan:  Cataract extraction then strabismus surgery.  Will plan for bilateral lateral rectus resections with one on adjustable suture.    Patient will call my surgery scheduler once he has dates for his upcoming cataract extraction in both eyes. Ok to schedule strabismus surgery at least 1 month after his second cataract surgery to allow healing time from cataract extraction surgery.    Alek Gimenez is a pleasant 63 year old White male who presents to my neuro-ophthalmology clinic today having been referred by Dr. Monson for esotropia of left eye    HPI:  Patient is a 63 year old male with ophthalmic history of mild elevated optic nerve, posterior vitreous detachment, and cataract of both eyes initially presented to Dr. Monson on 5/8/23 with 1-year history of double vision. He was given prisms from [facility] (unclear when?). He was found to have noncomitant deviation at that visit. He was also noted to have optic nerve thinning of both eyes on OCT.    Today, he notices double vision usually  when driving and he tends to close one eye in order to see better. Sometimes it is there and sometimes it is not. He does not think it is more when he is tired. He has had this double vision for a couple years, mostly in the last 1-2 years. He says the double vision is always horizontal and never vertical. He doesn't have any lid drooping or swallowing issues.     He is having a lot more halos in the past 6 months and has more difficulty with with seeing smaller objects. He has seen 3D movies and has been able to see the effect well. He never had a wandering eye when he was a kid.    Independent historians:  Patient    Review of outside clinical notes:    5/8/23 -- Visit with Dr. Monson      Past medical history:    Patient Active Problem List   Diagnosis     Hyperlipidemia LDL goal <100     Lumbar radiculopathy     Mild intermittent reactive airway disease without complication       Patient has a current medication list which includes the following prescription(s): albuterol, budesonide-formoterol, sildenafil, and simvastatin.    Family history / social history:    Patient  reports that he has never smoked. He has never used smokeless tobacco. He reports current alcohol use. He reports that he does not use drugs.     Exam:  Corrected distance visual acuity was 20/30 +2 in the right eye and 20/50 -1/+2 in the left eye. Intraocular pressure was 16 in the right eye and 14 in the left eye using single kw ICare.  Color vision 11/11 right eye and 11/11 left eye.  Pupils no APD.  Anterior segment exam 3+ nuclear sclerotic cataract both eyes.  Fundus exam normal with normal appearing optic nerve heads that show mild physiologic elevation.    Sensorimotor exam shows mostly full motility in both eyes aside from trace abduction deficit in the right eye and trace to mild abduction deficit in the left eye.  There is a relatively concomitant pattern 10-18 prism diopter esotropia in all gaze positions while the patient was  wearing his prism glasses with a total of 2 base out prism and 1 base down left lens prism.     Tests ordered and interpreted today:  Sensorimotor exam as above    We discussed in detail the risks, benefits, and alternatives of eye muscle correction surgery including the very rare risk of death or serious morbidity from a general anesthesia complication and the rare risk of severe vision loss in the operative eye(s) secondary to retinal detachment or endophthalmitis.  We discussed more likely sub-optimal outcomes including the unanticipated need for additional strabismus surgery.  Finally the patient was aware that prisms glasses may be required to optimize single vision following surgery.    After a thorough discussion of these risks, the patient decided to proceed with strabismus surgery.  The surgical plan is as follows:     1. Bilateral eye muscle correction. Possible use of adjustable suture in one or both eyes.     Will check preoperatively to see if left hypertropia correction needed for fusion.  Plan for bilateral lateral rectus resections with one on adjustable suture.    Follow-up 1 week after strabismus surgery.    Plan to operate at: ASC  Prism free glasses for adjustment:  Patient will be near plano in both eyes after cataract extraction.         Joann Rodriguez MD  Ophthalmology Resident, PGY-3  [facility]    Precharting:  Gordo Tomas, MS4  Medical Student, [facility]    35 minutes were spent on the date of the encounter by me doing chart review, history and exam, documentation, and further activities as noted above    Complete documentation of historical and exam elements from today's encounter can be found in the full encounter summary report (not reduplicated in this progress note).  I personally obtained the chief complaint(s) and history of present illness.  I confirmed and edited as necessary the review of systems, past medical/surgical history, family history, social  history, and examination findings as documented by others; and I examined the patient myself.  I personally reviewed the relevant tests, images, and reports as documented above.  I formulated and edited as necessary the assessment and plan and discussed the findings and management plan with the patient and family.  I personally reviewed the ophthalmic test(s) associated with this encounter, agree with the interpretation(s) as documented by the resident/fellow, and have edited the corresponding report(s) as necessary.     A medical student was also involved in the care of the patient today. I was present with the medical student who participated in the service and in the documentation of the note. I have  verified the history and personally performed the physical exam and medical decision making. I extensively reviewed and edited when necessary the assessment and plan. I agree with the assessment and plan of care as documented in the note    Claudio Armijo MD

## 2023-07-19 ENCOUNTER — OFFICE VISIT (OUTPATIENT)
Dept: OPHTHALMOLOGY | Facility: CLINIC | Age: 63
End: 2023-07-19
Attending: STUDENT IN AN ORGANIZED HEALTH CARE EDUCATION/TRAINING PROGRAM
Payer: COMMERCIAL

## 2023-07-19 DIAGNOSIS — H52.7 REFRACTIVE ERROR: ICD-10-CM

## 2023-07-19 DIAGNOSIS — H53.2 DIPLOPIA: ICD-10-CM

## 2023-07-19 DIAGNOSIS — H02.883 MEIBOMIAN GLAND DYSFUNCTION (MGD) OF BOTH EYES: ICD-10-CM

## 2023-07-19 DIAGNOSIS — H25.13 NUCLEAR SCLEROTIC CATARACT OF BOTH EYES: Primary | ICD-10-CM

## 2023-07-19 DIAGNOSIS — H02.886 MEIBOMIAN GLAND DYSFUNCTION (MGD) OF BOTH EYES: ICD-10-CM

## 2023-07-19 DIAGNOSIS — H43.813 PVD (POSTERIOR VITREOUS DETACHMENT), BOTH EYES: ICD-10-CM

## 2023-07-19 PROCEDURE — 76519 ECHO EXAM OF EYE: CPT | Performed by: STUDENT IN AN ORGANIZED HEALTH CARE EDUCATION/TRAINING PROGRAM

## 2023-07-19 PROCEDURE — G0463 HOSPITAL OUTPT CLINIC VISIT: HCPCS | Performed by: STUDENT IN AN ORGANIZED HEALTH CARE EDUCATION/TRAINING PROGRAM

## 2023-07-19 PROCEDURE — 92025 CPTRIZED CORNEAL TOPOGRAPHY: CPT | Performed by: STUDENT IN AN ORGANIZED HEALTH CARE EDUCATION/TRAINING PROGRAM

## 2023-07-19 PROCEDURE — 92134 CPTRZ OPH DX IMG PST SGM RTA: CPT | Performed by: STUDENT IN AN ORGANIZED HEALTH CARE EDUCATION/TRAINING PROGRAM

## 2023-07-19 PROCEDURE — 92014 COMPRE OPH EXAM EST PT 1/>: CPT | Performed by: STUDENT IN AN ORGANIZED HEALTH CARE EDUCATION/TRAINING PROGRAM

## 2023-07-19 ASSESSMENT — TONOMETRY
OS_IOP_MMHG: 16
OD_IOP_MMHG: 14
IOP_METHOD: TONOPEN

## 2023-07-19 ASSESSMENT — EXTERNAL EXAM - RIGHT EYE: OD_EXAM: NORMAL

## 2023-07-19 ASSESSMENT — REFRACTION_WEARINGRX
OS_AXIS: 140
OS_ADD: +2.38
OD_ADD: +2.38
OS_SPHERE: -5.00
SPECS_TYPE: PAL
OD_HPRISM: 1.0
OD_SPHERE: -4.50
OS_CYLINDER: +0.50
OS_HBASE: OUT
OD_HBASE: OUT
OS_HPRISM: 1.0
OD_CYLINDER: +0.50
OS_VBASE: DOWN
OD_AXIS: 160
OS_VPRISM: 1.0

## 2023-07-19 ASSESSMENT — CONF VISUAL FIELD
OS_INFERIOR_TEMPORAL_RESTRICTION: 0
OD_INFERIOR_NASAL_RESTRICTION: 0
OD_SUPERIOR_TEMPORAL_RESTRICTION: 0
OS_SUPERIOR_TEMPORAL_RESTRICTION: 0
OD_SUPERIOR_NASAL_RESTRICTION: 0
OD_INFERIOR_TEMPORAL_RESTRICTION: 0
OS_INFERIOR_NASAL_RESTRICTION: 0
OS_SUPERIOR_NASAL_RESTRICTION: 0
OD_NORMAL: 1
OS_NORMAL: 1

## 2023-07-19 ASSESSMENT — EXTERNAL EXAM - LEFT EYE: OS_EXAM: NORMAL

## 2023-07-19 ASSESSMENT — REFRACTION_MANIFEST
OS_SPHERE: -7.00
OS_AXIS: 140
OD_SPHERE: -5.75
OS_ADD: +2.50
OS_CYLINDER: +0.75
OD_CYLINDER: +0.75
OD_ADD: +2.50
OD_AXIS: 160

## 2023-07-19 ASSESSMENT — VISUAL ACUITY
OS_BAT_LOW: 20/40
OS_BAT_MED: 20/50
OS_BAT_HIGH: 20/70
OD_BAT_MED: 20/50
OD_CC: 20/30
OS_CC: 20/40
OD_BAT_LOW: 20/50
METHOD: SNELLEN - LINEAR
OD_BAT_HIGH: 20/80

## 2023-07-19 ASSESSMENT — SLIT LAMP EXAM - LIDS
COMMENTS: MGD
COMMENTS: MGD

## 2023-07-19 NOTE — NURSING NOTE
Chief Complaints and History of Present Illnesses   Patient presents with     Cataract Evaluation     Vision changes x years trouble with lights   Jaqueline Aryauk COA 7:51 AM July 19, 2023        Chief Complaint(s) and History of Present Illness(es)     Cataract Evaluation            Laterality: both eyes    Associated symptoms: glare, haloes, starburst, double vision and a need for brighter lights    Severity: moderate    Onset: gradual    Duration: 2 years    Frequency: constant    Course: gradually worsening    Activities affected: night driving and daily activities    Treatments tried: no treatments    Comments: Vision changes x years trouble with lights   Jaqueline Mayeiuk COA 7:51 AM July 19, 2023

## 2023-07-19 NOTE — PATIENT INSTRUCTIONS
Use preservative free artificial tears one drop four times a day and as needed for comfort to both eyes; some brands include: refresh, systane, thera tears, blink, etc    DO NOT use eye drops that say 'take the red out' including Visine or Clear Eyes    Do warm compresses at least one to two times a day to both eyelids for 5-10 min each time

## 2023-07-19 NOTE — PROGRESS NOTES
HPI     Cataract Evaluation    In both eyes.  Associated symptoms include glare, haloes, starburst, double vision and a need for brighter lights.  Severity is moderate.  Onset was gradual.  Duration of 2 years.  Frequency is constant.  Since onset it is gradually worsening.  Affected activities include night driving and daily activities.  Treatments tried include no treatments. Additional comments: Vision changes x years trouble with lights   Jaqueline Cueto COA 7:51 AM July 19, 2023             Last edited by Jaqueline Cueto on 7/19/2023  7:56 AM.          Review of systems for the eyes was negative other than the pertinent positives/negatives listed in the HPI.    Ocular Meds: none    Ocular Hx: refractive error OU    FOHx: no family history of glaucoma or blindness    PMHx: HLD; mild asthma    SocHx: physician working in pulmonary/critical care; Amateur astronomer    Assessment & Plan      Alek Gimenez is a 63 year old male with the following diagnoses:    Nuclear sclerotic cataract OU  - visually significant and affecting ADLs,  - patient is right dominant  - various lens options discussed with patient including premium, toric, and monofocal lenses; patient would like monofocal lenses aimed at plano and understands the possible need for glasses for distance and near vision  - r/b/a of cataract extraction with intraocular lens insertion including blindness, decreased vision, damage to surrounding structures, bleeding, infection, risk of anesthesia, and need for additional surgeries d/w pt, pt expressed understanding and would like to proceed; patient also understands that due to requirement of prisms in glasses for prisms, patient may continue to need prisms in glasses after cataract surgery; patient in agreement and informed consent was obtained and patient would like to proceed  - preop/postop drops to be prescribed: vigamox/predforte/ketorolac QID each to procedure eye starting two days prior to cataract  "surgery  - patient will see PCP for surgical clearance;  - patient to schedule POD#1, POW#1, POM#1 appt; will scheduled left eye first then right eye  - will also have patient return for tech visit in 3-4 weeks to recheck argos and pentacam given significant K value differences between both eyes on biometry, after regular lubrication of eyes    Special equipment/needs:  Eye: left  Anesthesia:MAC with Topical  Dilates to: 6.5 mm  Iris expansion:  none  Trypan Blue: No    Able lay to flat: Yes  Blood Thinner: No   Tamsulosin: No  DM: No  Fuch's/Guttae/PXF: No   LASIK/PRK/SMILE/Eye Surgery/Intravitreal injection: No   Trauma: No     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 that resolves at near  - sees Dr Armijo who noted \"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 leading to turning in of the eyes predominately symptomatic at distance\"  - plan is for strabismus surgery after cataract surgery with Dr Armijo  - follow up with Dr Armijo as planned    MGD/Dry Eyes OU  -warm compresses at least once to two times a day to both eyelids  - PFATs QID and prn OU    Posterior Vitreous Detachment OU  - no new flashes, floaters, or curtain down visual field; previously noted  - refractive status: myope  - no holes, tears, or detachment on exam; blair's negative  - counseled RD/return precautions    Refractive error  - hold on refraction until after cataract surgery    Counseled return/RD precautions    Patient disposition:   Return for Follow Up 3-4 weeks tech visit only for repeat argos and pentacam.      Attending Physician Attestation:  Complete documentation of historical and exam elements from today's encounter can be found in the full encounter summary report (not reduplicated in this progress note).  I personally obtained the chief complaint(s) and history of present illness.  " I confirmed and edited as necessary the review of systems, past medical/surgical history, family history, social history, and examination findings as documented by others; and I examined the patient myself.  I personally reviewed the relevant tests, images, and reports as documented above.  I formulated and edited as necessary the assessment and plan and discussed the findings and management plan with the patient and family. Today with Alek Gimenez, I reviewed the indications, risks, benefits, and alternatives of the proposed surgical procedure including, but not limited to, failure obtain the desired result  and need for additional surgery, bleeding, infection, loss of vision, loss of the eye, and the remote possibility of permanent damage to any organ system or death with the use of anesthesia.  I provided multiple opportunities for the questions, answered all questions to the best of my ability, and confirmed that my answers and my discussion were understood.  - Jen Buck MD

## 2023-07-21 ENCOUNTER — TELEPHONE (OUTPATIENT)
Dept: OPHTHALMOLOGY | Facility: CLINIC | Age: 63
End: 2023-07-21
Payer: COMMERCIAL

## 2023-07-21 PROBLEM — H25.13 NUCLEAR SCLEROTIC CATARACT OF BOTH EYES: Status: ACTIVE | Noted: 2023-07-19

## 2023-07-21 NOTE — TELEPHONE ENCOUNTER
Called patient to schedule surgery with Dr. Buck    Date of Surgery: 9/28,10/12    Location of surgery: [facility]    Pre-Op H&P: PCP    Pre/Post Imaging:  No    Post-Op Appt Date: 9/29,10/6,10/13,10/20,10/25,11/10    Surgery Packet Mailed: yes     Additional comments: Spoke with patient, they are aware of above date, they will review packet and reach out with any questions           Anna C. Schoenecker on 7/21/2023 at 11:36 AM

## 2023-08-02 ENCOUNTER — TELEPHONE (OUTPATIENT)
Dept: OPHTHALMOLOGY | Facility: CLINIC | Age: 63
End: 2023-08-02
Payer: COMMERCIAL

## 2023-08-03 ENCOUNTER — APPOINTMENT (OUTPATIENT)
Dept: URBAN - METROPOLITAN AREA CLINIC 259 | Age: 63
Setting detail: DERMATOLOGY
End: 2023-08-03

## 2023-08-03 DIAGNOSIS — L81.4 OTHER MELANIN HYPERPIGMENTATION: ICD-10-CM

## 2023-08-03 DIAGNOSIS — D22 MELANOCYTIC NEVI: ICD-10-CM

## 2023-08-03 DIAGNOSIS — Z71.89 OTHER SPECIFIED COUNSELING: ICD-10-CM

## 2023-08-03 DIAGNOSIS — L57.8 OTHER SKIN CHANGES DUE TO CHRONIC EXPOSURE TO NONIONIZING RADIATION: ICD-10-CM

## 2023-08-03 DIAGNOSIS — D18.0 HEMANGIOMA: ICD-10-CM

## 2023-08-03 DIAGNOSIS — L82.1 OTHER SEBORRHEIC KERATOSIS: ICD-10-CM

## 2023-08-03 DIAGNOSIS — D49.2 NEOPLASM OF UNSPECIFIED BEHAVIOR OF BONE, SOFT TISSUE, AND SKIN: ICD-10-CM

## 2023-08-03 PROBLEM — H53.2 DOUBLE VISION: Status: ACTIVE | Noted: 2023-06-28

## 2023-08-03 PROBLEM — D18.01 HEMANGIOMA OF SKIN AND SUBCUTANEOUS TISSUE: Status: ACTIVE | Noted: 2023-08-03

## 2023-08-03 PROBLEM — D22.5 MELANOCYTIC NEVI OF TRUNK: Status: ACTIVE | Noted: 2023-08-03

## 2023-08-03 PROCEDURE — 11301 SHAVE SKIN LESION 0.6-1.0 CM: CPT

## 2023-08-03 PROCEDURE — OTHER MIPS QUALITY: OTHER

## 2023-08-03 PROCEDURE — OTHER COUNSELING: OTHER

## 2023-08-03 PROCEDURE — OTHER SHAVE REMOVAL: OTHER

## 2023-08-03 PROCEDURE — 99203 OFFICE O/P NEW LOW 30 MIN: CPT | Mod: 25

## 2023-08-03 ASSESSMENT — LOCATION DETAILED DESCRIPTION DERM
LOCATION DETAILED: LEFT SUPERIOR MEDIAL UPPER BACK
LOCATION DETAILED: LEFT MEDIAL UPPER BACK
LOCATION DETAILED: XIPHOID

## 2023-08-03 ASSESSMENT — LOCATION ZONE DERM: LOCATION ZONE: TRUNK

## 2023-08-03 ASSESSMENT — LOCATION SIMPLE DESCRIPTION DERM
LOCATION SIMPLE: ABDOMEN
LOCATION SIMPLE: LEFT UPPER BACK

## 2023-08-03 NOTE — HPI: FULL BODY SKIN EXAMINATION
How Severe Are Your Spot(S)?: mild
What Type Of Note Output Would You Prefer (Optional)?: Standard Output
What Is The Reason For Today's Visit?: Full Body Skin Examination
What Is The Reason For Today's Visit? (Being Monitored For X): concerning skin lesions on an annual basis
Additional History: Pt is present today for an annual skin check. Pt denies any personal or family history of skin cancer. He mentions a lesion of concern on his stomach that has change color. He is here seeking further evaluation and treatment.

## 2023-08-11 ENCOUNTER — ALLIED HEALTH/NURSE VISIT (OUTPATIENT)
Dept: OPHTHALMOLOGY | Facility: CLINIC | Age: 63
End: 2023-08-11
Attending: STUDENT IN AN ORGANIZED HEALTH CARE EDUCATION/TRAINING PROGRAM
Payer: COMMERCIAL

## 2023-08-11 DIAGNOSIS — H25.13 NUCLEAR SCLEROTIC CATARACT OF BOTH EYES: Primary | ICD-10-CM

## 2023-08-11 PROCEDURE — 999N000103 HC STATISTIC NO CHARGE FACILITY FEE

## 2023-08-11 PROCEDURE — 99207 PR BUNDLED PROCEDURE IN GLOBAL PKG: CPT

## 2023-08-11 PROCEDURE — 76519 ECHO EXAM OF EYE: CPT | Mod: 26 | Performed by: STUDENT IN AN ORGANIZED HEALTH CARE EDUCATION/TRAINING PROGRAM

## 2023-08-11 PROCEDURE — 76519 ECHO EXAM OF EYE: CPT

## 2023-08-29 ENCOUNTER — MYC MEDICAL ADVICE (OUTPATIENT)
Dept: FAMILY MEDICINE | Facility: CLINIC | Age: 63
End: 2023-08-29
Payer: COMMERCIAL

## 2023-08-29 DIAGNOSIS — J45.20 MILD INTERMITTENT REACTIVE AIRWAY DISEASE WITHOUT COMPLICATION: ICD-10-CM

## 2023-08-29 RX ORDER — BUDESONIDE AND FORMOTEROL FUMARATE DIHYDRATE 80; 4.5 UG/1; UG/1
AEROSOL RESPIRATORY (INHALATION)
Qty: 10.2 G | Refills: 1 | Status: SHIPPED | OUTPATIENT
Start: 2023-08-29 | End: 2024-01-08

## 2023-09-13 NOTE — PROGRESS NOTES
[address], [address]  [facility] [address]  Phone: [phone]  Primary Provider: Della Pratt  Pre-op Performing Provider: DELLA PRATT      PREOPERATIVE EVALUATION:  Today's date: 9/14/2023    Alek Gimenez is a 63 year old male who presents for a preoperative evaluation.      9/14/2023     8:29 AM   Additional Questions   Roomed by roldan       Surgical Information:  Surgery/Procedure: PHACOEMULSIFICATION, CATARACT, WITH INTRAOCULAR LENS IMPLANT LEFT EYE & RIGHT EYE  Surgery Location: Clinics and surgery [facility]  Surgeon: Jen Buck MD   Surgery Date:  9/28/2023 Left Eye, 10/12/2023 Right Eye  Time of Surgery: 11:15 AM Left Eye, 12:15PM Right Eye  Where patient plans to recover: At home with family  Fax number for surgical facility: Note does not need to be faxed, will be available electronically in Epic.    Assessment & Plan     The proposed surgical procedure is considered LOW risk.    Preop general physical exam      Cataract of both eyes, unspecified cataract type  Suitable candidate for cataract surgery   - Adult Eye  Referral; Future  - Adult Eye  Referral; Future    Mild intermittent reactive airway disease without complication  Stable   - albuterol (PROAIR HFA/PROVENTIL HFA/VENTOLIN HFA) 108 (90 Base) MCG/ACT inhaler; Inhale 2 puffs into the lungs every 4 hours as needed for shortness of breath or wheezing    Strabismus  Will need surgery for this as well; suitable candidate for that too  - Adult Eye  Referral; Future  - Adult Eye  Referral; Future    Hyperlipidemia LDL goal <100  On statin therapy     Erectile dysfunction, unspecified erectile dysfunction type    - sildenafil (VIAGRA) 100 MG tablet; 0.5-1 tablets (50mg-100 mg) by mouth daily prn for erectile dysfunction prior to intercourse. Avoid nitroglycerin, terazosin or doxazosin.            - No identified additional risk factors other than previously  addressed    Antiplatelet or Anticoagulation Medication Instructions:   - Patient is on no antiplatelet or anticoagulation medications.    Additional Medication Instructions:  Patient is to take all scheduled medications on the day of surgery EXCEPT for modifications listed below:    RECOMMENDATION:  APPROVAL GIVEN to proceed with proposed procedure, without further diagnostic evaluation.      No LOS data to display   Time spent by me doing chart review, history and exam, documentation and further activities per the note      Subjective       HPI related to upcoming procedure: Needs preop for cataract surgery.  This patient reports being able to perform 4 METS of physical activity without chest pain or dyspnea.           9/14/2023     8:27 AM   Preop Questions   1. Have you ever had a heart attack or stroke? No   2. Have you ever had surgery on your heart or blood vessels, such as a stent placement, a coronary artery bypass, or surgery on an artery in your head, neck, heart, or legs? No   3. Do you have chest pain with activity? No   4. Do you have a history of  heart failure? No   5. Do you currently have a cold, bronchitis or symptoms of other infection? No   6. Do you have a cough, shortness of breath, or wheezing? No   7. Do you or anyone in your family have previous history of blood clots? No   8. Do you or does anyone in your family have a serious bleeding problem such as prolonged bleeding following surgeries or cuts? No   9. Have you ever had problems with anemia or been told to take iron pills? No   10. Have you had any abnormal blood loss such as black, tarry or bloody stools? No   11. Have you ever had a blood transfusion? No   12. Are you willing to have a blood transfusion if it is medically needed before, during, or after your surgery? Yes   13. Have you or any of your relatives ever had problems with anesthesia? No   14. Do you have sleep apnea, excessive snoring or daytime drowsiness? No   15. Do you  have any artifical heart valves or other implanted medical devices like a pacemaker, defibrillator, or continuous glucose monitor? No   16. Do you have artificial joints? No   17. Are you allergic to latex? YES:        Review of Systems  Constitutional, neuro, ENT, endocrine, pulmonary, cardiac, gastrointestinal, genitourinary, musculoskeletal, integument and psychiatric systems are negative, except as otherwise noted.    Patient Active Problem List    Diagnosis Date Noted    Nuclear sclerotic cataract of both eyes 07/19/2023     Priority: Medium    Double vision 06/28/2023     Priority: Medium    Mild intermittent reactive airway disease without complication 03/30/2023     Priority: Medium    Lumbar radiculopathy 03/11/2021     Priority: Medium    Hyperlipidemia LDL goal <100 12/16/2019     Priority: Medium      Past Medical History:   Diagnosis Date    Hyperlipidemia, unspecified hyperlipidemia type 12/16/2019    Mild intermittent reactive airway disease without complication 3/30/2023    Upper GI bleeding 12/29/2019     Past Surgical History:   Procedure Laterality Date    ESOPHAGOSCOPY, GASTROSCOPY, DUODENOSCOPY (EGD), COMBINED N/A 12/29/2019    Procedure: Esophagogastroduodenoscopy, With Biopsy;  Surgeon: Дмитрий Peña MD;  Location:  GI     Current Outpatient Medications   Medication Sig Dispense Refill    albuterol (PROAIR HFA/PROVENTIL HFA/VENTOLIN HFA) 108 (90 Base) MCG/ACT inhaler Inhale 2 puffs into the lungs every 4 hours as needed for shortness of breath or wheezing 18 g 11    budesonide-formoterol (SYMBICORT) 80-4.5 MCG/ACT Inhaler INHALE 2 PUFFS INTO THE LUNGS TWICE DAILY 10.2 g 1    sildenafil (VIAGRA) 100 MG tablet 0.5-1 tablets (50mg-100 mg) by mouth daily prn for erectile dysfunction prior to intercourse. Avoid nitroglycerin, terazosin or doxazosin. 30 tablet 11    simvastatin (ZOCOR) 40 MG tablet Take 1 tablet (40 mg) by mouth At Bedtime 90 tablet 3       No Known Allergies     Social  History     Tobacco Use    Smoking status: Never    Smokeless tobacco: Never   Substance Use Topics    Alcohol use: Yes     Comment: 1 drink per month     No family history on file.  History   Drug Use No         Objective     BP (!) 132/91 (BP Location: Right arm, Patient Position: Sitting, Cuff Size: Adult Regular)   Pulse 71   Temp 97.9  F (36.6  C) (Oral)   Resp 18   Ht 1.829 m (6')   Wt 94.8 kg (208 lb 14.4 oz)   SpO2 94%   BMI 28.33 kg/m      Physical Exam    GENERAL APPEARANCE: healthy, alert and no distress     EYES: EOMI,  PERRL     HENT: ear canals and TM's normal and nose and mouth without ulcers or lesions     NECK: no adenopathy, no asymmetry, masses, or scars and thyroid normal to palpation     RESP: lungs clear to auscultation - no rales, rhonchi or wheezes     CV: regular rates and rhythm, normal S1 S2, no S3 or S4 and no murmur, click or rub     ABDOMEN:  soft, nontender, no HSM or masses and bowel sounds normal     MS: extremities normal- no gross deformities noted, no evidence of inflammation in joints, FROM in all extremities.     SKIN: no suspicious lesions or rashes     NEURO: Normal strength and tone, sensory exam grossly normal, mentation intact and speech normal     PSYCH: mentation appears normal. and affect normal/bright     LYMPHATICS: No cervical adenopathy    Recent Labs   Lab Test 03/30/23  1617   HGB 15.4         POTASSIUM 4.6   CR 1.18*        Diagnostics:     No EKG required for low risk surgery (cataract, skin procedure, breast biopsy, etc).    Revised Cardiac Risk Index (RCRI):  The patient has the following serious cardiovascular risks for perioperative complications:   - No serious cardiac risks = 0 points     RCRI Interpretation: 0 points: Class I (very low risk - 0.4% complication rate)         Signed Electronically by: Dirk Hayes MD  Copy of this evaluation report is provided to requesting physician.    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  He has had no significant changes in his medical state since his preop history and physical with me dated 9/14/2023.

## 2023-09-14 ENCOUNTER — OFFICE VISIT (OUTPATIENT)
Dept: FAMILY MEDICINE | Facility: CLINIC | Age: 63
End: 2023-09-14
Payer: COMMERCIAL

## 2023-09-14 VITALS
TEMPERATURE: 97.9 F | BODY MASS INDEX: 28.3 KG/M2 | SYSTOLIC BLOOD PRESSURE: 132 MMHG | HEART RATE: 71 BPM | WEIGHT: 208.9 LBS | HEIGHT: 72 IN | RESPIRATION RATE: 18 BRPM | OXYGEN SATURATION: 94 % | DIASTOLIC BLOOD PRESSURE: 91 MMHG

## 2023-09-14 DIAGNOSIS — Z01.818 PREOP GENERAL PHYSICAL EXAM: Primary | ICD-10-CM

## 2023-09-14 DIAGNOSIS — Z23 NEED FOR PROPHYLACTIC VACCINATION AND INOCULATION AGAINST INFLUENZA: ICD-10-CM

## 2023-09-14 DIAGNOSIS — H50.9 STRABISMUS: ICD-10-CM

## 2023-09-14 DIAGNOSIS — E78.5 HYPERLIPIDEMIA LDL GOAL <100: ICD-10-CM

## 2023-09-14 DIAGNOSIS — H26.9 CATARACT OF BOTH EYES, UNSPECIFIED CATARACT TYPE: ICD-10-CM

## 2023-09-14 DIAGNOSIS — J45.20 MILD INTERMITTENT REACTIVE AIRWAY DISEASE WITHOUT COMPLICATION: ICD-10-CM

## 2023-09-14 DIAGNOSIS — N52.9 ERECTILE DYSFUNCTION, UNSPECIFIED ERECTILE DYSFUNCTION TYPE: ICD-10-CM

## 2023-09-14 PROCEDURE — 99214 OFFICE O/P EST MOD 30 MIN: CPT | Mod: 25 | Performed by: INTERNAL MEDICINE

## 2023-09-14 PROCEDURE — 90471 IMMUNIZATION ADMIN: CPT | Performed by: INTERNAL MEDICINE

## 2023-09-14 PROCEDURE — 90682 RIV4 VACC RECOMBINANT DNA IM: CPT | Performed by: INTERNAL MEDICINE

## 2023-09-14 RX ORDER — ALBUTEROL SULFATE 90 UG/1
2 AEROSOL, METERED RESPIRATORY (INHALATION) EVERY 4 HOURS PRN
Qty: 18 G | Refills: 11 | Status: SHIPPED | OUTPATIENT
Start: 2023-09-14

## 2023-09-14 RX ORDER — SILDENAFIL 100 MG/1
TABLET, FILM COATED ORAL
Qty: 30 TABLET | Refills: 11 | Status: SHIPPED | OUTPATIENT
Start: 2023-09-14

## 2023-09-14 ASSESSMENT — ASTHMA QUESTIONNAIRES: ACT_TOTALSCORE: 24

## 2023-09-14 ASSESSMENT — PAIN SCALES - GENERAL: PAINLEVEL: NO PAIN (0)

## 2023-09-27 ENCOUNTER — ANESTHESIA EVENT (OUTPATIENT)
Dept: SURGERY | Facility: AMBULATORY SURGERY CENTER | Age: 63
End: 2023-09-27
Payer: COMMERCIAL

## 2023-09-27 NOTE — ANESTHESIA PREPROCEDURE EVALUATION
Anesthesia Pre-Procedure Evaluation    Patient: Alek Gimenez   MRN: [id number] : 1960        Procedure : Procedure(s):  LEFT EYE PHACOEMULSIFICATION, CATARACT, WITH INTRAOCULAR LENS IMPLANT          Past Medical History:   Diagnosis Date    Hyperlipidemia, unspecified hyperlipidemia type 2019    Mild intermittent reactive airway disease without complication 3/30/2023    Upper GI bleeding 2019      Past Surgical History:   Procedure Laterality Date    ESOPHAGOSCOPY, GASTROSCOPY, DUODENOSCOPY (EGD), COMBINED N/A 2019    Procedure: Esophagogastroduodenoscopy, With Biopsy;  Surgeon: Дмитрий Peña MD;  Location:  GI      No Known Allergies   Social History     Tobacco Use    Smoking status: Never    Smokeless tobacco: Never   Substance Use Topics    Alcohol use: Yes     Comment: 1 drink per month      Wt Readings from Last 1 Encounters:   23 94.8 kg (208 lb 14.4 oz)        Anesthesia Evaluation   Pt has had prior anesthetic. Type: MAC.        ROS/MED HX  ENT/Pulmonary: Comment: strabismus    (+)                     asthma               (-) recent URI   Neurologic:    (-) no CVA   Cardiovascular:     (+) Dyslipidemia - -   -  - -                                   (-) MCKENNA   METS/Exercise Tolerance:     Hematologic:    (-) history of blood clots   Musculoskeletal:       GI/Hepatic:    (-) esophageal disease   Renal/Genitourinary:    (-) renal disease   Endo:    (-) Type II DM   Psychiatric/Substance Use:    (-) chronic opioid use history   Infectious Disease:    (-) Recent Fever   Malignancy:       Other:      (+)  , no H/O Chronic Pain,         Physical Exam    Airway        Mallampati: II   TM distance: > 3 FB   Neck ROM: full   Mouth opening: > 3 cm    Respiratory Devices and Support         Dental       (+) Minor Abnormalities - some fillings, tiny chips      Cardiovascular   cardiovascular exam normal          Pulmonary   pulmonary exam normal                OUTSIDE  LABS:  CBC:   Lab Results   Component Value Date    WBC 9.2 03/30/2023    WBC 8.2 05/19/2021    HGB 15.4 03/30/2023    HGB 14.7 05/19/2021    HCT 46.2 03/30/2023    HCT 43.3 05/19/2021     03/30/2023     05/19/2021     BMP:   Lab Results   Component Value Date     03/30/2023     05/19/2021    POTASSIUM 4.6 03/30/2023    POTASSIUM 4.2 05/19/2021    CHLORIDE 103 03/30/2023    CHLORIDE 105 05/19/2021    CO2 24 03/30/2023    CO2 27 05/19/2021    BUN 19.6 03/30/2023    BUN 17 05/19/2021    CR 1.18 (H) 03/30/2023    CR 1.01 05/19/2021    GLC 95 03/30/2023    GLC 94 05/19/2021     COAGS: No results found for: PTT, INR, FIBR  POC: No results found for: BGM, HCG, HCGS  HEPATIC:   Lab Results   Component Value Date    ALBUMIN 4.6 03/30/2023    PROTTOTAL 7.6 03/30/2023    ALT 36 03/30/2023    AST 24 03/30/2023    ALKPHOS 115 03/30/2023    BILITOTAL 0.4 03/30/2023     OTHER:   Lab Results   Component Value Date    A1C 5.3 12/29/2019    MARIA ESTHER 9.4 03/30/2023    TSH 2.34 08/30/2005       Anesthesia Plan    ASA Status:  1    NPO Status:  NPO Appropriate    Anesthesia Type: MAC.              Consents    Anesthesia Plan(s) and associated risks, benefits, and realistic alternatives discussed. Questions answered and patient/representative(s) expressed understanding.     - Discussed: Risks, Benefits and Alternatives for BOTH SEDATION and the PROCEDURE were discussed     - Discussed with:  Patient            Postoperative Care            Comments:                Hong Chambers MD

## 2023-09-28 ENCOUNTER — ANESTHESIA (OUTPATIENT)
Dept: SURGERY | Facility: AMBULATORY SURGERY CENTER | Age: 63
End: 2023-09-28
Payer: COMMERCIAL

## 2023-09-28 ENCOUNTER — HOSPITAL ENCOUNTER (OUTPATIENT)
Facility: AMBULATORY SURGERY CENTER | Age: 63
Discharge: HOME | End: 2023-09-28
Attending: STUDENT IN AN ORGANIZED HEALTH CARE EDUCATION/TRAINING PROGRAM
Payer: COMMERCIAL

## 2023-09-28 VITALS
HEIGHT: 72 IN | TEMPERATURE: 97.6 F | HEART RATE: 63 BPM | WEIGHT: 208 LBS | OXYGEN SATURATION: 98 % | SYSTOLIC BLOOD PRESSURE: 108 MMHG | BODY MASS INDEX: 28.17 KG/M2 | DIASTOLIC BLOOD PRESSURE: 73 MMHG | RESPIRATION RATE: 16 BRPM

## 2023-09-28 DIAGNOSIS — H25.13 NUCLEAR SCLEROTIC CATARACT OF BOTH EYES: Primary | ICD-10-CM

## 2023-09-28 PROCEDURE — 66984 XCAPSL CTRC RMVL W/O ECP: CPT | Mod: LT | Performed by: STUDENT IN AN ORGANIZED HEALTH CARE EDUCATION/TRAINING PROGRAM

## 2023-09-28 PROCEDURE — 66984 XCAPSL CTRC RMVL W/O ECP: CPT | Mod: LT

## 2023-09-28 DEVICE — LENS CC60WF 14.5 CLAREON UV ASPHERIC BICONVEX IOL: Type: IMPLANTABLE DEVICE | Site: EYE | Status: FUNCTIONAL

## 2023-09-28 RX ORDER — LABETALOL HYDROCHLORIDE 5 MG/ML
10 INJECTION, SOLUTION INTRAVENOUS
Status: DISCONTINUED | OUTPATIENT
Start: 2023-09-28 | End: 2023-09-29 | Stop reason: HOSPADM

## 2023-09-28 RX ORDER — HYDROMORPHONE HYDROCHLORIDE 1 MG/ML
0.2 INJECTION, SOLUTION INTRAMUSCULAR; INTRAVENOUS; SUBCUTANEOUS EVERY 5 MIN PRN
Status: DISCONTINUED | OUTPATIENT
Start: 2023-09-28 | End: 2023-09-29 | Stop reason: HOSPADM

## 2023-09-28 RX ORDER — DEXAMETHASONE SODIUM PHOSPHATE 4 MG/ML
INJECTION, SOLUTION INTRA-ARTICULAR; INTRALESIONAL; INTRAMUSCULAR; INTRAVENOUS; SOFT TISSUE PRN
Status: DISCONTINUED | OUTPATIENT
Start: 2023-09-28 | End: 2023-09-28 | Stop reason: HOSPADM

## 2023-09-28 RX ORDER — MOXIFLOXACIN 5 MG/ML
1 SOLUTION/ DROPS OPHTHALMIC 4 TIMES DAILY
Qty: 3 ML | Refills: 0 | Status: SHIPPED | OUTPATIENT
Start: 2023-09-28 | End: 2023-10-05

## 2023-09-28 RX ORDER — PREDNISOLONE ACETATE 10 MG/ML
SUSPENSION/ DROPS OPHTHALMIC
Qty: 5 ML | Refills: 0 | Status: SHIPPED | OUTPATIENT
Start: 2023-09-28 | End: 2023-10-12

## 2023-09-28 RX ORDER — ONDANSETRON 2 MG/ML
4 INJECTION INTRAMUSCULAR; INTRAVENOUS EVERY 30 MIN PRN
Status: DISCONTINUED | OUTPATIENT
Start: 2023-09-28 | End: 2023-09-29 | Stop reason: HOSPADM

## 2023-09-28 RX ORDER — ONDANSETRON 4 MG/1
4 TABLET, ORALLY DISINTEGRATING ORAL EVERY 30 MIN PRN
Status: DISCONTINUED | OUTPATIENT
Start: 2023-09-28 | End: 2023-09-29 | Stop reason: HOSPADM

## 2023-09-28 RX ORDER — HYDROMORPHONE HYDROCHLORIDE 1 MG/ML
0.4 INJECTION, SOLUTION INTRAMUSCULAR; INTRAVENOUS; SUBCUTANEOUS EVERY 5 MIN PRN
Status: DISCONTINUED | OUTPATIENT
Start: 2023-09-28 | End: 2023-09-29 | Stop reason: HOSPADM

## 2023-09-28 RX ORDER — FENTANYL CITRATE 50 UG/ML
25 INJECTION, SOLUTION INTRAMUSCULAR; INTRAVENOUS EVERY 5 MIN PRN
Status: DISCONTINUED | OUTPATIENT
Start: 2023-09-28 | End: 2023-09-29 | Stop reason: HOSPADM

## 2023-09-28 RX ORDER — OXYCODONE HYDROCHLORIDE 5 MG/1
10 TABLET ORAL
Status: DISCONTINUED | OUTPATIENT
Start: 2023-09-28 | End: 2023-09-29 | Stop reason: HOSPADM

## 2023-09-28 RX ORDER — DICLOFENAC SODIUM 1 MG/ML
1 SOLUTION/ DROPS OPHTHALMIC
Status: COMPLETED | OUTPATIENT
Start: 2023-09-28 | End: 2023-09-28

## 2023-09-28 RX ORDER — KETOROLAC TROMETHAMINE 5 MG/ML
SOLUTION OPHTHALMIC
Qty: 5 ML | Refills: 0 | Status: SHIPPED | OUTPATIENT
Start: 2023-09-28 | End: 2023-10-12

## 2023-09-28 RX ORDER — PROPARACAINE HYDROCHLORIDE 5 MG/ML
1 SOLUTION/ DROPS OPHTHALMIC ONCE
Status: COMPLETED | OUTPATIENT
Start: 2023-09-28 | End: 2023-09-28

## 2023-09-28 RX ORDER — LIDOCAINE 40 MG/G
CREAM TOPICAL
Status: DISCONTINUED | OUTPATIENT
Start: 2023-09-28 | End: 2023-09-29 | Stop reason: HOSPADM

## 2023-09-28 RX ORDER — SODIUM CHLORIDE, SODIUM LACTATE, POTASSIUM CHLORIDE, CALCIUM CHLORIDE 600; 310; 30; 20 MG/100ML; MG/100ML; MG/100ML; MG/100ML
INJECTION, SOLUTION INTRAVENOUS CONTINUOUS
Status: CANCELLED | OUTPATIENT
Start: 2023-09-28

## 2023-09-28 RX ORDER — BALANCED SALT SOLUTION 6.4; .75; .48; .3; 3.9; 1.7 MG/ML; MG/ML; MG/ML; MG/ML; MG/ML; MG/ML
SOLUTION OPHTHALMIC PRN
Status: DISCONTINUED | OUTPATIENT
Start: 2023-09-28 | End: 2023-09-28 | Stop reason: HOSPADM

## 2023-09-28 RX ORDER — MOXIFLOXACIN IN NACL,ISO-OS/PF 0.3MG/0.3
SYRINGE (ML) INTRAOCULAR PRN
Status: DISCONTINUED | OUTPATIENT
Start: 2023-09-28 | End: 2023-09-28 | Stop reason: HOSPADM

## 2023-09-28 RX ORDER — CYCLOPENTOLAT/TROPIC/PHENYLEPH 1%-1%-2.5%
1 DROPS (EA) OPHTHALMIC (EYE)
Status: COMPLETED | OUTPATIENT
Start: 2023-09-28 | End: 2023-09-28

## 2023-09-28 RX ORDER — ACETAMINOPHEN 325 MG/1
975 TABLET ORAL ONCE
Status: COMPLETED | OUTPATIENT
Start: 2023-09-28 | End: 2023-09-28

## 2023-09-28 RX ORDER — SODIUM CHLORIDE, SODIUM LACTATE, POTASSIUM CHLORIDE, CALCIUM CHLORIDE 600; 310; 30; 20 MG/100ML; MG/100ML; MG/100ML; MG/100ML
INJECTION, SOLUTION INTRAVENOUS CONTINUOUS
Status: DISCONTINUED | OUTPATIENT
Start: 2023-09-28 | End: 2023-09-29 | Stop reason: HOSPADM

## 2023-09-28 RX ORDER — LIDOCAINE HYDROCHLORIDE 10 MG/ML
INJECTION, SOLUTION EPIDURAL; INFILTRATION; INTRACAUDAL; PERINEURAL PRN
Status: DISCONTINUED | OUTPATIENT
Start: 2023-09-28 | End: 2023-09-28 | Stop reason: HOSPADM

## 2023-09-28 RX ORDER — TIMOLOL MALEATE 5 MG/ML
SOLUTION/ DROPS OPHTHALMIC PRN
Status: DISCONTINUED | OUTPATIENT
Start: 2023-09-28 | End: 2023-09-28 | Stop reason: HOSPADM

## 2023-09-28 RX ORDER — FENTANYL CITRATE 50 UG/ML
50 INJECTION, SOLUTION INTRAMUSCULAR; INTRAVENOUS EVERY 5 MIN PRN
Status: DISCONTINUED | OUTPATIENT
Start: 2023-09-28 | End: 2023-09-29 | Stop reason: HOSPADM

## 2023-09-28 RX ORDER — MOXIFLOXACIN 5 MG/ML
1 SOLUTION/ DROPS OPHTHALMIC
Status: COMPLETED | OUTPATIENT
Start: 2023-09-28 | End: 2023-09-28

## 2023-09-28 RX ORDER — OXYCODONE HYDROCHLORIDE 5 MG/1
5 TABLET ORAL
Status: DISCONTINUED | OUTPATIENT
Start: 2023-09-28 | End: 2023-09-29 | Stop reason: HOSPADM

## 2023-09-28 RX ORDER — HYDRALAZINE HYDROCHLORIDE 20 MG/ML
2.5-5 INJECTION INTRAMUSCULAR; INTRAVENOUS EVERY 10 MIN PRN
Status: DISCONTINUED | OUTPATIENT
Start: 2023-09-28 | End: 2023-09-29 | Stop reason: HOSPADM

## 2023-09-28 RX ORDER — FENTANYL CITRATE 50 UG/ML
INJECTION, SOLUTION INTRAMUSCULAR; INTRAVENOUS PRN
Status: DISCONTINUED | OUTPATIENT
Start: 2023-09-28 | End: 2023-09-28

## 2023-09-28 RX ORDER — TETRACAINE HYDROCHLORIDE 5 MG/ML
SOLUTION OPHTHALMIC PRN
Status: DISCONTINUED | OUTPATIENT
Start: 2023-09-28 | End: 2023-09-28 | Stop reason: HOSPADM

## 2023-09-28 RX ADMIN — SODIUM CHLORIDE, SODIUM LACTATE, POTASSIUM CHLORIDE, CALCIUM CHLORIDE: 600; 310; 30; 20 INJECTION, SOLUTION INTRAVENOUS at 07:07

## 2023-09-28 RX ADMIN — MOXIFLOXACIN 1 DROP: 5 SOLUTION/ DROPS OPHTHALMIC at 07:01

## 2023-09-28 RX ADMIN — Medication 1 DROP: at 07:01

## 2023-09-28 RX ADMIN — Medication 1 DROP: at 07:05

## 2023-09-28 RX ADMIN — DICLOFENAC SODIUM 1 DROP: 1 SOLUTION/ DROPS OPHTHALMIC at 07:05

## 2023-09-28 RX ADMIN — DICLOFENAC SODIUM 1 DROP: 1 SOLUTION/ DROPS OPHTHALMIC at 07:01

## 2023-09-28 RX ADMIN — Medication 1 DROP: at 07:09

## 2023-09-28 RX ADMIN — FENTANYL CITRATE 100 MCG: 50 INJECTION, SOLUTION INTRAMUSCULAR; INTRAVENOUS at 07:52

## 2023-09-28 RX ADMIN — ACETAMINOPHEN 975 MG: 325 TABLET ORAL at 06:59

## 2023-09-28 RX ADMIN — MOXIFLOXACIN 1 DROP: 5 SOLUTION/ DROPS OPHTHALMIC at 07:05

## 2023-09-28 RX ADMIN — PROPARACAINE HYDROCHLORIDE 1 DROP: 5 SOLUTION/ DROPS OPHTHALMIC at 07:00

## 2023-09-28 RX ADMIN — MOXIFLOXACIN 1 DROP: 5 SOLUTION/ DROPS OPHTHALMIC at 07:09

## 2023-09-28 RX ADMIN — DICLOFENAC SODIUM 1 DROP: 1 SOLUTION/ DROPS OPHTHALMIC at 07:09

## 2023-09-28 NOTE — DISCHARGE INSTRUCTIONS
"Cataract Surgery Post-Operative Instructions      You have undergone cataract surgery today. Take it easy as the mild anesthesia wears off.     After cataract surgery you will have an eye shield over your eye and things might be a little blurry. This is normal. It will clear up over the coming days. Your eye may feel a little scratchy and this should get better over the next few days    It is okay to resume your previous diet    Use Tylenol (if there is no contraindication from a medical standpoint) if you experience any eye pain    Avoid sleeping on or putting pressure on the operated eye    Sleep with the eye shield at bedtime for the first week    You may resume light activity tomorrow, but no heavy lifting, no straining, no bending over especially the first week    Do not rub the eyes, no water in the eyes (no pools or hot tubs or tap water); Please wait until tomorrow to shower, and when you do shower take care not to get water in the surgical eye    You can continue the following eye drops starting tonight:    Vigamox (tan cap) 1 drop four times a day to surgical eye for 1 week, then stop  Ketorolac (grey top) 1 drop four times a day to surgical eye for 1 week, then 1 drop three times a day to surgical eye for 1 week, then 1 drop two times a day to surgical eye for 1 week, then 1 drop once a day to surgical eye for 1 week, then stop  Predforte (pink or white top) 1 drop four times a day to surgical eye for 1 week, then 1 drop three times a day to surgical eye for 1 week, then 1 drop two times a day to surgical eye for 1 week , then 1 drop once a day to surgical eye for 1 week, then stop  OPTIONAL: preservative free artificial tears (refresh, thera tears, systane, etc) 1 drop 4-6 times per day as needed (DO NOT use Visine or Clear Eyes or any eye drop product that states \"red out\")  If on glaucoma medications, then continue regular eye pressure drops  **wait 5-10 minutes between each drop**  **can refrigerate " eye drops to reduce burning or stinging sensation**    Please contact Dr Buck at [phone] if any issues arise    Your follow up appointment is as scheduled on 9/29/2023 at 10:00 AM     [facility] - Department of Ophthalmology  [address] [place], [address]    [facility]  Home Care Following Anesthesia  For 24 hours after surgery:  Get plenty of rest.  A responsible adult must stay with you for at least 24 hours after you leave the surgery center.  Do not drive or use heavy equipment.  If you have weakness or tingling, don't drive or use heavy equipment until this feeling goes away.   Do not drink alcohol.   Avoid strenuous or risky activities.  Ask for help when climbing stairs.  You may feel lightheaded.  IF so, sit for a few minutes before standing.  Have someone help you get up.   If you have nausea (feel sick to your stomach): Drink only clear liquids such as apple juice, ginger ale, broth or 7-Up.  Rest may also help.  Be sure to drink enough fluids.  Move to a regular diet as you feel able.   You may have a slight fever.  Call the doctor if your fever is over 100 F (37.7 C) (taken under the tongue) or lasts longer than 24 hours.  You may have a dry mouth, a sore throat, muscle aches or trouble sleeping. These should go away after 24 hours.  Do not make important or legal decisions.   It is recommended to avoid smoking.               Tips for taking pain medications  To get the best pain relief possible, remember these points:  Take pain medications as directed, before pain becomes severe.  Pain medication can upset your stomach: taking it with food may help.  Constipation is a common side effect of pain medication. Drink plenty of  fluids.  Eat foods high in fiber. Take a stool softener if recommended by your doctor or pharmacist.  Do not drink alcohol, drive or operate machinery while taking pain medications.  Ask about other  ways to control pain, such as with heat, ice or relaxation.    Tylenol/Acetaminophen Consumption    If you feel your pain relief is insufficient, you may take Tylenol/Acetaminophen in addition to your narcotic pain medication.   Be careful not to exceed 4,000 mg of Tylenol/Acetaminophen in a 24 hour period from all sources.  If you are taking extra strength Tylenol/acetaminophen (500 mg), the maximum dose is 8 tablets in 24 hours.  If you are taking regular strength acetaminophen (325 mg), the maximum dose is 12 tablets in 24 hours.  Received 975 mg of Tylenol at 7:00 AM today. Next dose is available at 1:00 PM as needed per package instruction.    Call a doctor for any of the following:  Signs of infection (fever, growing tenderness at the surgery site, a large amount of drainage or bleeding, severe pain, foul-smelling drainage, redness, swelling).  It has been over 8 to 10 hours since surgery and you are still not able to urinate (pass water).  Headache for over 24 hours.  Numbness, tingling or weakness the day after surgery (if you had spinal anesthesia).  Signs of Covid-19 infection (temperature over 100 degrees, shortness of breath, cough, loss of taste/smell, generalized body aches, persistent headache, chills, sore throat, nausea/vomiting/diarrhea)  Your doctor is:       Dr. Jen Buck, Ophthalmology: [phone]               Or dial [phone] and ask for the resident on call for:  Ophthalmology  For emergency care, call the:  [facility]:  [phone] (TTY for hearing impaired: [phone])

## 2023-09-28 NOTE — OP NOTE
PREOPERATIVE DIAGNOSIS:   Nuclear sclerotic cataract of Left eye   POSTOPERATIVE DIAGNOSIS:   Nuclear sclerotic cataract of Left eye  PROCEDURES: Cataract extraction with intraocular lens implant Left eye.  SURGEON: Jen Buck M.D.  ASSISTANT: none  ANESTHESIA: MAC with Topical   CDE: 20.72  INDICATIONS: The patient Alek Gimenez presented to the eye clinic with decreased vision secondary to cataract in the Left eye. The risks, benefits and alternatives to cataract extraction were discussed. The patient elected to proceed. All questions were answered to the patient's satisfaction.     DESCRIPTION OF PROCEDURE: Prior to the procedure, appropriate cardiac and respiratory monitors were applied to the patient.  In the pre-operative holding area, a drop of topical proparacaine 0.5% followed by three rounds of cyclopentolate 1%-tropicamide 1%-phenylephrine 2.5%,  by five minutes apart, were instilled into the procedure eye.  The patient was brought to the operating room where a surgical pause was carried out to identify with all members of the surgical team the correct surgical site.      With adequate anesthesia, the Left eye was prepped and draped in the usual sterile fashion for ophthalmic surgery. A lid speculum was placed, and the operating microscope was rotated into position. The surgeon was seated temporally. A paracentesis was created at the limbus with the surgeon's non dominant hand.  Through this limbal paracentesis, the anterior chamber was filled with preservative-free lidocaine, followed by preservative free epinephrine-BSS, followed by dispersive viscoelastic. Next, the main wound was created via a clear corneal incision with the surgeon's dominant hand using a 2.6 mm keratome blade. A capsulorrhexis was initiated using a 25-gauge bent needle, Cystotome, and a continuous curvilinear capsulorhexis was completed in a circular fashion using the Utrata forceps. Following the capsulorhexis, some  of the OVD was egressed from the main wound, and then the lens nucleus was carefully hydrodissected using balanced salt solution on an AC cannula.  The lens nucleus was rotated and removed using phacoemulsification in a stop and chop technique.  Residual cortical material was removed using irrigation-aspiration.  The capsular bag was then filled with cohesive viscoelastic.  A  +14.5 diopter CC60WF lens was inserted into the capsular bag.  The lens power selected was reviewed using the intraocular lens power measurements that were obtained preoperatively to confirm that the correct lens was selected for the desired post-operative refractive state. After lens insertion, the residual viscoelastic was removed in its entirety with the I/A handpiece. The wounds were hydrated with balanced salt solution and found to be self-sealing. Preservative free intracameral moxifloxacin was administered. With a weck-maricel spear the wounds were checked and no leaks were noted. The intraocular pressure was noted to be within the normal range to digital palpation. Subconjunctival dexamethasone (2mg) was injected superiorly    The lid speculum was removed, one drop of timolol 0.5% instilled in the operative eye. An eye shield was carefully placed over the operative eye. The patient tolerated the procedure well without any complications.    PLAN: The patient will be discharged to home and will follow up for post operative visit as scheduled. Counseled return precautions  EBL:  None  COMPLICATIONS:  None  Implant Name Type Inv. Item Serial No.  Lot No. LRB No. Used Action   LENS CC60WF 14.5 [employer] UV ASPHERIC BICONVEX IOL - [id number] Lens/Eye Implant LENS CC60WF 14.5 CLAREON UV ASPHERIC BICONVEX IOL [id number] [employer]  Left 1 Implanted

## 2023-09-28 NOTE — BRIEF OP NOTE
[facility] [place]    Brief Operative Note    Pre-operative diagnosis: Nuclear sclerotic cataract of left eye  Post-operative diagnosis Same as pre-operative diagnosis    Procedure: LEFT EYE PHACOEMULSIFICATION, CATARACT, WITH INTRAOCULAR LENS IMPLANT, Left - Eye    Surgeon: Surgeon(s) and Role:     * Jen Buck MD - Primary  Anesthesia: MAC with Topical   Estimated Blood Loss: None    Drains: None  Specimens: * No specimens in log *  Findings:   Left eye cataract  Complications: None.  Implants:   Implant Name Type Inv. Item Serial No.  Lot No. LRB No. Used Action   LENS CC60WF 14.5 CLAREON UV ASPHERIC BICONVEX IOL - [id number] Lens/Eye Implant LENS CC60WF 14.5 [employer] UV ASPHERIC BICONVEX IOL [id number] [employer]  Left 1 Implanted

## 2023-09-28 NOTE — ANESTHESIA CARE TRANSFER NOTE
Patient: Alek Gimenez    Procedure: Procedure(s):  LEFT EYE PHACOEMULSIFICATION, CATARACT, WITH INTRAOCULAR LENS IMPLANT       Diagnosis: Nuclear sclerotic cataract of both eyes [H25.13]  Diagnosis Additional Information: No value filed.    Anesthesia Type:   MAC     Note:    Oropharynx: oropharynx clear of all foreign objects and spontaneously breathing  Level of Consciousness: awake  Oxygen Supplementation: room air    Independent Airway: airway patency satisfactory and stable  Dentition: dentition unchanged  Vital Signs Stable: post-procedure vital signs reviewed and stable  Report to RN Given: handoff report given  Patient transferred to: Phase II    Handoff Report: Identifed the Patient, Identified the Reponsible Provider, Reviewed the pertinent medical history, Discussed the surgical course, Reviewed Intra-OP anesthesia mangement and issues during anesthesia, Set expectations for post-procedure period and Allowed opportunity for questions and acknowledgement of understanding      Vitals:  Vitals Value Taken Time   /78 09/28/23 0826   Temp 36.6  C (97.8  F) 09/28/23 0826   Pulse 66 09/28/23 0826   Resp     SpO2 97 % 09/28/23 0826       Electronically Signed By: KRISTA Amador CRNA  September 28, 2023  8:29 AM

## 2023-09-28 NOTE — ANESTHESIA POSTPROCEDURE EVALUATION
Patient: Alek Gimenez    Procedure: Procedure(s):  LEFT EYE PHACOEMULSIFICATION, CATARACT, WITH INTRAOCULAR LENS IMPLANT       Anesthesia Type:  MAC    Note:  Disposition: Outpatient   Postop Pain Control: Uneventful            Sign Out: Well controlled pain   PONV: No   Neuro/Psych: Uneventful            Sign Out: Acceptable/Baseline neuro status   Airway/Respiratory: Uneventful            Sign Out: Acceptable/Baseline resp. status   CV/Hemodynamics: Uneventful            Sign Out: Acceptable CV status; No obvious hypovolemia; No obvious fluid overload   Other NRE: NONE   DID A NON-ROUTINE EVENT OCCUR? No           Last vitals:  Vitals Value Taken Time   /73 09/28/23 0858   Temp 36.4  C (97.6  F) 09/28/23 0858   Pulse 63 09/28/23 0858   Resp 16 09/28/23 0858   SpO2 98 % 09/28/23 0858       Electronically Signed By: Hong Chambers MD  September 28, 2023  11:16 AM

## 2023-09-29 ENCOUNTER — OFFICE VISIT (OUTPATIENT)
Dept: OPHTHALMOLOGY | Facility: CLINIC | Age: 63
End: 2023-09-29
Attending: STUDENT IN AN ORGANIZED HEALTH CARE EDUCATION/TRAINING PROGRAM
Payer: COMMERCIAL

## 2023-09-29 DIAGNOSIS — Z98.890 POSTOPERATIVE EYE STATE: Primary | ICD-10-CM

## 2023-09-29 DIAGNOSIS — Z96.1 PSEUDOPHAKIA, LEFT EYE: ICD-10-CM

## 2023-09-29 PROCEDURE — G0463 HOSPITAL OUTPT CLINIC VISIT: HCPCS | Performed by: STUDENT IN AN ORGANIZED HEALTH CARE EDUCATION/TRAINING PROGRAM

## 2023-09-29 PROCEDURE — 99024 POSTOP FOLLOW-UP VISIT: CPT | Performed by: STUDENT IN AN ORGANIZED HEALTH CARE EDUCATION/TRAINING PROGRAM

## 2023-09-29 ASSESSMENT — SLIT LAMP EXAM - LIDS
COMMENTS: MGD
COMMENTS: MGD

## 2023-09-29 ASSESSMENT — CONF VISUAL FIELD
OD_INFERIOR_NASAL_RESTRICTION: 0
OS_NORMAL: 1
OS_SUPERIOR_NASAL_RESTRICTION: 0
OS_INFERIOR_TEMPORAL_RESTRICTION: 0
METHOD: COUNTING FINGERS
OD_SUPERIOR_NASAL_RESTRICTION: 0
OD_NORMAL: 1
OD_INFERIOR_TEMPORAL_RESTRICTION: 0
OD_SUPERIOR_TEMPORAL_RESTRICTION: 0
OS_SUPERIOR_TEMPORAL_RESTRICTION: 0
OS_INFERIOR_NASAL_RESTRICTION: 0

## 2023-09-29 ASSESSMENT — EXTERNAL EXAM - RIGHT EYE: OD_EXAM: NORMAL

## 2023-09-29 ASSESSMENT — EXTERNAL EXAM - LEFT EYE: OS_EXAM: NORMAL

## 2023-09-29 ASSESSMENT — VISUAL ACUITY
OS_SC: 20/200
OS_SC: 20/20
METHOD: SNELLEN - LINEAR

## 2023-09-29 ASSESSMENT — TONOMETRY
OS_IOP_MMHG: 13
IOP_METHOD: ICARE
OD_IOP_MMHG: 15

## 2023-09-29 NOTE — PROGRESS NOTES
"HPI       Post Op (Ophthalmology) Left Eye    In left eye.  Associated symptoms include Negative for eye pain and headache. Additional comments: Pt here for POD#1 s/p PCIOL left eye DOS 09/28/2023.              Comments    Pt was able to sleep well last night. Denies eye pain or headaches today. Pt notes vision is \"very good.\"   Pt using drops as prescribed:  Moxi  Pred  Ketoralac     ELLIOT Mooney on 9/29/2023 at 10:24 AM            Last edited by Anna Sultana COA on 9/29/2023 10:24 AM.        No new flashes, floaters, or curtain down visual field.    Review of systems for the eyes was negative other than the pertinent positives/negatives listed in the HPI.    Ocular Meds: postop drops as instructed    Ocular Hx: refractive error OU; CE/IOL left eye (9/28/2023)    FOHx: no family history of glaucoma or blindness    PMHx: HLD; mild asthma    SocHx: physician working in pulmonary/critical care; Amateur astronomer    Assessment & Plan      Alek Gimenez is a 63 year old male with the following diagnoses:    Postoperative eye state  Pseudophakia OS  - Doing well, IOP wnl, fernanda negative, happy with results  - Keep eye shield in place at night for 7 days  - post-operative drops and taper according to instructions  -- vigamox QID x 1 week to surgical eye  -- predforte QID/TID/BID/daily/stop to procedure eye, taper weekly  -- ketorolac QID/TID/BID/daily/stop to procedure eye, taper weekly  - Post-operative do's and don'ts reviewed, questions answered  - patient has emergency contact information  - Counseled endophthalmitis/RD/return precautions     Nuclear sclerotic cataract OD  - visually significant and affecting ADLs,  - patient is right dominant  - already scheduled for surgery    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 that resolves at near  - sees Dr Armijo who noted \"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 which is " "essentially an orbital connective tissue degenerative process leading to turning in of the eyes predominately symptomatic at distance\"  - plan is for strabismus surgery after cataract surgery with Dr Armijo  - follow up with Dr Armijo as planned    MGD/Dry Eyes OU  - hold on warm compresses until POM1 after cataract surgery  - PFATs QID and prn OU    Posterior Vitreous Detachment OU  - no new flashes, floaters, or curtain down visual field; previously noted  - refractive status: myope  - prior dilated exam with no holes, tears, or detachment on exam; blair's negative  - counseled RD/return precautions    Refractive error  - hold on refraction until POM1 s/p cataract surgery    Counseled return/RD precautions    Patient disposition:   Return for Follow Up for post op cataract surgery visit, or sooner changes.    Attending Physician Attestation:  Complete documentation of historical and exam elements from today's encounter can be found in the full encounter summary report (not reduplicated in this progress note).  I personally obtained the chief complaint(s) and history of present illness.  I confirmed and edited as necessary the review of systems, past medical/surgical history, family history, social history, and examination findings as documented by others; and I examined the patient myself.  I personally reviewed the relevant tests, images, and reports as documented above.  I formulated and edited as necessary the assessment and plan and discussed the findings and management plan with the patient and family. Today with Alek Gimenez, I reviewed the indications, risks, benefits, and alternatives of the proposed surgical procedure including, but not limited to, failure obtain the desired result  and need for additional surgery, bleeding, infection, loss of vision, loss of the eye, and the remote possibility of permanent damage to any organ system or death with the use of anesthesia.  I provided multiple " opportunities for the questions, answered all questions to the best of my ability, and confirmed that my answers and my discussion were understood.  - Jen Buck MD

## 2023-09-29 NOTE — NURSING NOTE
"Chief Complaints and History of Present Illnesses   Patient presents with    Post Op (Ophthalmology) Left Eye     Pt here for POD#1 s/p PCIOL left eye DOS 09/28/2023.      Chief Complaint(s) and History of Present Illness(es)       Post Op (Ophthalmology) Left Eye              Laterality: left eye    Associated symptoms: Negative for eye pain and headache    Comments: Pt here for POD#1 s/p PCIOL left eye DOS 09/28/2023.               Comments    Pt was able to sleep well last night. Denies eye pain or headaches today. Pt notes vision is \"very good.\"   Pt using drops as prescribed:  Moxi  Pred  Ketoralac     ELLIOT Mooney on 9/29/2023 at 10:24 AM                     "

## 2023-10-06 ENCOUNTER — OFFICE VISIT (OUTPATIENT)
Dept: OPHTHALMOLOGY | Facility: CLINIC | Age: 63
End: 2023-10-06
Attending: STUDENT IN AN ORGANIZED HEALTH CARE EDUCATION/TRAINING PROGRAM
Payer: COMMERCIAL

## 2023-10-06 DIAGNOSIS — Z96.1 PSEUDOPHAKIA, LEFT EYE: ICD-10-CM

## 2023-10-06 DIAGNOSIS — Z98.890 POSTOPERATIVE EYE STATE: Primary | ICD-10-CM

## 2023-10-06 DIAGNOSIS — H25.11 NUCLEAR SCLEROTIC CATARACT OF RIGHT EYE: ICD-10-CM

## 2023-10-06 PROCEDURE — 99024 POSTOP FOLLOW-UP VISIT: CPT | Performed by: STUDENT IN AN ORGANIZED HEALTH CARE EDUCATION/TRAINING PROGRAM

## 2023-10-06 PROCEDURE — 99211 OFF/OP EST MAY X REQ PHY/QHP: CPT | Performed by: STUDENT IN AN ORGANIZED HEALTH CARE EDUCATION/TRAINING PROGRAM

## 2023-10-06 ASSESSMENT — VISUAL ACUITY
OD_CC+: -2
OS_SC: 20/25
METHOD: SNELLEN - LINEAR
METHOD_MR: DIAGNOSTIC ONLY
OD_CC: 20/50
OD_PH_CC: 20/30
OS_SC+: -1

## 2023-10-06 ASSESSMENT — SLIT LAMP EXAM - LIDS
COMMENTS: MGD
COMMENTS: MGD

## 2023-10-06 ASSESSMENT — REFRACTION_MANIFEST
OS_SPHERE: -0.50
OS_CYLINDER: SPHERE

## 2023-10-06 ASSESSMENT — TONOMETRY
OS_IOP_MMHG: 13
IOP_METHOD: TONOPEN
OD_IOP_MMHG: 13

## 2023-10-06 ASSESSMENT — EXTERNAL EXAM - RIGHT EYE: OD_EXAM: NORMAL

## 2023-10-06 ASSESSMENT — EXTERNAL EXAM - LEFT EYE: OS_EXAM: NORMAL

## 2023-10-06 NOTE — NURSING NOTE
Chief Complaints and History of Present Illnesses   Patient presents with    Post Op (Ophthalmology) Left Eye     Chief Complaint(s) and History of Present Illness(es)       Post Op (Ophthalmology) Left Eye              Laterality: left eye    Associated symptoms: Negative for photophobia, flashes, floaters, itching and burning    Pain scale: 0/10              Comments    Alek is here one week post Left cataract surgery with IOL implant. He states LE is doing very well.     Austen Wright COT 2:38 PM October 6, 2023

## 2023-10-06 NOTE — PROGRESS NOTES
"HPI       Post Op (Ophthalmology) Left Eye    In left eye.  Associated symptoms include Negative for photophobia, flashes, floaters, itching and burning.  Pain was noted as 0/10.             Comments    Alek is here one week post Left cataract surgery with IOL implant. He states LE is doing very well.     Austen Wright COT 2:38 PM October 6, 2023             Last edited by Austen Wright on 10/6/2023  2:38 PM.        No new flashes, floaters, or curtain down visual field.    Review of systems for the eyes was negative other than the pertinent positives/negatives listed in the HPI.    Ocular Meds: postop drops as instructed    Ocular Hx: refractive error OU; CE/IOL left eye (9/28/2023)    FOHx: no family history of glaucoma or blindness    PMHx: HLD; mild asthma    SocHx: physician working in pulmonary/critical care; Amateur astronomer    Assessment & Plan      Alek Gimenez is a 63 year old male with the following diagnoses:    Postoperative eye state  Pseudophakia OS  - Doing well, IOP wnl, fernanda negative, happy with results  - discontinue eye shield  - post-operative drops and taper according to instructions  -- discontinue vigamox  -- predforte TID/BID/daily/stop to procedure eye, taper weekly  -- ketorolac TID/BID/daily/stop to procedure eye, taper weekly  - Post-operative do's and don'ts reviewed, questions answered  - patient has emergency contact information  - Counseled endophthalmitis/RD/return precautions     Nuclear sclerotic cataract OD  - visually significant and affecting ADLs,  - patient is right dominant  - already scheduled for surgery    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 that resolves at near  - sees Dr Armijo who noted \"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 leading to turning in of the eyes predominately symptomatic at distance\"  - plan is for " strabismus surgery after cataract surgery with Dr Armijo  - follow up with Dr Armijo as planned    MGD/Dry Eyes OU  - hold on warm compresses until POM1 after cataract surgery  - PFATs QID and prn OU    Posterior Vitreous Detachment OU  - no new flashes, floaters, or curtain down visual field; previously noted  - refractive status: myope  - prior dilated exam with no holes, tears, or detachment on exam; blair's negative  - counseled RD/return precautions    Refractive error  - hold on refraction until POM1 s/p cataract surgery    Counseled return/RD precautions    Patient disposition:   Return for Follow Up for post op cataract surgery visit, or sooner changes.    Attending Physician Attestation:  Complete documentation of historical and exam elements from today's encounter can be found in the full encounter summary report (not reduplicated in this progress note).  I personally obtained the chief complaint(s) and history of present illness.  I confirmed and edited as necessary the review of systems, past medical/surgical history, family history, social history, and examination findings as documented by others; and I examined the patient myself.  I personally reviewed the relevant tests, images, and reports as documented above.  I formulated and edited as necessary the assessment and plan and discussed the findings and management plan with the patient and family. Today with Alek Gimenez, I reviewed the indications, risks, benefits, and alternatives of the proposed surgical procedure including, but not limited to, failure obtain the desired result  and need for additional surgery, bleeding, infection, loss of vision, loss of the eye, and the remote possibility of permanent damage to any organ system or death with the use of anesthesia.  I provided multiple opportunities for the questions, answered all questions to the best of my ability, and confirmed that my answers and my discussion were understood.  -  Jne Buck MD

## 2023-10-11 ENCOUNTER — ANESTHESIA EVENT (OUTPATIENT)
Dept: SURGERY | Facility: AMBULATORY SURGERY CENTER | Age: 63
End: 2023-10-11
Payer: COMMERCIAL

## 2023-10-12 ENCOUNTER — HOSPITAL ENCOUNTER (OUTPATIENT)
Facility: AMBULATORY SURGERY CENTER | Age: 63
Discharge: HOME | End: 2023-10-12
Attending: STUDENT IN AN ORGANIZED HEALTH CARE EDUCATION/TRAINING PROGRAM
Payer: COMMERCIAL

## 2023-10-12 ENCOUNTER — ANESTHESIA (OUTPATIENT)
Dept: SURGERY | Facility: AMBULATORY SURGERY CENTER | Age: 63
End: 2023-10-12
Payer: COMMERCIAL

## 2023-10-12 VITALS
SYSTOLIC BLOOD PRESSURE: 112 MMHG | RESPIRATION RATE: 16 BRPM | BODY MASS INDEX: 29.4 KG/M2 | HEIGHT: 71 IN | OXYGEN SATURATION: 98 % | WEIGHT: 210 LBS | HEART RATE: 58 BPM | DIASTOLIC BLOOD PRESSURE: 71 MMHG | TEMPERATURE: 97.6 F

## 2023-10-12 DIAGNOSIS — H25.13 NUCLEAR SCLEROTIC CATARACT OF BOTH EYES: Primary | ICD-10-CM

## 2023-10-12 DIAGNOSIS — J45.20 MILD INTERMITTENT REACTIVE AIRWAY DISEASE WITHOUT COMPLICATION: ICD-10-CM

## 2023-10-12 PROCEDURE — 66984 XCAPSL CTRC RMVL W/O ECP: CPT | Mod: RT

## 2023-10-12 PROCEDURE — 66984 XCAPSL CTRC RMVL W/O ECP: CPT | Mod: 79 | Performed by: STUDENT IN AN ORGANIZED HEALTH CARE EDUCATION/TRAINING PROGRAM

## 2023-10-12 DEVICE — LENS CC60WF 14.5 CLAREON UV ASPHERIC BICONVEX IOL: Type: IMPLANTABLE DEVICE | Site: EYE | Status: FUNCTIONAL

## 2023-10-12 RX ORDER — ONDANSETRON 4 MG/1
4 TABLET, ORALLY DISINTEGRATING ORAL EVERY 30 MIN PRN
Status: DISCONTINUED | OUTPATIENT
Start: 2023-10-12 | End: 2023-10-13 | Stop reason: HOSPADM

## 2023-10-12 RX ORDER — TETRACAINE HYDROCHLORIDE 5 MG/ML
SOLUTION OPHTHALMIC PRN
Status: DISCONTINUED | OUTPATIENT
Start: 2023-10-12 | End: 2023-10-12 | Stop reason: HOSPADM

## 2023-10-12 RX ORDER — OXYCODONE HYDROCHLORIDE 5 MG/1
5 TABLET ORAL
Status: DISCONTINUED | OUTPATIENT
Start: 2023-10-12 | End: 2023-10-13 | Stop reason: HOSPADM

## 2023-10-12 RX ORDER — FENTANYL CITRATE 50 UG/ML
INJECTION, SOLUTION INTRAMUSCULAR; INTRAVENOUS PRN
Status: DISCONTINUED | OUTPATIENT
Start: 2023-10-12 | End: 2023-10-12

## 2023-10-12 RX ORDER — LIDOCAINE HYDROCHLORIDE 10 MG/ML
INJECTION, SOLUTION EPIDURAL; INFILTRATION; INTRACAUDAL; PERINEURAL PRN
Status: DISCONTINUED | OUTPATIENT
Start: 2023-10-12 | End: 2023-10-12 | Stop reason: HOSPADM

## 2023-10-12 RX ORDER — MOXIFLOXACIN 5 MG/ML
1 SOLUTION/ DROPS OPHTHALMIC
Status: COMPLETED | OUTPATIENT
Start: 2023-10-12 | End: 2023-10-12

## 2023-10-12 RX ORDER — SODIUM CHLORIDE, SODIUM LACTATE, POTASSIUM CHLORIDE, CALCIUM CHLORIDE 600; 310; 30; 20 MG/100ML; MG/100ML; MG/100ML; MG/100ML
INJECTION, SOLUTION INTRAVENOUS CONTINUOUS
Status: DISCONTINUED | OUTPATIENT
Start: 2023-10-12 | End: 2023-10-13 | Stop reason: HOSPADM

## 2023-10-12 RX ORDER — ONDANSETRON 2 MG/ML
4 INJECTION INTRAMUSCULAR; INTRAVENOUS EVERY 30 MIN PRN
Status: DISCONTINUED | OUTPATIENT
Start: 2023-10-12 | End: 2023-10-13 | Stop reason: HOSPADM

## 2023-10-12 RX ORDER — DICLOFENAC SODIUM 1 MG/ML
1 SOLUTION/ DROPS OPHTHALMIC
Status: COMPLETED | OUTPATIENT
Start: 2023-10-12 | End: 2023-10-12

## 2023-10-12 RX ORDER — ACETAMINOPHEN 325 MG/1
975 TABLET ORAL ONCE
Status: COMPLETED | OUTPATIENT
Start: 2023-10-12 | End: 2023-10-12

## 2023-10-12 RX ORDER — BALANCED SALT SOLUTION 6.4; .75; .48; .3; 3.9; 1.7 MG/ML; MG/ML; MG/ML; MG/ML; MG/ML; MG/ML
SOLUTION OPHTHALMIC PRN
Status: DISCONTINUED | OUTPATIENT
Start: 2023-10-12 | End: 2023-10-12 | Stop reason: HOSPADM

## 2023-10-12 RX ORDER — KETOROLAC TROMETHAMINE 5 MG/ML
SOLUTION OPHTHALMIC
Qty: 5 ML | Refills: 0 | Status: SHIPPED | OUTPATIENT
Start: 2023-10-12 | End: 2023-11-09

## 2023-10-12 RX ORDER — PREDNISOLONE ACETATE 10 MG/ML
SUSPENSION/ DROPS OPHTHALMIC
Qty: 5 ML | Refills: 0 | Status: SHIPPED | OUTPATIENT
Start: 2023-10-12 | End: 2023-11-09

## 2023-10-12 RX ORDER — MOXIFLOXACIN 5 MG/ML
1 SOLUTION/ DROPS OPHTHALMIC 4 TIMES DAILY
Qty: 3 ML | Refills: 0 | Status: SHIPPED | OUTPATIENT
Start: 2023-10-12

## 2023-10-12 RX ORDER — MOXIFLOXACIN IN NACL,ISO-OS/PF 0.3MG/0.3
SYRINGE (ML) INTRAOCULAR PRN
Status: DISCONTINUED | OUTPATIENT
Start: 2023-10-12 | End: 2023-10-12 | Stop reason: HOSPADM

## 2023-10-12 RX ORDER — LIDOCAINE 40 MG/G
CREAM TOPICAL
Status: DISCONTINUED | OUTPATIENT
Start: 2023-10-12 | End: 2023-10-12 | Stop reason: HOSPADM

## 2023-10-12 RX ORDER — CYCLOPENTOLAT/TROPIC/PHENYLEPH 1%-1%-2.5%
1 DROPS (EA) OPHTHALMIC (EYE)
Status: COMPLETED | OUTPATIENT
Start: 2023-10-12 | End: 2023-10-12

## 2023-10-12 RX ORDER — PROPARACAINE HYDROCHLORIDE 5 MG/ML
1 SOLUTION/ DROPS OPHTHALMIC ONCE
Status: COMPLETED | OUTPATIENT
Start: 2023-10-12 | End: 2023-10-12

## 2023-10-12 RX ORDER — TIMOLOL MALEATE 5 MG/ML
SOLUTION/ DROPS OPHTHALMIC PRN
Status: DISCONTINUED | OUTPATIENT
Start: 2023-10-12 | End: 2023-10-12 | Stop reason: HOSPADM

## 2023-10-12 RX ORDER — OXYCODONE HYDROCHLORIDE 5 MG/1
10 TABLET ORAL
Status: DISCONTINUED | OUTPATIENT
Start: 2023-10-12 | End: 2023-10-13 | Stop reason: HOSPADM

## 2023-10-12 RX ORDER — SODIUM CHLORIDE, SODIUM LACTATE, POTASSIUM CHLORIDE, CALCIUM CHLORIDE 600; 310; 30; 20 MG/100ML; MG/100ML; MG/100ML; MG/100ML
INJECTION, SOLUTION INTRAVENOUS CONTINUOUS
Status: DISCONTINUED | OUTPATIENT
Start: 2023-10-12 | End: 2023-10-12 | Stop reason: HOSPADM

## 2023-10-12 RX ORDER — SODIUM CHLORIDE, SODIUM LACTATE, POTASSIUM CHLORIDE, CALCIUM CHLORIDE 600; 310; 30; 20 MG/100ML; MG/100ML; MG/100ML; MG/100ML
INJECTION, SOLUTION INTRAVENOUS CONTINUOUS PRN
Status: DISCONTINUED | OUTPATIENT
Start: 2023-10-12 | End: 2023-10-12

## 2023-10-12 RX ADMIN — MOXIFLOXACIN 1 DROP: 5 SOLUTION/ DROPS OPHTHALMIC at 11:09

## 2023-10-12 RX ADMIN — FENTANYL CITRATE 50 MCG: 50 INJECTION, SOLUTION INTRAMUSCULAR; INTRAVENOUS at 12:05

## 2023-10-12 RX ADMIN — Medication 1 DROP: at 11:21

## 2023-10-12 RX ADMIN — MOXIFLOXACIN 1 DROP: 5 SOLUTION/ DROPS OPHTHALMIC at 11:20

## 2023-10-12 RX ADMIN — FENTANYL CITRATE 50 MCG: 50 INJECTION, SOLUTION INTRAMUSCULAR; INTRAVENOUS at 12:15

## 2023-10-12 RX ADMIN — Medication 1 DROP: at 11:14

## 2023-10-12 RX ADMIN — SODIUM CHLORIDE, SODIUM LACTATE, POTASSIUM CHLORIDE, CALCIUM CHLORIDE: 600; 310; 30; 20 INJECTION, SOLUTION INTRAVENOUS at 12:01

## 2023-10-12 RX ADMIN — PROPARACAINE HYDROCHLORIDE 1 DROP: 5 SOLUTION/ DROPS OPHTHALMIC at 11:07

## 2023-10-12 RX ADMIN — MOXIFLOXACIN 1 DROP: 5 SOLUTION/ DROPS OPHTHALMIC at 11:14

## 2023-10-12 RX ADMIN — DICLOFENAC SODIUM 1 DROP: 1 SOLUTION/ DROPS OPHTHALMIC at 11:14

## 2023-10-12 RX ADMIN — DICLOFENAC SODIUM 1 DROP: 1 SOLUTION/ DROPS OPHTHALMIC at 11:21

## 2023-10-12 RX ADMIN — DICLOFENAC SODIUM 1 DROP: 1 SOLUTION/ DROPS OPHTHALMIC at 11:08

## 2023-10-12 RX ADMIN — Medication 1 DROP: at 11:10

## 2023-10-12 RX ADMIN — ACETAMINOPHEN 975 MG: 325 TABLET ORAL at 11:06

## 2023-10-12 NOTE — OP NOTE
PREOPERATIVE DIAGNOSIS:   Nuclear sclerotic cataract of Right eye   POSTOPERATIVE DIAGNOSIS:   Nuclear sclerotic cataract of Right eye  PROCEDURES: Cataract extraction with intraocular lens implant Right eye.  SURGEON: Jen Buck M.D.  ASSISTANT:ALEKSEY Davis - observer  ANESTHESIA: MAC with Topical   CDE: 23.02  INDICATIONS: The patient Alek Gimenez presented to the eye clinic with decreased vision secondary to cataract in the Right eye. The risks, benefits and alternatives to cataract extraction were discussed. The patient elected to proceed. All questions were answered to the patient's satisfaction.     DESCRIPTION OF PROCEDURE: Prior to the procedure, appropriate cardiac and respiratory monitors were applied to the patient.  In the pre-operative holding area, a drop of topical proparacaine 0.5% followed by three rounds of cyclopentolate 1%-tropicamide 1%-phenylephrine 2.5%,  by five minutes apart, were instilled into the procedure eye.  The patient was brought to the operating room where a surgical pause was carried out to identify with all members of the surgical team the correct surgical site.      With adequate anesthesia, the Right eye was prepped and draped in the usual sterile fashion for ophthalmic surgery. A lid speculum was placed, and the operating microscope was rotated into position. The surgeon was seated temporally. A paracentesis was created at the limbus with the surgeon's non dominant hand.  Through this limbal paracentesis, the anterior chamber was filled with preservative-free lidocaine, followed by preservative free epinephrine-BSS, followed by dispersive viscoelastic. Next, the main wound was created via a clear corneal incision with the surgeon's dominant hand using a 2.6 mm keratome blade. A capsulorrhexis was initiated using a 25-gauge bent needle, Cystotome, and a continuous curvilinear capsulorhexis was completed in a circular fashion using the Utrata forceps.  Following the capsulorhexis, some of the OVD was egressed from the main wound, and then the lens nucleus was carefully hydrodissected using balanced salt solution on an AC cannula.  The lens nucleus was rotated and removed using phacoemulsification in a stop and chop technique.  Residual cortical material was removed using irrigation-aspiration.  The capsular bag was then filled with cohesive viscoelastic.  A +14.5 diopter CC60WF lens was inserted into the capsular bag.  The lens power selected was reviewed using the intraocular lens power measurements that were obtained preoperatively to confirm that the correct lens was selected for the desired post-operative refractive state. After lens insertion, the residual viscoelastic was removed in its entirety with the I/A handpiece. The wounds were hydrated with balanced salt solution and found to be self-sealing. Preservative free intracameral moxifloxacin was administered. With a weck-maricel spear the wounds were checked and no leaks were noted. The intraocular pressure was noted to be within the normal range to digital palpation. Subconjunctival dexamethasone (2mg) was injected inferiorly.    The lid speculum was removed, one drop of timolol 0.5% was instilled in the operative eye. A patch and eye shield were carefully placed over the operative eye. The patient tolerated the procedure well without any complications.    PLAN: The patient will be discharged to home and will follow up for post operative visit as scheduled. Counseled return precautions  EBL:  None  COMPLICATIONS:  None  Implant Name Type Inv. Item Serial No.  Lot No. LRB No. Used Action   LENS CC60WF 14.5 CLARE[employer] UV ASPHERIC BICONVEX IOL - [id number] Lens/Eye Implant LENS CC60WF 14.5 CLAREON UV ASPHERIC BICONVEX IOL [id number] [employer]  Right 1 Implanted

## 2023-10-12 NOTE — ANESTHESIA CARE TRANSFER NOTE
Patient: Alek Gimenez    Procedure: Procedure(s):  RIGHT EYE PHACOEMULSIFICATION, CATARACT, WITH INTRAOCULAR LENS IMPLANT       Diagnosis: Nuclear sclerotic cataract of both eyes [H25.13]  Diagnosis Additional Information: No value filed.    Anesthesia Type:   MAC     Note:    Oropharynx: spontaneously breathing  Level of Consciousness: awake  Oxygen Supplementation: room air    Independent Airway: airway patency satisfactory and stable  Dentition: dentition unchanged  Vital Signs Stable: post-procedure vital signs reviewed and stable  Report to RN Given: handoff report given  Patient transferred to: Phase II    Handoff Report: Identifed the Patient, Identified the Reponsible Provider, Reviewed the pertinent medical history, Discussed the surgical course, Reviewed Intra-OP anesthesia mangement and issues during anesthesia, Set expectations for post-procedure period and Allowed opportunity for questions and acknowledgement of understanding      Vitals:  Vitals Value Taken Time   /72 10/12/23 1243   Temp 36.4  C (97.6  F) 10/12/23 1243   Pulse 60 10/12/23 1243   Resp 16 10/12/23 1243   SpO2 97 % 10/12/23 1243       Electronically Signed By: KRISTA Jimenez CRNA  October 12, 2023  12:47 PM

## 2023-10-12 NOTE — DISCHARGE INSTRUCTIONS
"Cataract Surgery Post-Operative Instructions      You have undergone cataract surgery today. Take it easy as the mild anesthesia wears off.     After cataract surgery you will have an eye shield over your eye and things might be a little blurry. This is normal. It will clear up over the coming days. Your eye may feel a little scratchy and this should get better over the next few days    It is okay to resume your previous diet    Use Tylenol (if there is no contraindication from a medical standpoint) if you experience any eye pain    Avoid sleeping on or putting pressure on the operated eye    Sleep with the eye shield at bedtime for the first week    You may resume light activity tomorrow, but no heavy lifting, no straining, no bending over especially the first week    Do not rub the eyes, no water in the eyes (no pools or hot tubs or tap water); Please wait until tomorrow to shower, and when you do shower take care not to get water in the surgical eye    You can continue the following eye drops starting tonight:    Vigamox (tan cap) 1 drop four times a day to surgical eye for 1 week, then stop  Ketorolac (grey top) 1 drop four times a day to surgical eye for 1 week, then 1 drop three times a day to surgical eye for 1 week, then 1 drop two times a day to surgical eye for 1 week , then 1 drop once a day to surgical eye for 1 week, then stop  Predforte (pink or white top) 1 drop four times a day to surgical eye for 1 week, then 1 drop three times a day to surgical eye for 1 week, then 1 drop two times a day to surgical eye for 1 week , then 1 drop once a day to surgical eye for 1 week, then stop  OPTIONAL: preservative free artificial tears (refresh, thera tears, systane, etc) 1 drop 4-6 times per day as needed (DO NOT use Visine or Clear Eyes or any eye drop product that states \"red out\")  If on glaucoma medications, then continue regular eye pressure drops  **wait 5-10 minutes between each drop**  **can refrigerate " eye drops to reduce burning or stinging sensation**    Please contact Dr Buck at [phone] any issues arise    Your follow up appointment is as scheduled on 10/13/2023 3:00 PM     [facility] - Department of Ophthalmology  [address] [place], [address]          [facility]  Home Care Following Anesthesia  For 24 hours after surgery:  Get plenty of rest.  A responsible adult must stay with you for at least 24 hours after you leave the surgery center.  Do not drive or use heavy equipment.  If you have weakness or tingling, don't drive or use heavy equipment until this feeling goes away.   Do not drink alcohol.   Avoid strenuous or risky activities.  Ask for help when climbing stairs.  You may feel lightheaded.  IF so, sit for a few minutes before standing.  Have someone help you get up.   If you have nausea (feel sick to your stomach): Drink only clear liquids such as apple juice, ginger ale, broth or 7-Up.  Rest may also help.  Be sure to drink enough fluids.  Move to a regular diet as you feel able.   You may have a slight fever.  Call the doctor if your fever is over 100 F (37.7 C) (taken under the tongue) or lasts longer than 24 hours.  You may have a dry mouth, a sore throat, muscle aches or trouble sleeping. These should go away after 24 hours.  Do not make important or legal decisions.   It is recommended to avoid smoking.               Tips for taking pain medications  To get the best pain relief possible, remember these points:  Take pain medications as directed, before pain becomes severe.  Pain medication can upset your stomach: taking it with food may help.  Constipation is a common side effect of pain medication. Drink plenty of  fluids.  Eat foods high in fiber. Take a stool softener if recommended by your doctor or pharmacist.  Do not drink alcohol, drive or operate machinery while taking pain medications.  Ask about other ways to  control pain, such as with heat, ice or relaxation.    Tylenol/Acetaminophen Consumption    If you feel your pain relief is insufficient, you may take Tylenol/Acetaminophen in addition to your narcotic pain medication.   Be careful not to exceed 4,000 mg of Tylenol/Acetaminophen in a 24 hour period from all sources.  If you are taking extra strength Tylenol/acetaminophen (500 mg), the maximum dose is 8 tablets in 24 hours.  If you are taking regular strength acetaminophen (325 mg), the maximum dose is 12 tablets in 24 hours.    Call a doctor for any of the following:  Signs of infection (fever, growing tenderness at the surgery site, a large amount of drainage or bleeding, severe pain, foul-smelling drainage, redness, swelling).  It has been over 8 to 10 hours since surgery and you are still not able to urinate (pass water).  Headache for over 24 hours.  Numbness, tingling or weakness the day after surgery (if you had spinal anesthesia).  Signs of Covid-19 infection (temperature over 100 degrees, shortness of breath, cough, loss of taste/smell, generalized body aches, persistent headache, chills, sore throat, nausea/vomiting/diarrhea)  Your doctor is:       Dr. Jen Buck, Ophthalmology: [phone]               Or dial [phone] and ask for the resident on call for:  Ophthalmology  For emergency care, call the:  [facility]:  [phone] (TTY for hearing impaired: [phone])              2 2

## 2023-10-12 NOTE — BRIEF OP NOTE
[facility]    Brief Operative Note    Pre-operative diagnosis: Nuclear sclerotic cataract of right eye  Post-operative diagnosis Same as pre-operative diagnosis    Procedure: RIGHT EYE PHACOEMULSIFICATION, CATARACT, WITH INTRAOCULAR LENS IMPLANT, Right - Eye    Surgeon: Surgeon(s) and Role:     * Jen Buck MD - Primary  Anesthesia: MAC with Topical   Estimated Blood Loss: None    Drains: None  Specimens: * No specimens in log *  Findings:   None.  Complications: None.  Implants:   Implant Name Type Inv. Item Serial No.  Lot No. LRB No. Used Action   LENS CC60WF 14.5 CLAREON UV ASPHERIC BICONVEX IOL - [id number] Lens/Eye Implant LENS CC60WF 14.5 [employer] UV ASPHERIC BICONVEX IOL [id number] [employer]  Right 1 Implanted

## 2023-10-12 NOTE — ANESTHESIA POSTPROCEDURE EVALUATION
Patient: Alek Gimenez    Procedure: Procedure(s):  RIGHT EYE PHACOEMULSIFICATION, CATARACT, WITH INTRAOCULAR LENS IMPLANT       Anesthesia Type:  MAC    Note:  Disposition: Outpatient   Postop Pain Control: Uneventful            Sign Out: Well controlled pain   PONV: No   Neuro/Psych: Uneventful            Sign Out: Acceptable/Baseline neuro status   Airway/Respiratory: Uneventful            Sign Out: Acceptable/Baseline resp. status   CV/Hemodynamics: Uneventful            Sign Out: Acceptable CV status; No obvious hypovolemia; No obvious fluid overload   Other NRE: NONE   DID A NON-ROUTINE EVENT OCCUR? No           Last vitals:  Vitals Value Taken Time   /71 10/12/23 1301   Temp 36.4  C (97.6  F) 10/12/23 1243   Pulse 58 10/12/23 1301   Resp 16 10/12/23 1301   SpO2 98 % 10/12/23 1301       Electronically Signed By: BRANDI ALLRED MD  October 12, 2023  2:29 PM

## 2023-10-12 NOTE — ANESTHESIA PREPROCEDURE EVALUATION
Anesthesia Pre-Procedure Evaluation    Patient: Alek Gimenez   MRN: [id number] : 1960        Procedure : Procedure(s):  RIGHT EYE PHACOEMULSIFICATION, CATARACT, WITH INTRAOCULAR LENS IMPLANT          Past Medical History:   Diagnosis Date    Hyperlipidemia, unspecified hyperlipidemia type 2019    Mild intermittent reactive airway disease without complication 3/30/2023    Upper GI bleeding 2019      Past Surgical History:   Procedure Laterality Date    CATARACT IOL, RT/LT      ESOPHAGOSCOPY, GASTROSCOPY, DUODENOSCOPY (EGD), COMBINED N/A 2019    Procedure: Esophagogastroduodenoscopy, With Biopsy;  Surgeon: Дмитрий Peña MD;  Location:  GI    PHACOEMULSIFICATION CLEAR CORNEA WITH STANDARD INTRAOCULAR LENS IMPLANT Left 2023    Procedure: LEFT EYE PHACOEMULSIFICATION, CATARACT, WITH INTRAOCULAR LENS IMPLANT;  Surgeon: Jen Buck MD;  Location: [facility] OR      No Known Allergies   Social History     Tobacco Use    Smoking status: Never    Smokeless tobacco: Never   Substance Use Topics    Alcohol use: Yes     Comment: 1 drink per month      Wt Readings from Last 1 Encounters:   10/12/23 95.3 kg (210 lb)        Anesthesia Evaluation            ROS/MED HX  ENT/Pulmonary:     (+)                     asthma                  Neurologic:       Cardiovascular:       METS/Exercise Tolerance:     Hematologic:       Musculoskeletal:       GI/Hepatic:       Renal/Genitourinary:       Endo:       Psychiatric/Substance Use:       Infectious Disease:       Malignancy:       Other:            Physical Exam    Airway        Mallampati: II       Respiratory Devices and Support         Dental           Cardiovascular          Rhythm and rate: regular and normal     Pulmonary                   OUTSIDE LABS:  CBC:   Lab Results   Component Value Date    WBC 9.2 2023    WBC 8.2 2021    HGB 15.4 2023    HGB 14.7 2021    HCT 46.2 2023    HCT 43.3 2021    PLT  "226 03/30/2023     05/19/2021     BMP:   Lab Results   Component Value Date     03/30/2023     05/19/2021    POTASSIUM 4.6 03/30/2023    POTASSIUM 4.2 05/19/2021    CHLORIDE 103 03/30/2023    CHLORIDE 105 05/19/2021    CO2 24 03/30/2023    CO2 27 05/19/2021    BUN 19.6 03/30/2023    BUN 17 05/19/2021    CR 1.18 (H) 03/30/2023    CR 1.01 05/19/2021    GLC 95 03/30/2023    GLC 94 05/19/2021     COAGS: No results found for: \"PTT\", \"INR\", \"FIBR\"  POC: No results found for: \"BGM\", \"HCG\", \"HCGS\"  HEPATIC:   Lab Results   Component Value Date    ALBUMIN 4.6 03/30/2023    PROTTOTAL 7.6 03/30/2023    ALT 36 03/30/2023    AST 24 03/30/2023    ALKPHOS 115 03/30/2023    BILITOTAL 0.4 03/30/2023     OTHER:   Lab Results   Component Value Date    A1C 5.3 12/29/2019    MARIA ESTHER 9.4 03/30/2023    TSH 2.34 08/30/2005       Anesthesia Plan    ASA Status:  2       Anesthesia Type: MAC.              Consents    Anesthesia Plan(s) and associated risks, benefits, and realistic alternatives discussed. Questions answered and patient/representative(s) expressed understanding.     - Discussed:     - Discussed with:  Patient            Postoperative Care    Pain management: Multi-modal analgesia.        Comments:                BRANDI ALLRED MD  "

## 2023-10-13 ENCOUNTER — OFFICE VISIT (OUTPATIENT)
Dept: OPHTHALMOLOGY | Facility: CLINIC | Age: 63
End: 2023-10-13
Attending: STUDENT IN AN ORGANIZED HEALTH CARE EDUCATION/TRAINING PROGRAM
Payer: COMMERCIAL

## 2023-10-13 DIAGNOSIS — Z96.1 PSEUDOPHAKIA, BOTH EYES: ICD-10-CM

## 2023-10-13 DIAGNOSIS — Z98.890 POSTOPERATIVE EYE STATE: Primary | ICD-10-CM

## 2023-10-13 PROCEDURE — 99214 OFFICE O/P EST MOD 30 MIN: CPT | Performed by: STUDENT IN AN ORGANIZED HEALTH CARE EDUCATION/TRAINING PROGRAM

## 2023-10-13 PROCEDURE — 99024 POSTOP FOLLOW-UP VISIT: CPT | Performed by: STUDENT IN AN ORGANIZED HEALTH CARE EDUCATION/TRAINING PROGRAM

## 2023-10-13 ASSESSMENT — VISUAL ACUITY
METHOD: SNELLEN - LINEAR
OD_PH_SC: 20/30
OD_PH_SC+: -1
OD_SC+: -2
OS_SC: 20/25
OD_SC: 20/70

## 2023-10-13 ASSESSMENT — TONOMETRY
IOP_METHOD: TONOPEN
OS_IOP_MMHG: 13
OD_IOP_MMHG: 14

## 2023-10-13 ASSESSMENT — SLIT LAMP EXAM - LIDS
COMMENTS: MGD
COMMENTS: MGD

## 2023-10-13 ASSESSMENT — EXTERNAL EXAM - LEFT EYE: OS_EXAM: NORMAL

## 2023-10-13 ASSESSMENT — EXTERNAL EXAM - RIGHT EYE: OD_EXAM: NORMAL

## 2023-10-13 NOTE — NURSING NOTE
Chief Complaints and History of Present Illnesses   Patient presents with    Post Op (Ophthalmology) Both Eyes     Cataract extraction with IOL in the right eye on 10/12/2023   Cataract extraction with IOL in the left eye on 09/28/2023        Chief Complaint(s) and History of Present Illness(es)       Post Op (Ophthalmology) Both Eyes              Laterality: both eyes    Associated symptoms: Negative for dryness, eye pain, flashes and floaters    Treatments tried: eye drops    Pain scale: 0/10    Comments: Cataract extraction with IOL in the right eye on 10/12/2023   Cataract extraction with IOL in the left eye on 09/28/2023                 Comments    Patient returns to clinic for a one day post operative right eye exam.  Patient denies having any eye discomfort.    Tere Ames, COT 3:04 PM  October 13, 2023

## 2023-10-13 NOTE — NURSING NOTE
Chief Complaints and History of Present Illnesses   Patient presents with    Post Op (Ophthalmology) Right Eye     Cataract extraction with IOL in the right eye on 10/12/2023        Chief Complaint(s) and History of Present Illness(es)       Post Op (Ophthalmology) Right Eye              Laterality: right eye    Associated symptoms: Negative for dryness, eye pain, flashes and floaters    Treatments tried: eye drops    Pain scale: 0/10    Comments: Cataract extraction with IOL in the right eye on 10/12/2023                 Comments    Patient returns to clinic for a one day post operative right eye exam.  Patient denies having any eye discomfort.    Tere Ames, COT 3:04 PM  October 13, 2023

## 2023-10-13 NOTE — PROGRESS NOTES
HPI       Post Op (Ophthalmology) Both Eyes    In both eyes.  Associated symptoms include Negative for dryness, eye pain, flashes and floaters.  Treatments tried include eye drops.  Pain was noted as 0/10. Additional comments: Cataract extraction with IOL in the right eye on 10/12/2023   Cataract extraction with IOL in the left eye on 09/28/2023                Comments    Patient returns to clinic for a one day post operative right eye exam.  Patient denies having any eye discomfort.    FADUMO Lee 3:04 PM  October 13, 2023               Last edited by Tere Ames COT on 10/13/2023  3:08 PM.        No new flashes, floaters, or curtain down visual field.    Review of systems for the eyes was negative other than the pertinent positives/negatives listed in the HPI.    Ocular Meds: postop drops as instructed    Ocular Hx: refractive error OU; CE/IOL left eye (9/28/2023); CE/IOL right eye (10/12/23)    FOHx: no family history of glaucoma or blindness    PMHx: HLD; mild asthma    SocHx: physician working in pulmonary/critical care; Amateur astronomer    Assessment & Plan     Alek Gimenez is a 63 year old male with the following diagnoses:    Postoperative eye state  Pseudophakia OD  - Doing well, IOP wnl, fernanda negative, corneal edema as expected, no signs of infection, happy with results  - Keep eye shield in place at night for 7 days  - post-operative drops and taper according to instructions  -- vigamox QID x 1 week to surgical eye  -- predforte QID/TID/BID/daily/stop to procedure eye, taper weekly  -- ketorolac QID/TID/BID/daily/stop to procedure eye, taper weekly  - Post-operative do's and don'ts reviewed, questions answered  - patient has emergency contact information  - Counseled endophthalmitis/RD/return precautions    Pseudophakia OS  - Doing well, IOP wnl, fernanda negative, happy with results no signs of infection  - post-operative drops and taper according to instructions  -- predforte  "BID/daily/stop to procedure eye, taper weekly  -- ketorolac BID/daily/stop to procedure eye, taper weekly  - Post-operative do's and don'ts reviewed, questions answered  - patient has emergency contact information  - Counseled endophthalmitis/RD/return precautions     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 that resolves at near  - sees Dr Armijo who noted \"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 leading to turning in of the eyes predominately symptomatic at distance\"  - plan is for strabismus surgery after cataract surgery with Dr Armijo  - follow up with Dr Armijo as planned    MGD/Dry Eyes OU  - hold on warm compresses until POM1 after cataract surgery  - PFATs QID and prn OU    Posterior Vitreous Detachment OU  - no new flashes, floaters, or curtain down visual field; previously noted  - refractive status: myope  - prior dilated exam with no holes, tears, or detachment on exam; blair's negative  - counseled RD/return precautions    Refractive error  - hold on refraction until POM1 s/p cataract surgery    Counseled return/RD precautions    Patient disposition:   Return for Follow Up for post op cataract surgery visit, or sooner changes.    Attending Physician Attestation:  Complete documentation of historical and exam elements from today's encounter can be found in the full encounter summary report (not reduplicated in this progress note).  I personally obtained the chief complaint(s) and history of present illness.  I confirmed and edited as necessary the review of systems, past medical/surgical history, family history, social history, and examination findings as documented by others; and I examined the patient myself.  I personally reviewed the relevant tests, images, and reports as documented above.  I formulated and edited as necessary the assessment and plan " and discussed the findings and management plan with the patient and family. . - Jen Buck MD

## 2023-11-10 ENCOUNTER — OFFICE VISIT (OUTPATIENT)
Dept: OPHTHALMOLOGY | Facility: CLINIC | Age: 63
End: 2023-11-10
Attending: STUDENT IN AN ORGANIZED HEALTH CARE EDUCATION/TRAINING PROGRAM
Payer: COMMERCIAL

## 2023-11-10 DIAGNOSIS — H52.13 MYOPIA OF BOTH EYES WITH ASTIGMATISM AND PRESBYOPIA: ICD-10-CM

## 2023-11-10 DIAGNOSIS — H52.4 MYOPIA OF BOTH EYES WITH ASTIGMATISM AND PRESBYOPIA: ICD-10-CM

## 2023-11-10 DIAGNOSIS — Z96.1 PSEUDOPHAKIA, BOTH EYES: ICD-10-CM

## 2023-11-10 DIAGNOSIS — H52.203 MYOPIA OF BOTH EYES WITH ASTIGMATISM AND PRESBYOPIA: ICD-10-CM

## 2023-11-10 DIAGNOSIS — H43.813 PVD (POSTERIOR VITREOUS DETACHMENT), BOTH EYES: ICD-10-CM

## 2023-11-10 DIAGNOSIS — Z98.890 POSTOPERATIVE EYE STATE: Primary | ICD-10-CM

## 2023-11-10 PROCEDURE — 92134 CPTRZ OPH DX IMG PST SGM RTA: CPT | Performed by: STUDENT IN AN ORGANIZED HEALTH CARE EDUCATION/TRAINING PROGRAM

## 2023-11-10 PROCEDURE — 99024 POSTOP FOLLOW-UP VISIT: CPT | Performed by: STUDENT IN AN ORGANIZED HEALTH CARE EDUCATION/TRAINING PROGRAM

## 2023-11-10 PROCEDURE — 92015 DETERMINE REFRACTIVE STATE: CPT

## 2023-11-10 PROCEDURE — 99211 OFF/OP EST MAY X REQ PHY/QHP: CPT | Performed by: STUDENT IN AN ORGANIZED HEALTH CARE EDUCATION/TRAINING PROGRAM

## 2023-11-10 ASSESSMENT — CUP TO DISC RATIO
OD_RATIO: 0.1
OS_RATIO: 0.1

## 2023-11-10 ASSESSMENT — REFRACTION_MANIFEST
OS_ADD: +2.50
OS_AXIS: 175
OD_ADD: +2.50
OD_SPHERE: -0.25
OD_CYLINDER: SPHERE
OS_CYLINDER: +0.50
OS_SPHERE: -0.50

## 2023-11-10 ASSESSMENT — VISUAL ACUITY
OD_SC: 20/15
METHOD: SNELLEN - LINEAR
OS_SC: 20/20

## 2023-11-10 ASSESSMENT — SLIT LAMP EXAM - LIDS
COMMENTS: MGD
COMMENTS: MGD

## 2023-11-10 ASSESSMENT — TONOMETRY
OD_IOP_MMHG: 15
OS_IOP_MMHG: 13
IOP_METHOD: ICARE

## 2023-11-10 ASSESSMENT — EXTERNAL EXAM - RIGHT EYE: OD_EXAM: NORMAL

## 2023-11-10 ASSESSMENT — EXTERNAL EXAM - LEFT EYE: OS_EXAM: NORMAL

## 2023-11-10 NOTE — PROGRESS NOTES
"HPI       Post Op (Ophthalmology) Both Eyes    In both eyes.  Since onset it is stable.  Associated symptoms include Negative for eye pain, flashes and floaters.  Treatments tried include no treatments. Additional comments: Cataract extraction with IOL in the right eye on 10/12/2023  Cataract extraction with IOL in the left eye on 09/28/2023               Comments    Here for final post op both eyes. [facility] doing well. Finished course of drops yesterday. No pain.    Shantanu Blake COT 2:27 PM November 10, 2023             Last edited by Shantanu Blake on 11/10/2023  2:27 PM.        No new flashes, floaters, or curtain down visual field.    Review of systems for the eyes was negative other than the pertinent positives/negatives listed in the HPI.    Ocular Meds: none    Ocular Hx: refractive error OU; CE/IOL left eye (9/28/2023); CE/IOL right eye (10/12/23)    FOHx: no family history of glaucoma or blindness    PMHx: HLD; mild asthma    SocHx: physician working in pulmonary/critical care; Amateur astronomer    Assessment & Plan     Alek Gimenez is a 63 year old male with the following diagnoses:    Postoperative eye state  Pseudophakia OU  - Doing well, IOP wnl, fernanda negative, happy with results, no signs of infection  - off all drops  - patient has emergency contact information  - Counseled RD/return precautions    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 that resolves at near  - sees Dr Armijo who noted \"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 leading to turning in of the eyes predominately symptomatic at distance\"  - plan is for strabismus surgery after cataract surgery with Dr Armijo  - follow up with Dr Armijo as planned    MGD/Dry Eyes OU  - can do warm compresses BID OU x 5-10 min each time  - ATs QID and prn OU    Posterior Vitreous Detachment OU  - no new flashes, " floaters, or curtain down visual field  - refractive status: myope  - no holes, tears, or detachment on exam; blair's negative  - counseled RD/return precautions    Myopia of both eyes with astigmatism and presbyopia  - refraction reviewed and dispensed today per patient request    Counseled return/RD precautions    Patient disposition:   Return in about 1 year (around 11/10/2024) for Annual Visit, or sooner changes.    Attending Physician Attestation:  Complete documentation of historical and exam elements from today's encounter can be found in the full encounter summary report (not reduplicated in this progress note).  I personally obtained the chief complaint(s) and history of present illness.  I confirmed and edited as necessary the review of systems, past medical/surgical history, family history, social history, and examination findings as documented by others; and I examined the patient myself.  I personally reviewed the relevant tests, images, and reports as documented above.  I formulated and edited as necessary the assessment and plan and discussed the findings and management plan with the patient and family. . - Jen Buck MD

## 2023-11-10 NOTE — NURSING NOTE
Chief Complaints and History of Present Illnesses   Patient presents with    Post Op (Ophthalmology) Both Eyes     Cataract extraction with IOL in the right eye on 10/12/2023  Cataract extraction with IOL in the left eye on 09/28/2023       Chief Complaint(s) and History of Present Illness(es)       Post Op (Ophthalmology) Both Eyes              Laterality: both eyes    Course: stable    Associated symptoms: Negative for eye pain, flashes and floaters    Treatments tried: no treatments    Comments: Cataract extraction with IOL in the right eye on 10/12/2023  Cataract extraction with IOL in the left eye on 09/28/2023                Comments    Here for final post op both eyes. [facility] doing [facility]. Finished course of drops yesterday. No pain.    Shantanu THORNE 2:27 PM November 10, 2023

## 2023-11-25 ENCOUNTER — ANESTHESIA EVENT (OUTPATIENT)
Dept: SURGERY | Facility: AMBULATORY SURGERY CENTER | Age: 63
End: 2023-11-25
Payer: COMMERCIAL

## 2023-12-04 ENCOUNTER — HOSPITAL ENCOUNTER (OUTPATIENT)
Facility: AMBULATORY SURGERY CENTER | Age: 63
Discharge: HOME | End: 2023-12-04
Attending: OPHTHALMOLOGY
Payer: COMMERCIAL

## 2023-12-04 ENCOUNTER — ANESTHESIA (OUTPATIENT)
Dept: SURGERY | Facility: AMBULATORY SURGERY CENTER | Age: 63
End: 2023-12-04
Payer: COMMERCIAL

## 2023-12-04 VITALS
RESPIRATION RATE: 16 BRPM | OXYGEN SATURATION: 97 % | SYSTOLIC BLOOD PRESSURE: 111 MMHG | HEART RATE: 67 BPM | BODY MASS INDEX: 29.4 KG/M2 | WEIGHT: 210 LBS | HEIGHT: 71 IN | TEMPERATURE: 97.8 F | DIASTOLIC BLOOD PRESSURE: 69 MMHG

## 2023-12-04 DIAGNOSIS — Z98.890 POSTOPERATIVE EYE STATE: Primary | ICD-10-CM

## 2023-12-04 PROCEDURE — 67311 REVISE EYE MUSCLE: CPT | Mod: XS,LT

## 2023-12-04 RX ORDER — TETRACAINE HYDROCHLORIDE 5 MG/ML
1 SOLUTION OPHTHALMIC
Status: DISCONTINUED | OUTPATIENT
Start: 2023-12-04 | End: 2023-12-05 | Stop reason: HOSPADM

## 2023-12-04 RX ORDER — ACETAMINOPHEN 325 MG/1
975 TABLET ORAL ONCE
Status: COMPLETED | OUTPATIENT
Start: 2023-12-04 | End: 2023-12-04

## 2023-12-04 RX ORDER — GLYCOPYRROLATE 0.2 MG/ML
INJECTION, SOLUTION INTRAMUSCULAR; INTRAVENOUS PRN
Status: DISCONTINUED | OUTPATIENT
Start: 2023-12-04 | End: 2023-12-04

## 2023-12-04 RX ORDER — FENTANYL CITRATE 50 UG/ML
25 INJECTION, SOLUTION INTRAMUSCULAR; INTRAVENOUS EVERY 5 MIN PRN
Status: DISCONTINUED | OUTPATIENT
Start: 2023-12-04 | End: 2023-12-04 | Stop reason: HOSPADM

## 2023-12-04 RX ORDER — PREDNISOLONE ACETATE 10 MG/ML
SUSPENSION/ DROPS OPHTHALMIC
Qty: 10 ML | Refills: 0 | Status: SHIPPED | OUTPATIENT
Start: 2023-12-04

## 2023-12-04 RX ORDER — ONDANSETRON 2 MG/ML
4 INJECTION INTRAMUSCULAR; INTRAVENOUS EVERY 30 MIN PRN
Status: DISCONTINUED | OUTPATIENT
Start: 2023-12-04 | End: 2023-12-04 | Stop reason: HOSPADM

## 2023-12-04 RX ORDER — ACETAMINOPHEN 325 MG/1
975 TABLET ORAL ONCE
Status: DISCONTINUED | OUTPATIENT
Start: 2023-12-04 | End: 2023-12-04 | Stop reason: HOSPADM

## 2023-12-04 RX ORDER — PROPOFOL 10 MG/ML
INJECTION, EMULSION INTRAVENOUS PRN
Status: DISCONTINUED | OUTPATIENT
Start: 2023-12-04 | End: 2023-12-04

## 2023-12-04 RX ORDER — DEXAMETHASONE SODIUM PHOSPHATE 4 MG/ML
INJECTION, SOLUTION INTRA-ARTICULAR; INTRALESIONAL; INTRAMUSCULAR; INTRAVENOUS; SOFT TISSUE PRN
Status: DISCONTINUED | OUTPATIENT
Start: 2023-12-04 | End: 2023-12-04

## 2023-12-04 RX ORDER — BALANCED SALT SOLUTION 6.4; .75; .48; .3; 3.9; 1.7 MG/ML; MG/ML; MG/ML; MG/ML; MG/ML; MG/ML
SOLUTION OPHTHALMIC PRN
Status: DISCONTINUED | OUTPATIENT
Start: 2023-12-04 | End: 2023-12-04 | Stop reason: HOSPADM

## 2023-12-04 RX ORDER — LIDOCAINE 40 MG/G
CREAM TOPICAL
Status: DISCONTINUED | OUTPATIENT
Start: 2023-12-04 | End: 2023-12-04 | Stop reason: HOSPADM

## 2023-12-04 RX ORDER — LIDOCAINE HYDROCHLORIDE 20 MG/ML
INJECTION, SOLUTION INFILTRATION; PERINEURAL PRN
Status: DISCONTINUED | OUTPATIENT
Start: 2023-12-04 | End: 2023-12-04

## 2023-12-04 RX ORDER — OXYCODONE HYDROCHLORIDE 5 MG/1
5 TABLET ORAL
Status: DISCONTINUED | OUTPATIENT
Start: 2023-12-04 | End: 2023-12-05 | Stop reason: HOSPADM

## 2023-12-04 RX ORDER — OXYMETAZOLINE HYDROCHLORIDE 0.05 G/100ML
SPRAY NASAL PRN
Status: DISCONTINUED | OUTPATIENT
Start: 2023-12-04 | End: 2023-12-04 | Stop reason: HOSPADM

## 2023-12-04 RX ORDER — PROPOFOL 10 MG/ML
INJECTION, EMULSION INTRAVENOUS CONTINUOUS PRN
Status: DISCONTINUED | OUTPATIENT
Start: 2023-12-04 | End: 2023-12-04

## 2023-12-04 RX ORDER — KETOROLAC TROMETHAMINE 30 MG/ML
INJECTION, SOLUTION INTRAMUSCULAR; INTRAVENOUS PRN
Status: DISCONTINUED | OUTPATIENT
Start: 2023-12-04 | End: 2023-12-04

## 2023-12-04 RX ORDER — SODIUM CHLORIDE, SODIUM LACTATE, POTASSIUM CHLORIDE, CALCIUM CHLORIDE 600; 310; 30; 20 MG/100ML; MG/100ML; MG/100ML; MG/100ML
INJECTION, SOLUTION INTRAVENOUS CONTINUOUS
Status: DISCONTINUED | OUTPATIENT
Start: 2023-12-04 | End: 2023-12-04 | Stop reason: HOSPADM

## 2023-12-04 RX ORDER — ACETAMINOPHEN 325 MG/1
975 TABLET ORAL ONCE
Status: DISCONTINUED | OUTPATIENT
Start: 2023-12-04 | End: 2023-12-05 | Stop reason: HOSPADM

## 2023-12-04 RX ORDER — ONDANSETRON 4 MG/1
4 TABLET, ORALLY DISINTEGRATING ORAL EVERY 30 MIN PRN
Status: DISCONTINUED | OUTPATIENT
Start: 2023-12-04 | End: 2023-12-04 | Stop reason: HOSPADM

## 2023-12-04 RX ORDER — FENTANYL CITRATE 50 UG/ML
50 INJECTION, SOLUTION INTRAMUSCULAR; INTRAVENOUS EVERY 5 MIN PRN
Status: DISCONTINUED | OUTPATIENT
Start: 2023-12-04 | End: 2023-12-04 | Stop reason: HOSPADM

## 2023-12-04 RX ORDER — HYDROMORPHONE HYDROCHLORIDE 1 MG/ML
0.2 INJECTION, SOLUTION INTRAMUSCULAR; INTRAVENOUS; SUBCUTANEOUS EVERY 5 MIN PRN
Status: DISCONTINUED | OUTPATIENT
Start: 2023-12-04 | End: 2023-12-04 | Stop reason: HOSPADM

## 2023-12-04 RX ORDER — ONDANSETRON 2 MG/ML
4 INJECTION INTRAMUSCULAR; INTRAVENOUS EVERY 30 MIN PRN
Status: DISCONTINUED | OUTPATIENT
Start: 2023-12-04 | End: 2023-12-05 | Stop reason: HOSPADM

## 2023-12-04 RX ORDER — ONDANSETRON 4 MG/1
4 TABLET, ORALLY DISINTEGRATING ORAL EVERY 30 MIN PRN
Status: DISCONTINUED | OUTPATIENT
Start: 2023-12-04 | End: 2023-12-05 | Stop reason: HOSPADM

## 2023-12-04 RX ORDER — OXYCODONE HYDROCHLORIDE 5 MG/1
10 TABLET ORAL
Status: DISCONTINUED | OUTPATIENT
Start: 2023-12-04 | End: 2023-12-05 | Stop reason: HOSPADM

## 2023-12-04 RX ORDER — ONDANSETRON 2 MG/ML
INJECTION INTRAMUSCULAR; INTRAVENOUS PRN
Status: DISCONTINUED | OUTPATIENT
Start: 2023-12-04 | End: 2023-12-04

## 2023-12-04 RX ORDER — FENTANYL CITRATE 50 UG/ML
INJECTION, SOLUTION INTRAMUSCULAR; INTRAVENOUS PRN
Status: DISCONTINUED | OUTPATIENT
Start: 2023-12-04 | End: 2023-12-04

## 2023-12-04 RX ORDER — HYDROMORPHONE HYDROCHLORIDE 1 MG/ML
0.4 INJECTION, SOLUTION INTRAMUSCULAR; INTRAVENOUS; SUBCUTANEOUS EVERY 5 MIN PRN
Status: DISCONTINUED | OUTPATIENT
Start: 2023-12-04 | End: 2023-12-04 | Stop reason: HOSPADM

## 2023-12-04 RX ADMIN — TETRACAINE HYDROCHLORIDE 1 DROP: 5 SOLUTION OPHTHALMIC at 11:00

## 2023-12-04 RX ADMIN — LIDOCAINE HYDROCHLORIDE 90 MG: 20 INJECTION, SOLUTION INFILTRATION; PERINEURAL at 09:51

## 2023-12-04 RX ADMIN — DEXAMETHASONE SODIUM PHOSPHATE 4 MG: 4 INJECTION, SOLUTION INTRA-ARTICULAR; INTRALESIONAL; INTRAMUSCULAR; INTRAVENOUS; SOFT TISSUE at 09:57

## 2023-12-04 RX ADMIN — DEXAMETHASONE SODIUM PHOSPHATE 4 MG: 4 INJECTION, SOLUTION INTRA-ARTICULAR; INTRALESIONAL; INTRAMUSCULAR; INTRAVENOUS; SOFT TISSUE at 10:33

## 2023-12-04 RX ADMIN — PROPOFOL 200 MCG/KG/MIN: 10 INJECTION, EMULSION INTRAVENOUS at 09:52

## 2023-12-04 RX ADMIN — FENTANYL CITRATE 50 MCG: 50 INJECTION, SOLUTION INTRAMUSCULAR; INTRAVENOUS at 09:52

## 2023-12-04 RX ADMIN — KETOROLAC TROMETHAMINE 15 MG: 30 INJECTION, SOLUTION INTRAMUSCULAR; INTRAVENOUS at 10:33

## 2023-12-04 RX ADMIN — PROPOFOL 200 MG: 10 INJECTION, EMULSION INTRAVENOUS at 09:51

## 2023-12-04 RX ADMIN — FENTANYL CITRATE 50 MCG: 50 INJECTION, SOLUTION INTRAMUSCULAR; INTRAVENOUS at 09:57

## 2023-12-04 RX ADMIN — PROPOFOL 200 MCG/KG/MIN: 10 INJECTION, EMULSION INTRAVENOUS at 10:09

## 2023-12-04 RX ADMIN — ONDANSETRON 4 MG: 2 INJECTION INTRAMUSCULAR; INTRAVENOUS at 10:33

## 2023-12-04 RX ADMIN — PROPOFOL 200 MCG/KG/MIN: 10 INJECTION, EMULSION INTRAVENOUS at 10:31

## 2023-12-04 RX ADMIN — ACETAMINOPHEN 975 MG: 325 TABLET ORAL at 09:13

## 2023-12-04 RX ADMIN — TETRACAINE HYDROCHLORIDE 1 DROP: 5 SOLUTION OPHTHALMIC at 11:49

## 2023-12-04 RX ADMIN — SODIUM CHLORIDE, SODIUM LACTATE, POTASSIUM CHLORIDE, CALCIUM CHLORIDE: 600; 310; 30; 20 INJECTION, SOLUTION INTRAVENOUS at 09:44

## 2023-12-04 RX ADMIN — GLYCOPYRROLATE 0.2 MG: 0.2 INJECTION, SOLUTION INTRAMUSCULAR; INTRAVENOUS at 09:57

## 2023-12-04 RX ADMIN — TETRACAINE HYDROCHLORIDE 1 DROP: 5 SOLUTION OPHTHALMIC at 11:12

## 2023-12-04 RX ADMIN — TETRACAINE HYDROCHLORIDE 1 DROP: 5 SOLUTION OPHTHALMIC at 11:34

## 2023-12-04 NOTE — ANESTHESIA PROCEDURE NOTES
Airway       Patient location during procedure: OR  Staff -        CRNA: Shea Reese APRN CRNA       Performed By: CRNA  Consent for Airway        Urgency: elective  Indications and Patient Condition       Indications for airway management: josefina-procedural       Induction type:intravenous       Mask difficulty assessment: 0 - not attempted    Final Airway Details       Final airway type: supraglottic airway    Supraglottic Airway Details        Type: LMA       Brand: LMA Unique       LMA size: 5    Post intubation assessment        Placement verified by: capnometry, equal breath sounds and chest rise        Number of attempts at approach: 1       Secured with: tape       Ease of procedure: easy       Dentition: Intact

## 2023-12-04 NOTE — ANESTHESIA PREPROCEDURE EVALUATION
Anesthesia Pre-Procedure Evaluation    Patient: Alek Gimenez   MRN: [id number] : 1960        Procedure : Procedure(s):  Bilateral eye muscle correction with possible use of adjustable suture          Past Medical History:   Diagnosis Date    Hyperlipidemia, unspecified hyperlipidemia type 2019    Mild intermittent reactive airway disease without complication 3/30/2023    Upper GI bleeding 2019      Past Surgical History:   Procedure Laterality Date    CATARACT IOL, RT/LT      ESOPHAGOSCOPY, GASTROSCOPY, DUODENOSCOPY (EGD), COMBINED N/A 2019    Procedure: Esophagogastroduodenoscopy, With Biopsy;  Surgeon: Дмитрий Peña MD;  Location:  GI    PHACOEMULSIFICATION CLEAR CORNEA WITH STANDARD INTRAOCULAR LENS IMPLANT Left 2023    Procedure: LEFT EYE PHACOEMULSIFICATION, CATARACT, WITH INTRAOCULAR LENS IMPLANT;  Surgeon: Jen Buck MD;  Location: [facility] OR    PHACOEMULSIFICATION CLEAR CORNEA WITH STANDARD INTRAOCULAR LENS IMPLANT Right 10/12/2023    Procedure: RIGHT EYE PHACOEMULSIFICATION, CATARACT, WITH INTRAOCULAR LENS IMPLANT;  Surgeon: Jen Buck MD;  Location: [facility] OR      No Known Allergies   Social History     Tobacco Use    Smoking status: Never    Smokeless tobacco: Never   Substance Use Topics    Alcohol use: Yes     Comment: 1 drink per month      Wt Readings from Last 1 Encounters:   10/12/23 95.3 kg (210 lb)        Anesthesia Evaluation   Pt has had prior anesthetic.     No history of anesthetic complications       ROS/MED HX  ENT/Pulmonary:     (+)                     asthma                  Neurologic:  - neg neurologic ROS     Cardiovascular:     (+) Dyslipidemia - -   -  - -                                      METS/Exercise Tolerance: >4 METS    Hematologic:  - neg hematologic  ROS     Musculoskeletal:  - neg musculoskeletal ROS     GI/Hepatic:  - neg GI/hepatic ROS     Renal/Genitourinary:  - neg Renal ROS     Endo:  - neg endo ROS    "  Psychiatric/Substance Use:  - neg psychiatric ROS     Infectious Disease:  - neg infectious disease ROS     Malignancy:  - neg malignancy ROS     Other:  - neg other ROS          Physical Exam    Airway  airway exam normal      Mallampati: I   TM distance: > 3 FB   Neck ROM: full   Mouth opening: > 3 cm    Respiratory Devices and Support         Dental       (+) Completely normal teeth      Cardiovascular   cardiovascular exam normal       Rhythm and rate: regular and normal     Pulmonary   pulmonary exam normal        breath sounds clear to auscultation           OUTSIDE LABS:  CBC:   Lab Results   Component Value Date    WBC 9.2 03/30/2023    WBC 8.2 05/19/2021    HGB 15.4 03/30/2023    HGB 14.7 05/19/2021    HCT 46.2 03/30/2023    HCT 43.3 05/19/2021     03/30/2023     05/19/2021     BMP:   Lab Results   Component Value Date     03/30/2023     05/19/2021    POTASSIUM 4.6 03/30/2023    POTASSIUM 4.2 05/19/2021    CHLORIDE 103 03/30/2023    CHLORIDE 105 05/19/2021    CO2 24 03/30/2023    CO2 27 05/19/2021    BUN 19.6 03/30/2023    BUN 17 05/19/2021    CR 1.18 (H) 03/30/2023    CR 1.01 05/19/2021    GLC 95 03/30/2023    GLC 94 05/19/2021     COAGS: No results found for: \"PTT\", \"INR\", \"FIBR\"  POC: No results found for: \"BGM\", \"HCG\", \"HCGS\"  HEPATIC:   Lab Results   Component Value Date    ALBUMIN 4.6 03/30/2023    PROTTOTAL 7.6 03/30/2023    ALT 36 03/30/2023    AST 24 03/30/2023    ALKPHOS 115 03/30/2023    BILITOTAL 0.4 03/30/2023     OTHER:   Lab Results   Component Value Date    A1C 5.3 12/29/2019    MARIA ESTHER 9.4 03/30/2023    TSH 2.34 08/30/2005       Anesthesia Plan    ASA Status:  2    NPO Status:  NPO Appropriate    Anesthesia Type: MAC.     - Reason for MAC: straight local not clinically adequate   Induction: N/a.   Maintenance: N/A.        Consents    Anesthesia Plan(s) and associated risks, benefits, and realistic alternatives discussed. Questions answered and " patient/representative(s) expressed understanding.     - Discussed:     - Discussed with:  Patient       Use of blood products discussed: No .     Postoperative Care    Pain management: Multi-modal analgesia.   PONV prophylaxis: Ondansetron (or other 5HT-3)     Comments:           H&P reviewed: Unable to attach H&P to encounter due to EHR limitations. H&P Update: appropriate H&P reviewed, patient examined. No interval changes since H&P (within 30 days).        Max Dorantes, DO    I have reviewed the pertinent notes and labs in the chart from the past 30 days and (re)examined the patient.  Any updates or changes from those notes are reflected in this note.

## 2023-12-04 NOTE — ANESTHESIA POSTPROCEDURE EVALUATION
Patient: Alke Gimenez    Procedure: Procedure(s):  Bilateral eye muscle correction with use of adjustable suture on left eye       Anesthesia Type:  General    Note:  Disposition: Outpatient   Postop Pain Control: Uneventful            Sign Out: Well controlled pain   PONV: No   Neuro/Psych: Uneventful            Sign Out: Acceptable/Baseline neuro status   Airway/Respiratory: Uneventful            Sign Out: Acceptable/Baseline resp. status   CV/Hemodynamics: Uneventful            Sign Out: Acceptable CV status; No obvious hypovolemia; No obvious fluid overload   Other NRE: NONE   DID A NON-ROUTINE EVENT OCCUR? No           Last vitals:  Vitals Value Taken Time   /80 12/04/23 1115   Temp 36.1  C (97  F) 12/04/23 1100   Pulse 70 12/04/23 1115   Resp 12 12/04/23 1115   SpO2 86 % 12/04/23 1117   Vitals shown include unfiled device data.    Electronically Signed By: Max Dorantes DO  December 4, 2023  3:23 PM

## 2023-12-04 NOTE — BRIEF OP NOTE
[facility] Brief Operative Note    Pre-operative diagnosis: Double vision [H53.2]   Post-operative diagnosis Same   Procedure: Procedure(s):  Bilateral eye muscle correction with use of adjustable suture on left eye   Surgeon: Phillip Armijo MD    Assistants(s): MD Cayetano Catherine MD   Estimated blood loss: Less than 1 cc    Specimens: None   Findings: See full operative report

## 2023-12-04 NOTE — OP NOTE
ATTENDING SURGEON:   Claudio Armijo MD     DATE OF PROCEDURE:   December 4, 2023     FIRST SURGICAL ASSISTANT:   Feliz Echols MD: Neuro-ophthalmology fellow     SECOND SURGICAL ASSISTANT:   Cayetano Medina MD (PGY 3 ophthalmology resident)     ANESTHESIA:   General anesthesia     PREOPERATIVE DIAGNOSIS (ES):   1. 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 Right lateral rectus resection 4.0 mm   2. Left lateral rectus resection 5.0 mm on adjustable suture. 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 His sensorimotor exam shows a divergence insufficiency pattern esotropia which in his clinical context implicates a diagnosis of sagging eye syndrome. He was eager for strabismus surgery to allow him single binocular vision either without prisms or with a small amount of prism.     I warned the patient about the risks, benefits, and alternatives of eye muscle surgery including a relatively small risk for severe infection, retinal detachment, and permanent vision loss of the eye.  I also discussed the possibility of postoperative double vision.  I discussed the possibility that due to postoperative double vision or a postoperative recurrent strabismus, the patient may require additional strabismus procedures in the future.  Understanding these risks, the patient decided to proceed with strabismus surgery.      DESCRIPTION OF PROCEDURE:     After the risks, benefits, and alternatives of eye muscle surgery were discussed with the patient and the patient's questions were answered both in clinic and on the day of surgery, written informed consent was obtained and witnessed in the preoperative holding area on the day of surgery.  The  "word \"yes\" was written over both eyes indicating that the patient consented to eye muscle correction in both eyes.  An intravenous line was placed and light intravenous sedation was given.  The patient was transported to the operating room where after appropriate monitors were placed, the patient underwent induction of general anesthesia without complication.      Both eyes were prepped and draped in the usual sterile fashion for ophthalmic surgery and a lid speculum was placed in the right eye.  Forced duction testing in this eye revealed no restriction.  A trapezoidal temporal conjunctival flap was created using conjunctival forceps and Gaston scissors.  This was reflected backwards to expose the right lateral rectus muscle which was isolated using a [place] muscle hook.  The muscle was freed from Tenon's capsule with blunt dissection using a Gaston scissors and cotton swab.  A double armed 6-0 Vicryl suture was then woven across the width of the muscle at a point measured to be 4.0 mm posterior to the insertion and tied to the edges.  A hemostat straight clamp was then clamped perpendicular to the muscle just anterior to the suture and the distal 3.5 mm muscle segment was excised with a Gaston scissors and 0.3 forceps.  Both arms of the 6-0 Vicryl suture were then passed partial thickness through the sclera in a crossing swords technique at the physiologic insertion site.  At this point, the ends of the suture were tied together tightly advancing the muscle and completing a resection effect of 4.0 mm.  The needles were cut off and the ends were cut short.  The conjunctival flap was then re-opposed in the surgical bed and held in place using two 6-0 plain gut sutures.  The lid speculum was removed from the operative eye.     A lid speculum was placed in the left eye.  Forced duction testing in this eye revealed no restriction.  A trapezoidal temporal conjunctival flap was created using conjunctival forceps " "and Gaston scissors.  This was reflected backwards to expose the left lateral rectus muscle which was isolated using a [place] muscle hook.  The muscle was freed from Tenon's capsule with blunt dissection using a Gaston scissors and cotton swab.  A double armed 6-0 Vicryl suture was then woven across the width of the muscle at a point measured to be 5.0 mm posterior to the insertion and tied to the edges.  A hemostat straight clamp was then clamped perpendicular to the muscle just anterior to the suture and the distal 4.5 mm muscle segment was excised with a Gaston scissors and 0.3 forceps.  Both arms of the 6-0 Vicryl suture were then passed partial thickness through the sclera in a crossing swords technique at the physiologic insertion site.  At this point, a 6-0 undyed Vicryl suture with needle removed was used to create a sliding \"noose\" knot allowing for post-operative adjustment.  A 6-0 Vicryl suture on a spatulated needle was then used to make a partial-thickness scleral bite adjacent to the operative muscle physiologic insertion to serve as a traction suture facilitating the adjustment process.  The needles were then cut off of the double armed 6-0 pole sutures.  The proximal portion of the operative muscle was then fully advanced completing the resection effect of 5.0 mm.  Viscoelastic agent was then used to provide lubrication to the noose knot so that it would slide freely.  The trapezoidal conjunctival flap was reopposed in the surgical bed and held in place with 6-0 plain gut suture using one permanent suture and one temporary suture (which would be made permanent following adjustment post-operartively).The lid speculum was removed from the operative eye.     Steri-Strips were used to hold down the free ends of the four sutures used for adjustment.     Maxitrol ointment was placed in both eyes.  The patient was awakened from general anesthesia without complication and discharged to the recovery " room in stable condition.       SPECIMENS REMOVED:   None.      ESTIMATED BLOOD LOSS:   1 mL or less.     INTRAOPERATIVE FLUIDS:   As per anesthesia records.      SPONGE/INSTRUMENT/NEEDLE COUNTS:   All sponge, instrument, and needle counts were correct times two.     CONDITION ON DISCHARGE FROM OPERATING ROOM:   Stable.      COMPLICATIONS:   None.     I was present for the entire procedure     POST-OPERATIVE ADJUSTMENT    After awakening from anesthesia sufficiently to communicate clearly and fixate on a target consistent, the adjustment process began.  Tetracaine topical anesthetic was placed in both eyes and steri-strips holding down the temporary sutures were removed from the face.      Alignment measurements were obtained and showed a consecutive exotropia of 14-16 prism diopters in primary gaze. The left lateral rectus was then recessed approximately 2 mm with repeat alternate cover testing in primary gaze showing an intermittent left exotropia of 8 prism diopters.    All sutures were then tied off and ends were cut short.  The adjustment process took an estimated 20 minutes to complete.

## 2023-12-04 NOTE — ANESTHESIA CARE TRANSFER NOTE
Patient: Alek Gimenez    Procedure: Procedure(s):  Bilateral eye muscle correction with use of adjustable suture on left eye       Diagnosis: Double vision [H53.2]  Diagnosis Additional Information: No value filed.    Anesthesia Type:   General     Note:    Oropharynx: oropharynx clear of all foreign objects and spontaneously breathing  Level of Consciousness: awake  Oxygen Supplementation: face mask  Level of Supplemental Oxygen (L/min / FiO2): 6  Independent Airway: airway patency satisfactory and stable  Dentition: dentition unchanged  Vital Signs Stable: post-procedure vital signs reviewed and stable  Report to RN Given: handoff report given  Patient transferred to: Phase II    Handoff Report: Identifed the Patient, Identified the Reponsible Provider, Reviewed the pertinent medical history, Discussed the surgical course, Reviewed Intra-OP anesthesia mangement and issues during anesthesia, Set expectations for post-procedure period and Allowed opportunity for questions and acknowledgement of understanding    Vitals:  Vitals Value Taken Time   /108 12/04/23 1049   Temp     Pulse 77 12/04/23 1051   Resp 8 12/04/23 1051   SpO2 100 % 12/04/23 1051   Vitals shown include unfiled device data.    Electronically Signed By: KRISTA Dominguez CRNA  December 4, 2023  10:51 AM

## 2023-12-04 NOTE — DISCHARGE INSTRUCTIONS
Instructions for after your eye muscle surgery:    Instill 1 cm of Maxitrol ophthalmic ointment in the operative eye(s) in 3 times per day until follow-up with Dr. Armijo in about 1-2 weeks.  The ointment is expected to blur vision but is necessary.      You will also go home with a prescription for a steroid eye drop. Do NOT start this eye drop yet.  When you see Dr. Armijo at your post-operative visit he will tell you if and when to start the drops.    Avoid all eye pressure or trauma for 7 days.  No eye rubbing, straining, swimming, athletics, or outdoor activities in which dirt or foreign objects could enter the eye.      ok to take a bath and shower immediately but don t run shower water on face.      Tylenol or ibuprofen over the counter may be used for pain.  Pain can occasionally be moderate for 1 or 2 days after surgery.  If pain is severe or does not improve greatly with over the counter medications call our office.    Return for follow-up with Dr. Armijo as already scheduled (generally about 1-2 weeks after surgery).  If you do not have an appointment already or you need to change your 1-2 week appointment, please call Miguel Kothari at [phone] or our  at [phone]     If you are concerned about a healing problem after surgery, contact my assistant Harmony Glaser at [phone] or our triage nurse at [phone] during standard business hours.  After hours for emergent concerns you may call the [facility] at [phone] and ask to speak with the ophthalmology resident on call.[facility]  Home Care Following Anesthesia  For 24 hours after surgery:  Get plenty of rest.  A responsible adult must stay with you for at least 24 hours after you leave the surgery center.  Do not drive or use heavy equipment.  If you have weakness or tingling, don't drive or use heavy equipment until this feeling goes away.   Do not  drink alcohol.   Avoid strenuous or risky activities.  Ask for help when climbing stairs.  You may feel lightheaded.  IF so, sit for a few minutes before standing.  Have someone help you get up.   If you have nausea (feel sick to your stomach): Drink only clear liquids such as apple juice, ginger ale, broth or 7-Up.  Rest may also help.  Be sure to drink enough fluids.  Move to a regular diet as you feel able.   You may have a slight fever.  Call the doctor if your fever is over 100 F (37.7 C) (taken under the tongue) or lasts longer than 24 hours.  You may have a dry mouth, a sore throat, muscle aches or trouble sleeping. These should go away after 24 hours.  Do not make important or legal decisions.   It is recommended to avoid smoking.               Tips for taking pain medications  To get the best pain relief possible, remember these points:  Take pain medications as directed, before pain becomes severe.  Pain medication can upset your stomach: taking it with food may help.  Constipation is a common side effect of pain medication. Drink plenty of  fluids.  Eat foods high in fiber. Take a stool softener if recommended by your doctor or pharmacist.  Do not drink alcohol, drive or operate machinery while taking pain medications.  Ask about other ways to control pain, such as with heat, ice or relaxation.    Tylenol/Acetaminophen Consumption    If you feel your pain relief is insufficient, you may take Tylenol/Acetaminophen in addition to your narcotic pain medication.   Be careful not to exceed 4,000 mg of Tylenol/Acetaminophen in a 24 hour period from all sources.  If you are taking extra strength Tylenol/acetaminophen (500 mg), the maximum dose is 8 tablets in 24 hours.  If you are taking regular strength acetaminophen (325 mg), the maximum dose is 12 tablets in 24 hours.    Call a doctor for any of the following:  Signs of infection (fever, growing tenderness at the surgery site, a large amount of drainage or  bleeding, severe pain, foul-smelling drainage, redness, swelling).  It has been over 8 to 10 hours since surgery and you are still not able to urinate (pass water).  Headache for over 24 hours.  Numbness, tingling or weakness the day after surgery (if you had spinal anesthesia).  Signs of Covid-19 infection (temperature over 100 degrees, shortness of breath, cough, loss of taste/smell, generalized body aches, persistent headache, chills, sore throat, nausea/vomiting/diarrhea)  Your doctor is:       Dr. Claudio Armijo, Ophthalmology: [phone]               Or dial [phone] and ask for the resident on call for:  Ophthalmology  For emergency care, call the:  [facility]:  [phone] (TTY for hearing impaired: [phone])

## 2023-12-13 ENCOUNTER — OFFICE VISIT (OUTPATIENT)
Dept: OPHTHALMOLOGY | Facility: CLINIC | Age: 63
End: 2023-12-13
Attending: OPHTHALMOLOGY
Payer: COMMERCIAL

## 2023-12-13 DIAGNOSIS — H53.2 DOUBLE VISION: Primary | ICD-10-CM

## 2023-12-13 PROCEDURE — 99213 OFFICE O/P EST LOW 20 MIN: CPT | Performed by: OPHTHALMOLOGY

## 2023-12-13 PROCEDURE — 99207 PR NO BILLABLE SERVICE THIS VISIT: CPT | Performed by: OPHTHALMOLOGY

## 2023-12-13 ASSESSMENT — CONF VISUAL FIELD
OD_SUPERIOR_TEMPORAL_RESTRICTION: 0
OS_INFERIOR_TEMPORAL_RESTRICTION: 0
OD_INFERIOR_NASAL_RESTRICTION: 0
OS_NORMAL: 1
OS_SUPERIOR_TEMPORAL_RESTRICTION: 0
OS_SUPERIOR_NASAL_RESTRICTION: 0
OS_INFERIOR_NASAL_RESTRICTION: 0
OD_NORMAL: 1
OD_SUPERIOR_NASAL_RESTRICTION: 0
OD_INFERIOR_TEMPORAL_RESTRICTION: 0

## 2023-12-13 ASSESSMENT — SLIT LAMP EXAM - LIDS
COMMENTS: MGD
COMMENTS: MGD

## 2023-12-13 ASSESSMENT — VISUAL ACUITY
METHOD: SNELLEN - LINEAR
OS_SC: 20/20
OD_SC: 20/20

## 2023-12-13 ASSESSMENT — TONOMETRY
IOP_METHOD: ICARE
OD_IOP_MMHG: 10
OS_IOP_MMHG: 10

## 2023-12-13 ASSESSMENT — EXTERNAL EXAM - RIGHT EYE: OD_EXAM: NORMAL

## 2023-12-13 ASSESSMENT — EXTERNAL EXAM - LEFT EYE: OS_EXAM: NORMAL

## 2023-12-13 NOTE — PROGRESS NOTES
1. 1-2 week post-op status post strabismus surgery:     -Surgery: 12/4/23    PREOPERATIVE DIAGNOSIS (ES):   1. 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 Right lateral rectus resection 4.0 mm   2. Left lateral rectus resection 5.0 mm on adjustable suture. Recessed approximately 2 mm during post-operative adjustment     - Alignment: small exophoria   - Diplopia: only has diplopia rarely when looking up.   - Healing up appropriately  - Maxitrol ophthalmic ointment: stop  - Start Predforte 1% four times a day in the operative eye(s) and decrease by one drop daily every week.  Course will complete in 1 month.    Return to clinic in 3 months or sooner as needed.         Feliz Echols MD   Neuro-ophthalmology fellow     Complete documentation of historical and exam elements from today's encounter can be found in the full encounter summary report (not reduplicated in this progress note).  I personally obtained the chief complaint(s) and history of present illness.  I confirmed and edited as necessary the review of systems, past medical/surgical history, family history, social history, and examination findings as documented by others; and I examined the patient myself.  I personally reviewed the relevant tests, images, and reports as documented above.  I formulated and edited as necessary the assessment and plan and discussed the findings and management plan with the patient and family.  I personally reviewed the ophthalmic test(s) associated with this encounter, agree with the interpretation(s) as documented by the resident/fellow, and have edited the corresponding report(s) as necessary.     Claudio Armijo MD

## 2023-12-22 ENCOUNTER — TELEPHONE (OUTPATIENT)
Dept: OPHTHALMOLOGY | Facility: CLINIC | Age: 63
End: 2023-12-22
Payer: COMMERCIAL

## 2023-12-22 NOTE — TELEPHONE ENCOUNTER
Spoke to patient over the phone about left eye pain. States it feels like something is sticking him in the eye it's a sharp pain. Denies worsening redness, discharge or vision changes. Discussed options of trying refresh PM ointment or frequent AT's for FBS. I gave patient my contact information in case anything worsens. Patient voiced understanding.     Feliz Echols MD   Fellow, Neuro-Ophthalmology

## 2024-01-07 DIAGNOSIS — J45.20 MILD INTERMITTENT REACTIVE AIRWAY DISEASE WITHOUT COMPLICATION: ICD-10-CM

## 2024-01-08 RX ORDER — BUDESONIDE AND FORMOTEROL FUMARATE DIHYDRATE 80; 4.5 UG/1; UG/1
AEROSOL RESPIRATORY (INHALATION)
Qty: 10.2 G | Refills: 0 | Status: SHIPPED | OUTPATIENT
Start: 2024-01-08 | End: 2024-02-16

## 2024-02-15 DIAGNOSIS — J45.20 MILD INTERMITTENT REACTIVE AIRWAY DISEASE WITHOUT COMPLICATION: ICD-10-CM

## 2024-02-16 RX ORDER — BUDESONIDE AND FORMOTEROL FUMARATE DIHYDRATE 80; 4.5 UG/1; UG/1
AEROSOL RESPIRATORY (INHALATION)
Qty: 10.2 G | Refills: 1 | Status: SHIPPED | OUTPATIENT
Start: 2024-02-16

## 2024-03-26 NOTE — PROGRESS NOTES
1. sagging eye syndrome with alternating esotropia   Status post strabismus surgery 12/4/23 Right lateral rectus resection 4.0 mm, Left lateral rectus resection 5.0 mm on adjustable suture. Recessed approximately 2 mm during post-operative adjustment     Patient is happy with results without any double vision.      2. Pseudophakia in both eyes   1+ posterior capsular opacity in the right eye - patient is asymptomatic. Patient should follow-up with his primary eye care provider to follow his eyes and may become symptomatic from his right posterior capsular opacity in the future which would be remedied by a YAG laser capsulotomy.    I did not make a follow-up appointment, but I would be happy to see the patient back in the future should any new neuro-ophthalmic concern arise.     Interval hx since last visit:   Patient is happy with results and is not having any double vision or blurry vision. He's off all post op drops and ointment.     Exam:   VA 20/20 OD, 20/20 OS. Color vision full each eye. Pupils no rAPD. Anterior segment bilateral conj scarring temporally. Posterior segment wnl for age. Strabismus ortho with flick E in lateral gazes.        Complete documentation of historical and exam elements from today's encounter can be found in the full encounter summary report (not reduplicated in this progress note).  I personally obtained the chief complaint(s) and history of present illness.  I confirmed and edited as necessary the review of systems, past medical/surgical history, family history, social history, and examination findings as documented by others; and I examined the patient myself.  I personally reviewed the relevant tests, images, and reports as documented above.  I formulated and edited as necessary the assessment and plan and discussed the findings and management plan with the patient and family     Claudio Armijo MD

## 2024-03-27 ENCOUNTER — OFFICE VISIT (OUTPATIENT)
Dept: OPHTHALMOLOGY | Facility: CLINIC | Age: 64
End: 2024-03-27
Attending: OPHTHALMOLOGY
Payer: COMMERCIAL

## 2024-03-27 DIAGNOSIS — H53.10 SUBJECTIVE VISUAL DISTURBANCE: ICD-10-CM

## 2024-03-27 DIAGNOSIS — H53.2 DOUBLE VISION: Primary | ICD-10-CM

## 2024-03-27 DIAGNOSIS — H50.51 ESOPHORIA: ICD-10-CM

## 2024-03-27 PROCEDURE — 99214 OFFICE O/P EST MOD 30 MIN: CPT | Performed by: OPHTHALMOLOGY

## 2024-03-27 PROCEDURE — 92060 SENSORIMOTOR EXAMINATION: CPT

## 2024-03-27 PROCEDURE — 92012 INTRM OPH EXAM EST PATIENT: CPT | Performed by: OPHTHALMOLOGY

## 2024-03-27 ASSESSMENT — REFRACTION_WEARINGRX
OD_ADD: +2.50
OS_ADD: +2.50
OS_SPHERE: -0.50
OD_CYLINDER: SPHERE
OD_SPHERE: -0.25
SPECS_TYPE: PAL
OS_AXIS: 175
OS_CYLINDER: +0.50

## 2024-03-27 ASSESSMENT — CONF VISUAL FIELD
OD_NORMAL: 1
OD_INFERIOR_TEMPORAL_RESTRICTION: 0
OS_INFERIOR_NASAL_RESTRICTION: 0
METHOD: COUNTING FINGERS
OD_SUPERIOR_NASAL_RESTRICTION: 0
OD_SUPERIOR_TEMPORAL_RESTRICTION: 0
OS_NORMAL: 1
OD_INFERIOR_NASAL_RESTRICTION: 0
OS_SUPERIOR_TEMPORAL_RESTRICTION: 0
OS_SUPERIOR_NASAL_RESTRICTION: 0
OS_INFERIOR_TEMPORAL_RESTRICTION: 0

## 2024-03-27 ASSESSMENT — VISUAL ACUITY
METHOD: SNELLEN - LINEAR
OS_CC: 20/20
CORRECTION_TYPE: GLASSES
OD_CC: 20/20

## 2024-03-27 ASSESSMENT — TONOMETRY
OS_IOP_MMHG: 13
IOP_METHOD: ICARE
OD_IOP_MMHG: 15

## 2024-03-27 ASSESSMENT — EXTERNAL EXAM - RIGHT EYE: OD_EXAM: NORMAL

## 2024-03-27 ASSESSMENT — SLIT LAMP EXAM - LIDS
COMMENTS: NORMAL
COMMENTS: NORMAL

## 2024-03-27 ASSESSMENT — EXTERNAL EXAM - LEFT EYE: OS_EXAM: NORMAL

## 2024-03-27 ASSESSMENT — CUP TO DISC RATIO
OS_RATIO: 0.2
OD_RATIO: 0.2

## 2024-03-27 NOTE — NURSING NOTE
"Chief Complaint(s) and History of Present Illness(es)       Post Op (Ophthalmology) Both Eyes    In both eyes.  Since onset it is stable.  Associated symptoms include Negative for double vision. Additional comments: 3-4 month post-op for divergence insuff pattern ET (12/04/23).  Alek reports no double vision since his last visit.  No eye pain or irritation. States things have been \"perfect.\"  MICHAEL Butler 3/27/2024 9:26 AM                     "

## 2024-04-19 DIAGNOSIS — E78.5 HYPERLIPIDEMIA LDL GOAL <100: ICD-10-CM

## 2024-04-19 RX ORDER — SIMVASTATIN 40 MG
40 TABLET ORAL AT BEDTIME
Qty: 90 TABLET | Refills: 3 | Status: SHIPPED | OUTPATIENT
Start: 2024-04-19

## 2024-08-18 ENCOUNTER — HEALTH MAINTENANCE LETTER (OUTPATIENT)
Age: 64
End: 2024-08-18

## 2024-11-12 ENCOUNTER — OFFICE VISIT (OUTPATIENT)
Dept: OPHTHALMOLOGY | Facility: CLINIC | Age: 64
End: 2024-11-12
Attending: OPHTHALMOLOGY
Payer: COMMERCIAL

## 2024-11-12 DIAGNOSIS — H26.493 PCO (POSTERIOR CAPSULAR OPACIFICATION), BILATERAL: ICD-10-CM

## 2024-11-12 DIAGNOSIS — Z96.1 PSEUDOPHAKIA, BOTH EYES: Primary | ICD-10-CM

## 2024-11-12 PROCEDURE — 92015 DETERMINE REFRACTIVE STATE: CPT

## 2024-11-12 PROCEDURE — 99213 OFFICE O/P EST LOW 20 MIN: CPT | Performed by: OPHTHALMOLOGY

## 2024-11-12 ASSESSMENT — CONF VISUAL FIELD
OS_SUPERIOR_NASAL_RESTRICTION: 0
OS_INFERIOR_TEMPORAL_RESTRICTION: 0
METHOD: COUNTING FINGERS
OD_NORMAL: 1
OS_SUPERIOR_TEMPORAL_RESTRICTION: 0
OS_INFERIOR_NASAL_RESTRICTION: 0
OS_NORMAL: 1
OD_INFERIOR_NASAL_RESTRICTION: 0
OD_SUPERIOR_NASAL_RESTRICTION: 0
OD_SUPERIOR_TEMPORAL_RESTRICTION: 0
OD_INFERIOR_TEMPORAL_RESTRICTION: 0

## 2024-11-12 ASSESSMENT — EXTERNAL EXAM - RIGHT EYE: OD_EXAM: NORMAL

## 2024-11-12 ASSESSMENT — REFRACTION_WEARINGRX
OS_ADD: +2.50
OD_ADD: +2.50
OD_CYLINDER: SPHERE
OS_CYLINDER: +0.50
OD_SPHERE: -0.25
SPECS_TYPE: PAL
OS_AXIS: 175
OS_SPHERE: -0.50

## 2024-11-12 ASSESSMENT — REFRACTION_MANIFEST
OD_ADD: +2.50
OS_AXIS: 175
OD_SPHERE: -0.75
OS_CYLINDER: +0.50
OS_SPHERE: -0.50
OD_CYLINDER: +0.75
OD_AXIS: 032
OS_ADD: +2.50

## 2024-11-12 ASSESSMENT — CUP TO DISC RATIO
OS_RATIO: 0.2
OD_RATIO: 0.2

## 2024-11-12 ASSESSMENT — VISUAL ACUITY
METHOD: SNELLEN - LINEAR
OS_CC: 20/20
OD_PH_CC: 20/25
OD_CC: 20/30
OD_CC+: -2

## 2024-11-12 ASSESSMENT — EXTERNAL EXAM - LEFT EYE: OS_EXAM: NORMAL

## 2024-11-12 ASSESSMENT — SLIT LAMP EXAM - LIDS
COMMENTS: NORMAL
COMMENTS: NORMAL

## 2024-11-12 ASSESSMENT — TONOMETRY
OS_IOP_MMHG: 9
OD_IOP_MMHG: 13
IOP_METHOD: TONOPEN

## 2024-11-12 NOTE — NURSING NOTE
Chief Complaints and History of Present Illnesses   Patient presents with    Annual Eye Exam     Chief Complaint(s) and History of Present Illness(es)       Annual Eye Exam              Laterality: both eyes    Onset: gradual    Associated symptoms: glare.  Negative for dryness, eye pain, tearing, flashes, floaters and itching    Pain scale: 0/10              Comments    Alek is here for his annual eye exam. History of pseudophakia both eyes. Today he states vision is not as good as six months ago. He sees glare around headlights. LE>RE.    Austen Wright COT 9:11 AM November 12, 2024

## 2024-11-12 NOTE — PROGRESS NOTES
HPI       Annual Eye Exam    In both eyes.  Onset was gradual.  Associated symptoms include glare.  Negative for dryness, eye pain, tearing, flashes, floaters and itching.  Pain was noted as 0/10.             Comments    Alek is here for his annual eye exam. History of pseudophakia both eyes. Today he states vision is not as good as six months ago. He sees glare around headlights. RE>LE.    Austen Wright COT 9:11 AM November 12, 2024               Last edited by Austen Wright on 11/12/2024  9:14 AM.          Review of systems for the eyes was negative other than the pertinent positives/negatives listed in the HPI.      Assessment & Plan      Alek Gimenez is a 64 year old male with the following diagnoses:   1. Pseudophakia, both eyes    2. PCO (posterior capsular opacification), bilateral       Here for increased glare and blur  S/P cataract extraction both eyes Fall 2023.  Did not update glasses after surgery   Posterior capsular opacity (PCO) right eye > left eye   Improves with refraction today in both eyes   Refractive options reviewed  Refraction given  Consider YAG right eye if symptoms not resolved with refractive update         Patient disposition:   Return in about 1 year (around 11/12/2025) for DFE, Refraction (BAT).           Attending Physician Attestation:  Complete documentation of historical and exam elements from today's encounter can be found in the full encounter summary report (not reduplicated in this progress note).  I personally obtained the chief complaint(s) and history of present illness.  I confirmed and edited as necessary the review of systems, past medical/surgical history, family history, social history, and examination findings as documented by others; and I examined the patient myself.  I personally reviewed the relevant tests, images, and reports as documented above.  I formulated and edited as necessary the assessment and plan and discussed the findings and management plan with  the patient and family. . - Willi Munoz MD

## 2024-12-22 SDOH — HEALTH STABILITY: PHYSICAL HEALTH: ON AVERAGE, HOW MANY MINUTES DO YOU ENGAGE IN EXERCISE AT THIS LEVEL?: 20 MIN

## 2024-12-22 SDOH — HEALTH STABILITY: PHYSICAL HEALTH: ON AVERAGE, HOW MANY DAYS PER WEEK DO YOU ENGAGE IN MODERATE TO STRENUOUS EXERCISE (LIKE A BRISK WALK)?: 5 DAYS

## 2024-12-22 ASSESSMENT — ASTHMA QUESTIONNAIRES
QUESTION_5 LAST FOUR WEEKS HOW WOULD YOU RATE YOUR ASTHMA CONTROL: COMPLETELY CONTROLLED
QUESTION_4 LAST FOUR WEEKS HOW OFTEN HAVE YOU USED YOUR RESCUE INHALER OR NEBULIZER MEDICATION (SUCH AS ALBUTEROL): ONCE A WEEK OR LESS
QUESTION_2 LAST FOUR WEEKS HOW OFTEN HAVE YOU HAD SHORTNESS OF BREATH: NOT AT ALL
QUESTION_3 LAST FOUR WEEKS HOW OFTEN DID YOUR ASTHMA SYMPTOMS (WHEEZING, COUGHING, SHORTNESS OF BREATH, CHEST TIGHTNESS OR PAIN) WAKE YOU UP AT NIGHT OR EARLIER THAN USUAL IN THE MORNING: NOT AT ALL
ACT_TOTALSCORE: 24
ACT_TOTALSCORE: 24
QUESTION_1 LAST FOUR WEEKS HOW MUCH OF THE TIME DID YOUR ASTHMA KEEP YOU FROM GETTING AS MUCH DONE AT WORK, SCHOOL OR AT HOME: NONE OF THE TIME

## 2024-12-22 ASSESSMENT — SOCIAL DETERMINANTS OF HEALTH (SDOH): HOW OFTEN DO YOU GET TOGETHER WITH FRIENDS OR RELATIVES?: ONCE A WEEK

## 2024-12-24 ENCOUNTER — OFFICE VISIT (OUTPATIENT)
Dept: FAMILY MEDICINE | Facility: CLINIC | Age: 64
End: 2024-12-24
Payer: COMMERCIAL

## 2024-12-24 VITALS
RESPIRATION RATE: 16 BRPM | TEMPERATURE: 97.8 F | WEIGHT: 207.9 LBS | HEART RATE: 73 BPM | BODY MASS INDEX: 29.1 KG/M2 | SYSTOLIC BLOOD PRESSURE: 121 MMHG | DIASTOLIC BLOOD PRESSURE: 81 MMHG | OXYGEN SATURATION: 95 % | HEIGHT: 71 IN

## 2024-12-24 DIAGNOSIS — R35.1 BENIGN PROSTATIC HYPERPLASIA WITH NOCTURIA: ICD-10-CM

## 2024-12-24 DIAGNOSIS — E78.5 HYPERLIPIDEMIA LDL GOAL <100: ICD-10-CM

## 2024-12-24 DIAGNOSIS — Z23 NEED FOR PROPHYLACTIC VACCINATION AND INOCULATION AGAINST INFLUENZA: ICD-10-CM

## 2024-12-24 DIAGNOSIS — J45.20 MILD INTERMITTENT REACTIVE AIRWAY DISEASE WITHOUT COMPLICATION: ICD-10-CM

## 2024-12-24 DIAGNOSIS — N40.1 BENIGN PROSTATIC HYPERPLASIA WITH NOCTURIA: ICD-10-CM

## 2024-12-24 DIAGNOSIS — Z12.5 SCREENING FOR PROSTATE CANCER: ICD-10-CM

## 2024-12-24 DIAGNOSIS — Z00.00 ROUTINE GENERAL MEDICAL EXAMINATION AT A HEALTH CARE FACILITY: Primary | ICD-10-CM

## 2024-12-24 DIAGNOSIS — N52.9 ERECTILE DYSFUNCTION, UNSPECIFIED ERECTILE DYSFUNCTION TYPE: ICD-10-CM

## 2024-12-24 LAB
ALBUMIN SERPL BCG-MCNC: 4.3 G/DL (ref 3.5–5.2)
ALP SERPL-CCNC: 107 U/L (ref 40–150)
ALT SERPL W P-5'-P-CCNC: 31 U/L (ref 0–70)
ANION GAP SERPL CALCULATED.3IONS-SCNC: 12 MMOL/L (ref 7–15)
AST SERPL W P-5'-P-CCNC: 26 U/L (ref 0–45)
BILIRUB SERPL-MCNC: 0.5 MG/DL
BUN SERPL-MCNC: 19.9 MG/DL (ref 8–23)
CALCIUM SERPL-MCNC: 9.3 MG/DL (ref 8.8–10.4)
CHLORIDE SERPL-SCNC: 103 MMOL/L (ref 98–107)
CHOLEST SERPL-MCNC: 214 MG/DL
CREAT SERPL-MCNC: 0.99 MG/DL (ref 0.67–1.17)
EGFRCR SERPLBLD CKD-EPI 2021: 85 ML/MIN/1.73M2
ERYTHROCYTE [DISTWIDTH] IN BLOOD BY AUTOMATED COUNT: 12.4 % (ref 10–15)
FASTING STATUS PATIENT QL REPORTED: NO
FASTING STATUS PATIENT QL REPORTED: NO
GLUCOSE SERPL-MCNC: 88 MG/DL (ref 70–99)
HCO3 SERPL-SCNC: 26 MMOL/L (ref 22–29)
HCT VFR BLD AUTO: 45.2 % (ref 40–53)
HDLC SERPL-MCNC: 48 MG/DL
HGB BLD-MCNC: 14.9 G/DL (ref 13.3–17.7)
LDLC SERPL CALC-MCNC: 125 MG/DL
MCH RBC QN AUTO: 29.3 PG (ref 26.5–33)
MCHC RBC AUTO-ENTMCNC: 33 G/DL (ref 31.5–36.5)
MCV RBC AUTO: 89 FL (ref 78–100)
NONHDLC SERPL-MCNC: 166 MG/DL
PLATELET # BLD AUTO: 203 10E3/UL (ref 150–450)
POTASSIUM SERPL-SCNC: 4.3 MMOL/L (ref 3.4–5.3)
PROT SERPL-MCNC: 7.2 G/DL (ref 6.4–8.3)
PSA SERPL DL<=0.01 NG/ML-MCNC: 1.53 NG/ML (ref 0–4.5)
RBC # BLD AUTO: 5.09 10E6/UL (ref 4.4–5.9)
SODIUM SERPL-SCNC: 141 MMOL/L (ref 135–145)
TRIGL SERPL-MCNC: 203 MG/DL
WBC # BLD AUTO: 8 10E3/UL (ref 4–11)

## 2024-12-24 PROCEDURE — 91320 SARSCV2 VAC 30MCG TRS-SUC IM: CPT | Performed by: INTERNAL MEDICINE

## 2024-12-24 PROCEDURE — 99396 PREV VISIT EST AGE 40-64: CPT | Mod: 25 | Performed by: INTERNAL MEDICINE

## 2024-12-24 PROCEDURE — 85027 COMPLETE CBC AUTOMATED: CPT | Performed by: INTERNAL MEDICINE

## 2024-12-24 PROCEDURE — G0103 PSA SCREENING: HCPCS | Performed by: INTERNAL MEDICINE

## 2024-12-24 PROCEDURE — 80053 COMPREHEN METABOLIC PANEL: CPT | Performed by: INTERNAL MEDICINE

## 2024-12-24 PROCEDURE — 90673 RIV3 VACCINE NO PRESERV IM: CPT | Performed by: INTERNAL MEDICINE

## 2024-12-24 PROCEDURE — 90471 IMMUNIZATION ADMIN: CPT | Performed by: INTERNAL MEDICINE

## 2024-12-24 PROCEDURE — 80061 LIPID PANEL: CPT | Performed by: INTERNAL MEDICINE

## 2024-12-24 PROCEDURE — 36415 COLL VENOUS BLD VENIPUNCTURE: CPT | Performed by: INTERNAL MEDICINE

## 2024-12-24 PROCEDURE — 99214 OFFICE O/P EST MOD 30 MIN: CPT | Mod: 25 | Performed by: INTERNAL MEDICINE

## 2024-12-24 PROCEDURE — 90480 ADMN SARSCOV2 VAC 1/ONLY CMP: CPT | Performed by: INTERNAL MEDICINE

## 2024-12-24 RX ORDER — BUDESONIDE AND FORMOTEROL FUMARATE DIHYDRATE 80; 4.5 UG/1; UG/1
AEROSOL RESPIRATORY (INHALATION)
Qty: 10.2 G | Refills: 1 | Status: SHIPPED | OUTPATIENT
Start: 2024-12-24

## 2024-12-24 RX ORDER — ALBUTEROL SULFATE 90 UG/1
2 INHALANT RESPIRATORY (INHALATION) EVERY 4 HOURS PRN
Qty: 18 G | Refills: 11 | Status: SHIPPED | OUTPATIENT
Start: 2024-12-24

## 2024-12-24 RX ORDER — SIMVASTATIN 40 MG
40 TABLET ORAL AT BEDTIME
Qty: 90 TABLET | Refills: 3 | Status: SHIPPED | OUTPATIENT
Start: 2024-12-24 | End: 2024-12-25

## 2024-12-24 RX ORDER — SILDENAFIL 100 MG/1
TABLET, FILM COATED ORAL
Qty: 30 TABLET | Refills: 11 | Status: SHIPPED | OUTPATIENT
Start: 2024-12-24

## 2024-12-24 RX ORDER — TAMSULOSIN HYDROCHLORIDE 0.4 MG/1
0.4 CAPSULE ORAL DAILY
Qty: 90 CAPSULE | Refills: 3 | Status: SHIPPED | OUTPATIENT
Start: 2024-12-24

## 2024-12-24 ASSESSMENT — PAIN SCALES - GENERAL: PAINLEVEL_OUTOF10: NO PAIN (0)

## 2024-12-24 NOTE — PATIENT INSTRUCTIONS
Patient Education   Preventive Care Advice   This is general advice given by our system to help you stay healthy. However, your care team may have specific advice just for you. Please talk to your care team about your preventive care needs.  Nutrition  Eat 5 or more servings of fruits and vegetables each day.  Try wheat bread, brown rice and whole grain pasta (instead of white bread, rice, and pasta).  Get enough calcium and vitamin D. Check the label on foods and aim for 100% of the RDA (recommended daily allowance).  Lifestyle  Exercise at least 150 minutes each week  (30 minutes a day, 5 days a week).  Do muscle strengthening activities 2 days a week. These help control your weight and prevent disease.  No smoking.  Wear sunscreen to prevent skin cancer.  Have a dental exam and cleaning every 6 months.  Yearly exams  See your health care team every year to talk about:  Any changes in your health.  Any medicines your care team has prescribed.  Preventive care, family planning, and ways to prevent chronic diseases.  Shots (vaccines)   HPV shots (up to age 26), if you've never had them before.  Hepatitis B shots (up to age 59), if you've never had them before.  COVID-19 shot: Get this shot when it's due.  Flu shot: Get a flu shot every year.  Tetanus shot: Get a tetanus shot every 10 years.  Pneumococcal, hepatitis A, and RSV shots: Ask your care team if you need these based on your risk.  Shingles shot (for age 50 and up)  General health tests  Diabetes screening:  Starting at age 35, Get screened for diabetes at least every 3 years.  If you are younger than age 35, ask your care team if you should be screened for diabetes.  Cholesterol test: At age 39, start having a cholesterol test every 5 years, or more often if advised.  Bone density scan (DEXA): At age 50, ask your care team if you should have this scan for osteoporosis (brittle bones).  Hepatitis C: Get tested at least once in your life.  STIs (sexually  transmitted infections)  Before age 24: Ask your care team if you should be screened for STIs.  After age 24: Get screened for STIs if you're at risk. You are at risk for STIs (including HIV) if:  You are sexually active with more than one person.  You don't use condoms every time.  You or a partner was diagnosed with a sexually transmitted infection.  If you are at risk for HIV, ask about PrEP medicine to prevent HIV.  Get tested for HIV at least once in your life, whether you are at risk for HIV or not.  Cancer screening tests  Cervical cancer screening: If you have a cervix, begin getting regular cervical cancer screening tests starting at age 21.  Breast cancer scan (mammogram): If you've ever had breasts, begin having regular mammograms starting at age 40. This is a scan to check for breast cancer.  Colon cancer screening: It is important to start screening for colon cancer at age 45.  Have a colonoscopy test every 10 years (or more often if you're at risk) Or, ask your provider about stool tests like a FIT test every year or Cologuard test every 3 years.  To learn more about your testing options, visit:   .  For help making a decision, visit:   https://bit.ly/yf99884.  Prostate cancer screening test: If you have a prostate, ask your care team if a prostate cancer screening test (PSA) at age 55 is right for you.  Lung cancer screening: If you are a current or former smoker ages 50 to 80, ask your care team if ongoing lung cancer screenings are right for you.  For informational purposes only. Not to replace the advice of your health care provider. Copyright   2023 [employer]. All rights reserved. Clinically reviewed by the [facility]. Predect [id number] - REV 01/24.

## 2024-12-24 NOTE — PROGRESS NOTES
Preventive Care Visit  [facility]  Dirk Hayes MD, Internal Medicine  Dec 24, 2024      Assessment & Plan     Routine general medical examination at a health care facility      Benign prostatic hyperplasia with nocturia  Discussed options for treatment of lower urinary tract symptoms from benign prostatic hyperplasia, decided on Flomax, side effects and risks were discussed  - tamsulosin (FLOMAX) 0.4 MG capsule; Take 1 capsule (0.4 mg) by mouth daily.    Mild intermittent reactive airway disease without complication  Stable, continue current medications  - albuterol (PROAIR HFA/PROVENTIL HFA/VENTOLIN HFA) 108 (90 Base) MCG/ACT inhaler; Inhale 2 puffs into the lungs every 4 hours as needed for shortness of breath or wheezing.  - budesonide-formoterol (SYMBICORT) 80-4.5 MCG/ACT Inhaler; INHALE 2 PUFFS INTO THE LUNGS TWICE DAILY    Hyperlipidemia LDL goal <100  On statin therapy, rechecking lipids  - Lipid panel reflex to direct LDL Non-fasting; Future  - simvastatin (ZOCOR) 40 MG tablet; Take 1 tablet (40 mg) by mouth at bedtime.  - Comprehensive metabolic panel; Future  - CBC with platelets; Future  - Lipid panel reflex to direct LDL Fasting; Future  - Comprehensive metabolic panel  - CBC with platelets  - Lipid panel reflex to direct LDL Fasting    Erectile dysfunction, unspecified erectile dysfunction type  Continue as needed Viagra  - sildenafil (VIAGRA) 100 MG tablet; 0.5-1 tablets (50mg-100 mg) by mouth daily prn for erectile dysfunction prior to intercourse. Avoid nitroglycerin, terazosin or doxazosin.    Screening for prostate cancer    - PROSTATE SPEC ANTIGEN SCREEN; Future  - PROSTATE SPEC ANTIGEN SCREEN    Need for prophylactic vaccination and inoculation against influenza  Flu shot and COVID shot today, recommended RSV shot at pharmacy    Patient has been advised of split billing requirements and indicates understanding: Yes        BMI  Estimated body mass index is 29 kg/m  as  "calculated from the following:    Height as of this encounter: 1.803 m (5' 11\").    Weight as of this encounter: 94.3 kg (207 lb 14.4 oz).   Weight management plan: Discussed healthy diet and exercise guidelines    Counseling  Appropriate preventive services were addressed with this patient via screening, questionnaire, or discussion as appropriate for fall prevention, nutrition, physical activity, Tobacco-use cessation, social engagement, weight loss and cognition.  Checklist reviewing preventive services available has been given to the patient.  Reviewed patient's diet, addressing concerns and/or questions.       FUTURE APPOINTMENTS:       - Follow-up for annual visit or as needed    Subjective   Alek is a 64 year old, presenting for the following:  Recheck Medication (Urinary problems) and Physical        12/24/2024    11:27 AM   Additional Questions   Roomed by Stephanie   Accompanied by none          HPI    Wt Readings from Last 4 Encounters:   12/24/24 94.3 kg (207 lb 14.4 oz)   12/04/23 95.3 kg (210 lb)   10/12/23 95.3 kg (210 lb)   09/28/23 94.3 kg (208 lb)             Urinary frequency urgency, nocturia with weaker stream over the last several months    Symptoms severe enough that he thinks he would like to start a medication for this      Health Care Directive  Patient does not have a Health Care Directive: Discussed advance care planning with patient; information given to patient to review.      12/22/2024   General Health   How would you rate your overall physical health? Excellent   Feel stress (tense, anxious, or unable to sleep) Not at all         12/22/2024   Nutrition   Three or more servings of calcium each day? Yes   Diet: Regular (no restrictions)   How many servings of fruit and vegetables per day? (!) 2-3   How many sweetened beverages each day? 0-1         12/22/2024   Exercise   Days per week of moderate/strenous exercise 5 days   Average minutes spent exercising at this level 20 min         " 12/22/2024   Social Factors   Frequency of gathering with friends or relatives Once a week   Worry food won't last until get money to buy more No   Food not last or not have enough money for food? No   Do you have housing? (Housing is defined as stable permanent housing and does not include staying ouside in a car, in a tent, in an abandoned building, in an overnight shelter, or couch-surfing.) Yes   Are you worried about losing your housing? No   Lack of transportation? No   Unable to get utilities (heat,electricity)? No         12/22/2024   Fall Risk   Fallen 2 or more times in the past year? No   Trouble with walking or balance? No          12/22/2024   Dental   Dentist two times every year? Yes         12/22/2024   TB Screening   Were you born outside of the [place]? No               12/22/2024   Substance Use   Alcohol more than 3/day or more than 7/wk No   Do you use any other substances recreationally? No     Social History     Tobacco Use    Smoking status: Never    Smokeless tobacco: Never   Substance Use Topics    Alcohol use: Yes     Comment: 1 drink per month    Drug use: No           12/22/2024   STI Screening   New sexual partner(s) since last STI/HIV test? No   Last PSA:   PSA   Date Value Ref Range Status   05/19/2021 1.83 0 - 4 ug/L Final     Comment:     Assay Method:  Chemiluminescence using Siemens Vista analyzer     Prostate Specific Antigen Screen   Date Value Ref Range Status   03/30/2023 1.19 0.00 - 4.50 ng/mL Final   10/22/2021 1.40 0.00 - 4.00 ug/L Final     ASCVD Risk   The 10-year ASCVD risk score (Ivis LUBIN, et al., 2019) is: 12%    Values used to calculate the score:      Age: 64 years      Sex: Male      Is Non- : No      Diabetic: No      Tobacco smoker: No      Systolic Blood Pressure: 121 mmHg      Is BP treated: No      HDL Cholesterol: 41 mg/dL      Total Cholesterol: 197 mg/dL           Reviewed and updated as needed this visit by Provider                     Past Medical History:   Diagnosis Date    Hyperlipidemia, unspecified hyperlipidemia type 12/16/2019    Mild intermittent reactive airway disease without complication 3/30/2023    Upper GI bleeding 12/29/2019     Past Surgical History:   Procedure Laterality Date    CATARACT IOL, RT/LT      ESOPHAGOSCOPY, GASTROSCOPY, DUODENOSCOPY (EGD), COMBINED N/A 12/29/2019    Procedure: Esophagogastroduodenoscopy, With Biopsy;  Surgeon: Дмитрий Peña MD;  Location:  GI    PHACOEMULSIFICATION CLEAR CORNEA WITH STANDARD INTRAOCULAR LENS IMPLANT Left 09/28/2023    Procedure: LEFT EYE PHACOEMULSIFICATION, CATARACT, WITH INTRAOCULAR LENS IMPLANT;  Surgeon: Jen Buck MD;  Location: [facility] OR    PHACOEMULSIFICATION CLEAR CORNEA WITH STANDARD INTRAOCULAR LENS IMPLANT Right 10/12/2023    Procedure: RIGHT EYE PHACOEMULSIFICATION, CATARACT, WITH INTRAOCULAR LENS IMPLANT;  Surgeon: Jen Buck MD;  Location: [facility] OR    RECESSION RESECTION (REPAIR STRABISMUS) BILATERAL Bilateral 12/4/2023    Procedure: Bilateral eye muscle correction with use of adjustable suture on left eye;  Surgeon: Claudio Armijo MD;  Location: [facility] OR     BP Readings from Last 3 Encounters:   12/24/24 121/81   12/04/23 111/69   10/12/23 112/71    Wt Readings from Last 3 Encounters:   12/24/24 94.3 kg (207 lb 14.4 oz)   12/04/23 95.3 kg (210 lb)   10/12/23 95.3 kg (210 lb)                  Patient Active Problem List   Diagnosis    Hyperlipidemia LDL goal <100    Lumbar radiculopathy    Mild intermittent reactive airway disease without complication    Nuclear sclerotic cataract of both eyes    Double vision     Past Surgical History:   Procedure Laterality Date    CATARACT IOL, RT/LT      ESOPHAGOSCOPY, GASTROSCOPY, DUODENOSCOPY (EGD), COMBINED N/A 12/29/2019    Procedure: Esophagogastroduodenoscopy, With Biopsy;  Surgeon: Дмитрий Peña MD;  Location:  GI    PHACOEMULSIFICATION CLEAR CORNEA WITH STANDARD INTRAOCULAR  LENS IMPLANT Left 09/28/2023    Procedure: LEFT EYE PHACOEMULSIFICATION, CATARACT, WITH INTRAOCULAR LENS IMPLANT;  Surgeon: Jen Buck MD;  [place] [facility] [place]    PHACOEMULSIFICATION CLEAR CORNEA WITH STANDARD INTRAOCULAR LENS IMPLANT Right 10/12/2023    Procedure: RIGHT EYE PHACOEMULSIFICATION, CATARACT, WITH INTRAOCULAR LENS IMPLANT;  Surgeon: Jen Buck MD;  [place] [facility] [place]    RECESSION RESECTION (REPAIR STRABISMUS) BILATERAL Bilateral 12/4/2023    Procedure: Bilateral eye muscle correction with use of adjustable suture on left eye;  Surgeon: Claudio Armijo MD;  [place] [facility] [place]       Social History     Tobacco Use    Smoking status: Never    Smokeless tobacco: Never   Substance Use Topics    Alcohol use: Yes     Comment: 1 drink per month     Family History   Problem Relation Age of Onset    Glaucoma No family hx of     Macular Degeneration No family hx of          Current Outpatient Medications   Medication Sig Dispense Refill    albuterol (PROAIR HFA/PROVENTIL HFA/VENTOLIN HFA) 108 (90 Base) MCG/ACT inhaler Inhale 2 puffs into the lungs every 4 hours as needed for shortness of breath or wheezing. 18 g 11    budesonide-formoterol (SYMBICORT) 80-4.5 MCG/ACT Inhaler INHALE 2 PUFFS INTO THE LUNGS TWICE DAILY 10.2 g 1    sildenafil (VIAGRA) 100 MG tablet 0.5-1 tablets (50mg-100 mg) by mouth daily prn for erectile dysfunction prior to intercourse. Avoid nitroglycerin, terazosin or doxazosin. 30 tablet 11    simvastatin (ZOCOR) 40 MG tablet Take 1 tablet (40 mg) by mouth at bedtime. 90 tablet 3    tamsulosin (FLOMAX) 0.4 MG capsule Take 1 capsule (0.4 mg) by mouth daily. 90 capsule 3     No Known Allergies    A 10 organ systems ROS is negative other than any pertinent positives or negatives previously stated.      Objective    Exam  /81 (BP Location: Right arm, Patient Position: Sitting, Cuff Size: Adult Large)   Pulse 73   Temp 97.8  F (36.6  C) (Tympanic)   Resp 16   Ht 1.803 m (5'  "11\")   Wt 94.3 kg (207 lb 14.4 oz)   SpO2 95%   BMI 29.00 kg/m     Estimated body mass index is 29 kg/m  as calculated from the following:    Height as of this encounter: 1.803 m (5' 11\").    Weight as of this encounter: 94.3 kg (207 lb 14.4 oz).    Physical Exam  GENERAL: alert and no distress  EYES: Eyes grossly normal to inspection, PERRL and conjunctivae and sclerae normal  HENT: ear canals and TM's normal, nose and mouth without ulcers or lesions  NECK: no adenopathy, no asymmetry, masses, or scars  RESP: lungs clear to auscultation - no rales, rhonchi or wheezes  CV: regular rate and rhythm, normal S1 S2, no S3 or S4, no murmur, click or rub, no peripheral edema  ABDOMEN: soft, nontender, no hepatosplenomegaly, no masses and bowel sounds normal  RECTAL: normal sphincter tone, no rectal masses, prostate normal size, smooth, nontender without nodules or masses  MS: no gross musculoskeletal defects noted, no edema  SKIN: no suspicious lesions or rashes  NEURO: Normal strength and tone, mentation intact and speech normal  PSYCH: mentation appears normal, affect normal/bright        Signed Electronically by: Dirk Hayes MD    "

## 2024-12-25 RX ORDER — ROSUVASTATIN CALCIUM 20 MG/1
20 TABLET, COATED ORAL DAILY
Qty: 90 TABLET | Refills: 3 | Status: SHIPPED | OUTPATIENT
Start: 2024-12-25

## 2024-12-25 NOTE — RESULT ENCOUNTER NOTE
"The following letter pertains to your most recent diagnostic tests:    -The cholesterol has increased since last check.  I think we should upgrade your current statin therapy simvastatin to a newer statin that would be more potent at lower \"bad cholesterol\" LDL to get us closer to your goal \"bad cholesterol\" LDL of that less than 100.   I sent a new prescription for Crestor (rosuvastatin) 20 mg tablets to your pharmacy.  I recommend that you return to the lab after you have been taking the new medication for at least 4 weeks.   You should schedule a lab appointment for that purpose.      -Liver and gallbladder tests are normal for you. (ALT,AST, Alk phos, bilirubin), kidney function is normal for you (Creatinine, GFR), Sodium is normal, Potassium is normal for you, Calcium is normal for you, Glucose (blood sugar) is normal for you.      -Your prostate specific antigen (PSA) test result returned normal.     -Your complete blood counts including your hemoglobin returned normal for you.           Bottom line:  Change the statin medication you are taking from simvastatin to Crestor (rosuvastatin) and return to the lab after you have been taking Crestor (rosuvastatin) for at least 4 weeks.           Sincerely,    Dr. Hayes"

## 2025-02-25 ENCOUNTER — LAB (OUTPATIENT)
Dept: LAB | Facility: CLINIC | Age: 65
End: 2025-02-25
Payer: COMMERCIAL

## 2025-02-25 DIAGNOSIS — E78.5 HYPERLIPIDEMIA LDL GOAL <100: ICD-10-CM

## 2025-02-25 PROCEDURE — 80061 LIPID PANEL: CPT

## 2025-02-25 PROCEDURE — 36415 COLL VENOUS BLD VENIPUNCTURE: CPT

## 2025-02-26 LAB
CHOLEST SERPL-MCNC: 164 MG/DL
FASTING STATUS PATIENT QL REPORTED: YES
HDLC SERPL-MCNC: 46 MG/DL
LDLC SERPL CALC-MCNC: 87 MG/DL
NONHDLC SERPL-MCNC: 118 MG/DL
TRIGL SERPL-MCNC: 156 MG/DL

## 2025-02-26 NOTE — RESULT ENCOUNTER NOTE
The following letter pertains to your most recent diagnostic tests:    The cholesterol looks much better.  Keep taking the Crestor (rosuvastatin) as you are.        Sincerely,    Dr. Hayes

## 2025-03-29 DIAGNOSIS — J45.20 MILD INTERMITTENT REACTIVE AIRWAY DISEASE WITHOUT COMPLICATION: ICD-10-CM

## 2025-03-31 RX ORDER — BUDESONIDE AND FORMOTEROL FUMARATE DIHYDRATE 80; 4.5 UG/1; UG/1
AEROSOL RESPIRATORY (INHALATION)
Qty: 10.2 G | Refills: 1 | Status: SHIPPED | OUTPATIENT
Start: 2025-03-31

## (undated) DEVICE — LINEN TOWEL PACK X5 5464

## (undated) DEVICE — EYE CANN IRR 27GA ANTERIOR CHAMBER 581280

## (undated) DEVICE — EYE KNIFE STILETTO VISITEC 1.1MM ANG 45DEG SIDEPORT 376620

## (undated) DEVICE — EYE TIP IRRIGATION & ASPIRATION POLYMER CVD 0.3MM 8065751512

## (undated) DEVICE — EYE CANN IRR 25GA CYSTOTOME 581610

## (undated) DEVICE — GLOVE BIOGEL PI MICRO SZ 7.0 48570

## (undated) DEVICE — PACK CATARACT CUSTOM ASC SEY15CPUMC

## (undated) DEVICE — DRAPE STERI TOWEL LG 1010

## (undated) DEVICE — SOL WATER IRRIG 500ML BOTTLE 2F7113

## (undated) DEVICE — SU VICRYL 6-0 S-14DA 18" UND J670G

## (undated) DEVICE — EYE SHIELD PLASTIC

## (undated) DEVICE — DRSG STERI STRIP 1/2X4" R1547

## (undated) DEVICE — PACK MINOR EYE CUSTOM ASC

## (undated) DEVICE — EYE KNIFE SLIT XSTAR VISITEC 2.6MM 45DEG 373726

## (undated) DEVICE — EYE MARKING PAD 581057

## (undated) DEVICE — EYE PACK CUSTOM ANTERIOR 30DEG TIP CENTURION PPK6682-04

## (undated) DEVICE — GLOVE PROTEXIS MICRO 7.5  2D73PM75

## (undated) DEVICE — ESU CORD BIPOLAR GREEN 10-4000

## (undated) DEVICE — EYE PREP BETADINE 5% SOLUTION 30ML 0065-0411-30

## (undated) RX ORDER — ACETAMINOPHEN 325 MG/1
TABLET ORAL
Status: DISPENSED
Start: 2023-09-28

## (undated) RX ORDER — DEXAMETHASONE SODIUM PHOSPHATE 4 MG/ML
INJECTION, SOLUTION INTRA-ARTICULAR; INTRALESIONAL; INTRAMUSCULAR; INTRAVENOUS; SOFT TISSUE
Status: DISPENSED
Start: 2023-12-04

## (undated) RX ORDER — PROPOFOL 10 MG/ML
INJECTION, EMULSION INTRAVENOUS
Status: DISPENSED
Start: 2023-12-04

## (undated) RX ORDER — ONDANSETRON 2 MG/ML
INJECTION INTRAMUSCULAR; INTRAVENOUS
Status: DISPENSED
Start: 2023-12-04

## (undated) RX ORDER — FENTANYL CITRATE 50 UG/ML
INJECTION, SOLUTION INTRAMUSCULAR; INTRAVENOUS
Status: DISPENSED
Start: 2023-12-04

## (undated) RX ORDER — FENTANYL CITRATE 50 UG/ML
INJECTION, SOLUTION INTRAMUSCULAR; INTRAVENOUS
Status: DISPENSED
Start: 2019-12-29

## (undated) RX ORDER — FENTANYL CITRATE 50 UG/ML
INJECTION, SOLUTION INTRAMUSCULAR; INTRAVENOUS
Status: DISPENSED
Start: 2023-10-12

## (undated) RX ORDER — KETOROLAC TROMETHAMINE 30 MG/ML
INJECTION, SOLUTION INTRAMUSCULAR; INTRAVENOUS
Status: DISPENSED
Start: 2023-12-04

## (undated) RX ORDER — FENTANYL CITRATE 50 UG/ML
INJECTION, SOLUTION INTRAMUSCULAR; INTRAVENOUS
Status: DISPENSED
Start: 2023-09-28

## (undated) RX ORDER — GLYCOPYRROLATE 0.2 MG/ML
INJECTION INTRAMUSCULAR; INTRAVENOUS
Status: DISPENSED
Start: 2023-12-04

## (undated) RX ORDER — ACETAMINOPHEN 325 MG/1
TABLET ORAL
Status: DISPENSED
Start: 2023-12-04